# Patient Record
Sex: FEMALE | Race: WHITE | NOT HISPANIC OR LATINO | Employment: OTHER | URBAN - METROPOLITAN AREA
[De-identification: names, ages, dates, MRNs, and addresses within clinical notes are randomized per-mention and may not be internally consistent; named-entity substitution may affect disease eponyms.]

---

## 2017-02-12 ENCOUNTER — OFFICE VISIT (OUTPATIENT)
Dept: RETAIL CLINIC | Facility: CLINIC | Age: 82
End: 2017-02-12

## 2017-02-12 VITALS
HEART RATE: 68 BPM | DIASTOLIC BLOOD PRESSURE: 58 MMHG | OXYGEN SATURATION: 98 % | TEMPERATURE: 98 F | SYSTOLIC BLOOD PRESSURE: 110 MMHG | WEIGHT: 167 LBS

## 2017-02-12 DIAGNOSIS — J45.901 ASTHMATIC BRONCHITIS WITH ACUTE EXACERBATION: Primary | ICD-10-CM

## 2017-02-12 PROCEDURE — 99203 OFFICE O/P NEW LOW 30 MIN: CPT | Performed by: NURSE PRACTITIONER

## 2017-02-12 RX ORDER — LEVOTHYROXINE SODIUM 0.07 MG/1
75 TABLET ORAL DAILY
COMMUNITY

## 2017-02-12 RX ORDER — AZITHROMYCIN 250 MG/1
TABLET, FILM COATED ORAL
Qty: 6 TABLET | Refills: 0 | Status: SHIPPED | OUTPATIENT
Start: 2017-02-12 | End: 2018-07-26

## 2017-02-12 RX ORDER — OMEPRAZOLE 40 MG/1
40 CAPSULE, DELAYED RELEASE ORAL DAILY
COMMUNITY

## 2017-02-12 RX ORDER — PREDNISONE 1 MG/1
1 TABLET ORAL DAILY
COMMUNITY
End: 2018-07-26

## 2017-02-12 RX ORDER — CANDESARTAN CILEXETIL AND HYDROCHLOROTHIAZIDE 32; 12.5 MG/1; MG/1
1 TABLET ORAL DAILY
COMMUNITY
End: 2018-07-26

## 2017-02-12 RX ORDER — AMLODIPINE BESYLATE 5 MG/1
5 TABLET ORAL DAILY
COMMUNITY
End: 2018-07-26

## 2017-02-12 RX ORDER — ALBUTEROL SULFATE 90 UG/1
2 AEROSOL, METERED RESPIRATORY (INHALATION) EVERY 4 HOURS PRN
COMMUNITY
End: 2018-11-04

## 2017-02-12 NOTE — PROGRESS NOTES
Subjective   Anitra Dee is a 81 y.o. female.     History of Present Illness Patient of Dr Myers. One week ago she began to complain of dry cough and fatigue. Has a history of asthma.  Had some left over prednisone and took it for three days but stopped due to insomnia. Has some exp wheezes.  No fever. Denies myalgia. No headache, rhinorrhea or sore throat.  She did have one episode of diarrhea.  Has had pneumonia twice. Has not had flu or pneumo vax.     The following portions of the patient's history were reviewed and updated as appropriate: allergies, current medications, past family history, past medical history, past social history, past surgical history and problem list.    Review of Systems   Constitutional: Positive for fatigue. Negative for fever and unexpected weight change.   HENT: Negative for congestion, hearing loss, nosebleeds, rhinorrhea, sore throat, trouble swallowing and voice change.    Eyes: Negative for pain, discharge, redness and visual disturbance.   Respiratory: Positive for cough. Negative for chest tightness, shortness of breath and wheezing.    Cardiovascular: Negative for chest pain, palpitations and leg swelling.   Gastrointestinal: Negative for abdominal distention, abdominal pain, anal bleeding, blood in stool, constipation, diarrhea, nausea and vomiting.   Endocrine: Negative for cold intolerance, heat intolerance, polydipsia, polyphagia and polyuria.   Genitourinary: Negative for dysuria, flank pain, frequency and hematuria.   Musculoskeletal: Negative for arthralgias, gait problem, joint swelling and myalgias.   Skin: Negative for color change and rash.   Neurological: Negative for dizziness, tremors, seizures, syncope, speech difficulty, weakness, numbness and headaches.   Hematological: Negative.    Psychiatric/Behavioral: Negative.        Objective   Physical Exam   Constitutional: She is oriented to person, place, and time. She appears well-developed and well-nourished.  No distress.   HENT:   Head: Normocephalic and atraumatic.   Right Ear: Tympanic membrane and external ear normal.   Left Ear: Tympanic membrane and external ear normal.   Nose: Nose normal.   Mouth/Throat: Oropharynx is clear and moist. No oropharyngeal exudate.   Eyes: Conjunctivae are normal. Pupils are equal, round, and reactive to light. Right eye exhibits no discharge. Left eye exhibits no discharge. No scleral icterus.   Neck: Neck supple. No tracheal deviation present. No thyromegaly present.   Cardiovascular: Normal rate, regular rhythm and normal heart sounds.  Exam reveals no gallop and no friction rub.    No murmur heard.  Pulmonary/Chest: Effort normal. No respiratory distress. She has wheezes.   Exp wheezes bilat   Abdominal: Soft. Bowel sounds are normal. She exhibits no distension and no mass. There is no tenderness.   Musculoskeletal: She exhibits no edema or deformity.   Lymphadenopathy:     She has no cervical adenopathy.   Neurological: She is alert and oriented to person, place, and time. Coordination normal.   Skin: Skin is warm and dry. No rash noted. No erythema.   Psychiatric: She has a normal mood and affect. Her speech is normal and behavior is normal. Judgment and thought content normal.   Nursing note and vitals reviewed.      Anitra was seen today for cough.    Diagnoses and all orders for this visit:    Asthmatic bronchitis with acute exacerbation  -     azithromycin (ZITHROMAX Z-ADOLPH) 250 MG tablet; Take 2 tablets the first day, then 1 tablet daily for 4 days.      I have instructed her to continue her prednisone and begin to use her albuterol inh 2 puffs qid. Follow up with Dr Myers. Encourage flu and pneumovacc. Increase fluids. Discussed the nature of the disease including, risks, complications, implications, management, safe and proper use of medications. Encouraged therapeutic lifestyle changes including low calorie diet with plenty of fruits and vegetables, daily exercise,  medication compliance, and keeping scheduled follow up appointments. Encouraged patient to have appointment for complete physical, fasting labs, appropriate screenings, and immunizations on an annual basis.

## 2017-02-12 NOTE — PATIENT INSTRUCTIONS
Asthma, Adult  Asthma is a recurring condition in which the airways tighten and narrow. Asthma can make it difficult to breathe. It can cause coughing, wheezing, and shortness of breath. Asthma episodes, also called asthma attacks, range from minor to life-threatening. Asthma cannot be cured, but medicines and lifestyle changes can help control it.  CAUSES  Asthma is believed to be caused by inherited (genetic) and environmental factors, but its exact cause is unknown. Asthma may be triggered by allergens, lung infections, or irritants in the air. Asthma triggers are different for each person. Common triggers include:   · Animal dander.  · Dust mites.  · Cockroaches.  · Pollen from trees or grass.  · Mold.  · Smoke.  · Air pollutants such as dust, household , hair sprays, aerosol sprays, paint fumes, strong chemicals, or strong odors.  · Cold air, weather changes, and winds (which increase molds and pollens in the air).  · Strong emotional expressions such as crying or laughing hard.  · Stress.  · Certain medicines (such as aspirin) or types of drugs (such as beta-blockers).  · Sulfites in foods and drinks. Foods and drinks that may contain sulfites include dried fruit, potato chips, and sparkling grape juice.  · Infections or inflammatory conditions such as the flu, a cold, or an inflammation of the nasal membranes (rhinitis).  · Gastroesophageal reflux disease (GERD).  · Exercise or strenuous activity.  SYMPTOMS  Symptoms may occur immediately after asthma is triggered or many hours later. Symptoms include:  · Wheezing.  · Excessive nighttime or early morning coughing.  · Frequent or severe coughing with a common cold.  · Chest tightness.  · Shortness of breath.  DIAGNOSIS   The diagnosis of asthma is made by a review of your medical history and a physical exam. Tests may also be performed. These may include:  · Lung function studies. These tests show how much air you breathe in and out.  · Allergy  tests.  · Imaging tests such as X-rays.  TREATMENT   Asthma cannot be cured, but it can usually be controlled. Treatment involves identifying and avoiding your asthma triggers. It also involves medicines. There are 2 classes of medicine used for asthma treatment:   · Controller medicines. These prevent asthma symptoms from occurring. They are usually taken every day.  · Reliever or rescue medicines. These quickly relieve asthma symptoms. They are used as needed and provide short-term relief.  Your health care provider will help you create an asthma action plan. An asthma action plan is a written plan for managing and treating your asthma attacks. It includes a list of your asthma triggers and how they may be avoided. It also includes information on when medicines should be taken and when their dosage should be changed. An action plan may also involve the use of a device called a peak flow meter. A peak flow meter measures how well the lungs are working. It helps you monitor your condition.  HOME CARE INSTRUCTIONS   · Take medicines only as directed by your health care provider. Speak with your health care provider if you have questions about how or when to take the medicines.  · Use a peak flow meter as directed by your health care provider. Record and keep track of readings.  · Understand and use the action plan to help minimize or stop an asthma attack without needing to seek medical care.  · Control your home environment in the following ways to help prevent asthma attacks:    Do not smoke. Avoid being exposed to secondhand smoke.    Change your heating and air conditioning filter regularly.    Limit your use of fireplaces and wood stoves.    Get rid of pests (such as roaches and mice) and their droppings.    Throw away plants if you see mold on them.    Clean your floors and dust regularly. Use unscented cleaning products.    Try to have someone else vacuum for you regularly. Stay out of rooms while they are  being vacuumed and for a short while afterward. If you vacuum, use a dust mask from a hardware store, a double-layered or microfilter vacuum  bag, or a vacuum  with a HEPA filter.    Replace carpet with wood, tile, or vinyl phuong. Carpet can trap dander and dust.    Use allergy-proof pillows, mattress covers, and box spring covers.    Wash bed sheets and blankets every week in hot water and dry them in a dryer.    Use blankets that are made of polyester or cotton.    Clean bathrooms and cuauhtemoc with bleach. If possible, have someone repaint the walls in these rooms with mold-resistant paint. Keep out of the rooms that are being cleaned and painted.    Wash hands frequently.  SEEK MEDICAL CARE IF:   · You have wheezing, shortness of breath, or a cough even if taking medicine to prevent attacks.  · The colored mucus you cough up (sputum) is thicker than usual.  · Your sputum changes from clear or white to yellow, green, gray, or bloody.  · You have any problems that may be related to the medicines you are taking (such as a rash, itching, swelling, or trouble breathing).  · You are using a reliever medicine more than 2-3 times per week.  · Your peak flow is still at 50-79% of your personal best after following your action plan for 1 hour.  · You have a fever.  SEEK IMMEDIATE MEDICAL CARE IF:   · You seem to be getting worse and are unresponsive to treatment during an asthma attack.  · You are short of breath even at rest.  · You get short of breath when doing very little physical activity.  · You have difficulty eating, drinking, or talking due to asthma symptoms.  · You develop chest pain.  · You develop a fast heartbeat.  · You have a bluish color to your lips or fingernails.  · You are light-headed, dizzy, or faint.  · Your peak flow is less than 50% of your personal best.     This information is not intended to replace advice given to you by your health care provider. Make sure you discuss any  questions you have with your health care provider.     Document Released: 12/18/2006 Document Revised: 09/07/2016 Document Reviewed: 07/17/2014  [employer] Interactive Patient Education ©2016 [employer]  How to Use an Inhaler  Using your inhaler correctly is very important. Good technique will make sure that the medicine reaches your lungs.   HOW TO USE AN INHALER:  1. Take the cap off the inhaler.  2. If this is the first time using your inhaler, you need to prime it. Shake the inhaler for 5 seconds. Release four puffs into the air, away from your face. Ask your doctor for help if you have questions.  3. Shake the inhaler for 5 seconds.  4. Turn the inhaler so the bottle is above the mouthpiece.  5. Put your pointer finger on top of the bottle. Your thumb holds the bottom of the inhaler.  6. Open your mouth.  7. Either hold the inhaler away from your mouth (the width of 2 fingers) or place your lips tightly around the mouthpiece. Ask your doctor which way to use your inhaler.  8. Breathe out as much air as possible.  9. Breathe in and push down on the bottle 1 time to release the medicine. You will feel the medicine go in your mouth and throat.  10. Continue to take a deep breath in very slowly. Try to fill your lungs.  11. After you have breathed in completely, hold your breath for 10 seconds. This will help the medicine to settle in your lungs. If you cannot hold your breath for 10 seconds, hold it for as long as you can before you breathe out.  12. Breathe out slowly, through pursed lips. Whistling is an example of pursed lips.  13. If your doctor has told you to take more than 1 puff, wait at least 15-30 seconds between puffs. This will help you get the best results from your medicine. Do not use the inhaler more than your doctor tells you to.  14. Put the cap back on the inhaler.  15. Follow the directions from your doctor or from the inhaler package about cleaning the inhaler.  If you use more than one  inhaler, ask your doctor which inhalers to use and what order to use them in. Ask your doctor to help you figure out when you will need to refill your inhaler.   If you use a steroid inhaler, always rinse your mouth with water after your last puff, gargle and spit out the water. Do not swallow the water.  GET HELP IF:  · The inhaler medicine only partially helps to stop wheezing or shortness of breath.  · You are having trouble using your inhaler.  · You have some increase in thick spit (phlegm).  GET HELP RIGHT AWAY IF:  · The inhaler medicine does not help your wheezing or shortness of breath or you have tightness in your chest.  · You have dizziness, headaches, or fast heart rate.  · You have chills, fever, or night sweats.  · You have a large increase of thick spit, or your thick spit is bloody.  MAKE SURE YOU:   · Understand these instructions.  · Will watch your condition.  · Will get help right away if you are not doing well or get worse.     This information is not intended to replace advice given to you by your health care provider. Make sure you discuss any questions you have with your health care provider.     Document Released: 09/26/2009 Document Revised: 10/08/2014 Document Reviewed: 07/17/2014  [employer] Interactive Patient Education ©2016 [employer]

## 2018-07-26 ENCOUNTER — HOSPITAL ENCOUNTER (EMERGENCY)
Facility: HOSPITAL | Age: 83
Discharge: HOME | End: 2018-07-26
Attending: EMERGENCY MEDICINE | Admitting: EMERGENCY MEDICINE

## 2018-07-26 VITALS
BODY MASS INDEX: 33.26 KG/M2 | TEMPERATURE: 97.7 F | WEIGHT: 165 LBS | RESPIRATION RATE: 18 BRPM | OXYGEN SATURATION: 95 % | HEART RATE: 69 BPM | DIASTOLIC BLOOD PRESSURE: 68 MMHG | HEIGHT: 59 IN | SYSTOLIC BLOOD PRESSURE: 167 MMHG

## 2018-07-26 DIAGNOSIS — I10 HYPERTENSION, UNCONTROLLED: Primary | ICD-10-CM

## 2018-07-26 DIAGNOSIS — N30.00 ACUTE CYSTITIS WITHOUT HEMATURIA: ICD-10-CM

## 2018-07-26 LAB
ALBUMIN SERPL-MCNC: 3.98 G/DL (ref 3.2–4.8)
ALBUMIN/GLOB SERPL: 1.3 G/DL (ref 1.5–2.5)
ALP SERPL-CCNC: 46 U/L (ref 25–100)
ALT SERPL W P-5'-P-CCNC: 11 U/L (ref 7–40)
ANION GAP SERPL CALCULATED.3IONS-SCNC: 10 MMOL/L (ref 3–11)
AST SERPL-CCNC: 24 U/L (ref 0–33)
BACTERIA UR QL AUTO: ABNORMAL /HPF
BASOPHILS # BLD AUTO: 0.05 10*3/MM3 (ref 0–0.2)
BASOPHILS NFR BLD AUTO: 0.8 % (ref 0–1)
BILIRUB SERPL-MCNC: 0.2 MG/DL (ref 0.3–1.2)
BILIRUB UR QL STRIP: NEGATIVE
BNP SERPL-MCNC: 82 PG/ML (ref 0–100)
BUN BLD-MCNC: 15 MG/DL (ref 9–23)
BUN/CREAT SERPL: 14.3 (ref 7–25)
CALCIUM SPEC-SCNC: 9.4 MG/DL (ref 8.7–10.4)
CHLORIDE SERPL-SCNC: 107 MMOL/L (ref 99–109)
CLARITY UR: CLEAR
CO2 SERPL-SCNC: 24 MMOL/L (ref 20–31)
COLOR UR: YELLOW
CREAT BLD-MCNC: 1.05 MG/DL (ref 0.6–1.3)
DEPRECATED RDW RBC AUTO: 45.2 FL (ref 37–54)
EOSINOPHIL # BLD AUTO: 0.09 10*3/MM3 (ref 0–0.3)
EOSINOPHIL NFR BLD AUTO: 1.4 % (ref 0–3)
ERYTHROCYTE [DISTWIDTH] IN BLOOD BY AUTOMATED COUNT: 15 % (ref 11.3–14.5)
GFR SERPL CREATININE-BSD FRML MDRD: 50 ML/MIN/1.73
GLOBULIN UR ELPH-MCNC: 3 GM/DL
GLUCOSE BLD-MCNC: 112 MG/DL (ref 70–100)
GLUCOSE UR STRIP-MCNC: NEGATIVE MG/DL
HCT VFR BLD AUTO: 38.2 % (ref 34.5–44)
HGB BLD-MCNC: 12.2 G/DL (ref 11.5–15.5)
HGB UR QL STRIP.AUTO: NEGATIVE
HOLD SPECIMEN: NORMAL
HOLD SPECIMEN: NORMAL
HYALINE CASTS UR QL AUTO: ABNORMAL /LPF
IMM GRANULOCYTES # BLD: 0.01 10*3/MM3 (ref 0–0.03)
IMM GRANULOCYTES NFR BLD: 0.2 % (ref 0–0.6)
KETONES UR QL STRIP: NEGATIVE
LEUKOCYTE ESTERASE UR QL STRIP.AUTO: ABNORMAL
LYMPHOCYTES # BLD AUTO: 1.99 10*3/MM3 (ref 0.6–4.8)
LYMPHOCYTES NFR BLD AUTO: 29.9 % (ref 24–44)
MAGNESIUM SERPL-MCNC: 2 MG/DL (ref 1.3–2.7)
MCH RBC QN AUTO: 26.5 PG (ref 27–31)
MCHC RBC AUTO-ENTMCNC: 31.9 G/DL (ref 32–36)
MCV RBC AUTO: 83 FL (ref 80–99)
MONOCYTES # BLD AUTO: 0.6 10*3/MM3 (ref 0–1)
MONOCYTES NFR BLD AUTO: 9 % (ref 0–12)
NEUTROPHILS # BLD AUTO: 3.93 10*3/MM3 (ref 1.5–8.3)
NEUTROPHILS NFR BLD AUTO: 58.9 % (ref 41–71)
NITRITE UR QL STRIP: NEGATIVE
PH UR STRIP.AUTO: <=5 [PH] (ref 5–8)
PLATELET # BLD AUTO: 244 10*3/MM3 (ref 150–450)
PMV BLD AUTO: 10.4 FL (ref 6–12)
POTASSIUM BLD-SCNC: 4.3 MMOL/L (ref 3.5–5.5)
PROT SERPL-MCNC: 7 G/DL (ref 5.7–8.2)
PROT UR QL STRIP: NEGATIVE
RBC # BLD AUTO: 4.6 10*6/MM3 (ref 3.89–5.14)
RBC # UR: ABNORMAL /HPF
REF LAB TEST METHOD: ABNORMAL
SODIUM BLD-SCNC: 141 MMOL/L (ref 132–146)
SP GR UR STRIP: 1.02 (ref 1–1.03)
SQUAMOUS #/AREA URNS HPF: ABNORMAL /HPF
T4 FREE SERPL-MCNC: 1.15 NG/DL (ref 0.89–1.76)
TROPONIN I SERPL-MCNC: <0.006 NG/ML
TSH SERPL DL<=0.05 MIU/L-ACNC: 3.33 MIU/ML (ref 0.35–5.35)
UROBILINOGEN UR QL STRIP: ABNORMAL
WBC NRBC COR # BLD: 6.66 10*3/MM3 (ref 3.5–10.8)
WBC UR QL AUTO: ABNORMAL /HPF
WHOLE BLOOD HOLD SPECIMEN: NORMAL
WHOLE BLOOD HOLD SPECIMEN: NORMAL

## 2018-07-26 PROCEDURE — 87077 CULTURE AEROBIC IDENTIFY: CPT | Performed by: EMERGENCY MEDICINE

## 2018-07-26 PROCEDURE — 87086 URINE CULTURE/COLONY COUNT: CPT | Performed by: EMERGENCY MEDICINE

## 2018-07-26 PROCEDURE — 93005 ELECTROCARDIOGRAM TRACING: CPT | Performed by: EMERGENCY MEDICINE

## 2018-07-26 PROCEDURE — 83880 ASSAY OF NATRIURETIC PEPTIDE: CPT | Performed by: EMERGENCY MEDICINE

## 2018-07-26 PROCEDURE — 84443 ASSAY THYROID STIM HORMONE: CPT | Performed by: EMERGENCY MEDICINE

## 2018-07-26 PROCEDURE — 84439 ASSAY OF FREE THYROXINE: CPT | Performed by: EMERGENCY MEDICINE

## 2018-07-26 PROCEDURE — 99284 EMERGENCY DEPT VISIT MOD MDM: CPT

## 2018-07-26 PROCEDURE — 87186 SC STD MICRODIL/AGAR DIL: CPT | Performed by: EMERGENCY MEDICINE

## 2018-07-26 PROCEDURE — 85025 COMPLETE CBC W/AUTO DIFF WBC: CPT | Performed by: EMERGENCY MEDICINE

## 2018-07-26 PROCEDURE — 81001 URINALYSIS AUTO W/SCOPE: CPT | Performed by: EMERGENCY MEDICINE

## 2018-07-26 PROCEDURE — 96374 THER/PROPH/DIAG INJ IV PUSH: CPT

## 2018-07-26 PROCEDURE — 84484 ASSAY OF TROPONIN QUANT: CPT | Performed by: EMERGENCY MEDICINE

## 2018-07-26 PROCEDURE — 80053 COMPREHEN METABOLIC PANEL: CPT | Performed by: EMERGENCY MEDICINE

## 2018-07-26 PROCEDURE — 83735 ASSAY OF MAGNESIUM: CPT | Performed by: EMERGENCY MEDICINE

## 2018-07-26 RX ORDER — VALSARTAN 80 MG/1
80 TABLET ORAL DAILY
COMMUNITY
End: 2021-02-04

## 2018-07-26 RX ORDER — CEFDINIR 300 MG/1
300 CAPSULE ORAL 2 TIMES DAILY
Qty: 20 CAPSULE | Refills: 0 | Status: SHIPPED | OUTPATIENT
Start: 2018-07-26 | End: 2018-11-04

## 2018-07-26 RX ORDER — LABETALOL 100 MG/1
100 TABLET, FILM COATED ORAL EVERY 12 HOURS SCHEDULED
Status: DISCONTINUED | OUTPATIENT
Start: 2018-07-26 | End: 2018-07-26 | Stop reason: HOSPADM

## 2018-07-26 RX ORDER — LABETALOL HYDROCHLORIDE 5 MG/ML
20 INJECTION, SOLUTION INTRAVENOUS ONCE
Status: COMPLETED | OUTPATIENT
Start: 2018-07-26 | End: 2018-07-26

## 2018-07-26 RX ORDER — SODIUM CHLORIDE 0.9 % (FLUSH) 0.9 %
10 SYRINGE (ML) INJECTION AS NEEDED
Status: DISCONTINUED | OUTPATIENT
Start: 2018-07-26 | End: 2018-07-26 | Stop reason: HOSPADM

## 2018-07-26 RX ORDER — LABETALOL 100 MG/1
100 TABLET, FILM COATED ORAL 2 TIMES DAILY
Qty: 30 TABLET | Refills: 0 | Status: SHIPPED | OUTPATIENT
Start: 2018-07-26

## 2018-07-26 RX ORDER — CEFDINIR 300 MG/1
300 CAPSULE ORAL ONCE
Status: COMPLETED | OUTPATIENT
Start: 2018-07-26 | End: 2018-07-26

## 2018-07-26 RX ADMIN — LABETALOL HYDROCHLORIDE 20 MG: 5 INJECTION INTRAVENOUS at 20:21

## 2018-07-26 RX ADMIN — CEFDINIR 300 MG: 300 CAPSULE ORAL at 21:51

## 2018-07-26 RX ADMIN — LABETALOL HYDROCHLORIDE 100 MG: 100 TABLET, FILM COATED ORAL at 21:30

## 2018-07-28 LAB — BACTERIA SPEC AEROBE CULT: ABNORMAL

## 2018-09-20 ENCOUNTER — TRANSCRIBE ORDERS (OUTPATIENT)
Dept: LAB | Facility: HOSPITAL | Age: 83
End: 2018-09-20

## 2018-09-20 ENCOUNTER — LAB (OUTPATIENT)
Dept: LAB | Facility: HOSPITAL | Age: 83
End: 2018-09-20

## 2018-09-20 DIAGNOSIS — K57.32 DIVERTICULITIS OF LARGE INTESTINE, UNSPECIFIED BLEEDING STATUS, UNSPECIFIED COMPLICATION STATUS: ICD-10-CM

## 2018-09-20 DIAGNOSIS — B96.89 ACUTE BACTERIAL EPIGLOTTITIS: ICD-10-CM

## 2018-09-20 DIAGNOSIS — J05.10 ACUTE BACTERIAL EPIGLOTTITIS: ICD-10-CM

## 2018-09-20 DIAGNOSIS — N39.0 URINARY TRACT INFECTION WITHOUT HEMATURIA, SITE UNSPECIFIED: ICD-10-CM

## 2018-09-20 DIAGNOSIS — N32.9 DISEASE OF BLADDER: Primary | ICD-10-CM

## 2018-09-20 DIAGNOSIS — N32.9 DISEASE OF BLADDER: ICD-10-CM

## 2018-09-20 DIAGNOSIS — E66.09 EXOGENOUS OBESITY: ICD-10-CM

## 2018-09-20 LAB
BACTERIA UR QL AUTO: ABNORMAL /HPF
BILIRUB UR QL STRIP: NEGATIVE
CLARITY UR: CLEAR
COLOR UR: YELLOW
GLUCOSE UR STRIP-MCNC: NEGATIVE MG/DL
HGB UR QL STRIP.AUTO: NEGATIVE
HYALINE CASTS UR QL AUTO: ABNORMAL /LPF
KETONES UR QL STRIP: NEGATIVE
LEUKOCYTE ESTERASE UR QL STRIP.AUTO: ABNORMAL
NITRITE UR QL STRIP: NEGATIVE
PH UR STRIP.AUTO: 5.5 [PH] (ref 5–8)
PROT UR QL STRIP: NEGATIVE
RBC # UR: ABNORMAL /HPF
REF LAB TEST METHOD: ABNORMAL
SP GR UR STRIP: 1.01 (ref 1–1.03)
SQUAMOUS #/AREA URNS HPF: ABNORMAL /HPF
UROBILINOGEN UR QL STRIP: ABNORMAL
WBC UR QL AUTO: ABNORMAL /HPF

## 2018-09-20 PROCEDURE — 81001 URINALYSIS AUTO W/SCOPE: CPT

## 2018-09-20 PROCEDURE — 87086 URINE CULTURE/COLONY COUNT: CPT

## 2018-09-22 LAB — BACTERIA SPEC AEROBE CULT: NORMAL

## 2018-11-04 ENCOUNTER — HOSPITAL ENCOUNTER (EMERGENCY)
Facility: HOSPITAL | Age: 83
Discharge: HOME | End: 2018-11-05
Attending: EMERGENCY MEDICINE | Admitting: EMERGENCY MEDICINE

## 2018-11-04 DIAGNOSIS — I16.0 HYPERTENSIVE URGENCY: Primary | ICD-10-CM

## 2018-11-04 LAB
ALBUMIN SERPL-MCNC: 4.15 G/DL (ref 3.2–4.8)
ALBUMIN/GLOB SERPL: 1.8 G/DL (ref 1.5–2.5)
ALP SERPL-CCNC: 50 U/L (ref 25–100)
ALT SERPL W P-5'-P-CCNC: 12 U/L (ref 7–40)
ANION GAP SERPL CALCULATED.3IONS-SCNC: 6 MMOL/L (ref 3–11)
AST SERPL-CCNC: 18 U/L (ref 0–33)
BASOPHILS # BLD AUTO: 0.03 10*3/MM3 (ref 0–0.2)
BASOPHILS NFR BLD AUTO: 0.5 % (ref 0–1)
BILIRUB SERPL-MCNC: 0.3 MG/DL (ref 0.3–1.2)
BILIRUB UR QL STRIP: NEGATIVE
BUN BLD-MCNC: 17 MG/DL (ref 9–23)
BUN/CREAT SERPL: 18.1 (ref 7–25)
CALCIUM SPEC-SCNC: 9.5 MG/DL (ref 8.7–10.4)
CHLORIDE SERPL-SCNC: 105 MMOL/L (ref 99–109)
CLARITY UR: CLEAR
CO2 SERPL-SCNC: 29 MMOL/L (ref 20–31)
COLOR UR: YELLOW
CREAT BLD-MCNC: 0.94 MG/DL (ref 0.6–1.3)
DEPRECATED RDW RBC AUTO: 46.6 FL (ref 37–54)
EOSINOPHIL # BLD AUTO: 0.11 10*3/MM3 (ref 0–0.3)
EOSINOPHIL NFR BLD AUTO: 1.8 % (ref 0–3)
ERYTHROCYTE [DISTWIDTH] IN BLOOD BY AUTOMATED COUNT: 15.5 % (ref 11.3–14.5)
GFR SERPL CREATININE-BSD FRML MDRD: 57 ML/MIN/1.73
GLOBULIN UR ELPH-MCNC: 2.4 GM/DL
GLUCOSE BLD-MCNC: 105 MG/DL (ref 70–100)
GLUCOSE UR STRIP-MCNC: NEGATIVE MG/DL
HCT VFR BLD AUTO: 35.4 % (ref 34.5–44)
HGB BLD-MCNC: 11.4 G/DL (ref 11.5–15.5)
HGB UR QL STRIP.AUTO: NEGATIVE
IMM GRANULOCYTES # BLD: 0.01 10*3/MM3 (ref 0–0.03)
IMM GRANULOCYTES NFR BLD: 0.2 % (ref 0–0.6)
KETONES UR QL STRIP: NEGATIVE
LEUKOCYTE ESTERASE UR QL STRIP.AUTO: NEGATIVE
LYMPHOCYTES # BLD AUTO: 2.04 10*3/MM3 (ref 0.6–4.8)
LYMPHOCYTES NFR BLD AUTO: 33.6 % (ref 24–44)
MCH RBC QN AUTO: 26.3 PG (ref 27–31)
MCHC RBC AUTO-ENTMCNC: 32.2 G/DL (ref 32–36)
MCV RBC AUTO: 81.6 FL (ref 80–99)
MONOCYTES # BLD AUTO: 0.61 10*3/MM3 (ref 0–1)
MONOCYTES NFR BLD AUTO: 10 % (ref 0–12)
NEUTROPHILS # BLD AUTO: 3.28 10*3/MM3 (ref 1.5–8.3)
NEUTROPHILS NFR BLD AUTO: 54.1 % (ref 41–71)
NITRITE UR QL STRIP: NEGATIVE
PH UR STRIP.AUTO: 6.5 [PH] (ref 5–8)
PLATELET # BLD AUTO: 221 10*3/MM3 (ref 150–450)
PMV BLD AUTO: 9.4 FL (ref 6–12)
POTASSIUM BLD-SCNC: 3.8 MMOL/L (ref 3.5–5.5)
PROT SERPL-MCNC: 6.5 G/DL (ref 5.7–8.2)
PROT UR QL STRIP: NEGATIVE
RBC # BLD AUTO: 4.34 10*6/MM3 (ref 3.89–5.14)
SODIUM BLD-SCNC: 140 MMOL/L (ref 132–146)
SP GR UR STRIP: 1.01 (ref 1–1.03)
TROPONIN I SERPL-MCNC: 0.01 NG/ML (ref 0–0.07)
UROBILINOGEN UR QL STRIP: NORMAL
WBC NRBC COR # BLD: 6.07 10*3/MM3 (ref 3.5–10.8)

## 2018-11-04 PROCEDURE — 25010000002 LORAZEPAM PER 2 MG: Performed by: EMERGENCY MEDICINE

## 2018-11-04 PROCEDURE — 99284 EMERGENCY DEPT VISIT MOD MDM: CPT

## 2018-11-04 PROCEDURE — 96375 TX/PRO/DX INJ NEW DRUG ADDON: CPT

## 2018-11-04 PROCEDURE — 96376 TX/PRO/DX INJ SAME DRUG ADON: CPT

## 2018-11-04 PROCEDURE — 81003 URINALYSIS AUTO W/O SCOPE: CPT | Performed by: EMERGENCY MEDICINE

## 2018-11-04 PROCEDURE — 96374 THER/PROPH/DIAG INJ IV PUSH: CPT

## 2018-11-04 PROCEDURE — P9612 CATHETERIZE FOR URINE SPEC: HCPCS

## 2018-11-04 PROCEDURE — 80053 COMPREHEN METABOLIC PANEL: CPT | Performed by: EMERGENCY MEDICINE

## 2018-11-04 PROCEDURE — 84484 ASSAY OF TROPONIN QUANT: CPT

## 2018-11-04 PROCEDURE — 85025 COMPLETE CBC W/AUTO DIFF WBC: CPT | Performed by: EMERGENCY MEDICINE

## 2018-11-04 PROCEDURE — 93005 ELECTROCARDIOGRAM TRACING: CPT | Performed by: EMERGENCY MEDICINE

## 2018-11-04 RX ORDER — LORAZEPAM 2 MG/ML
0.5 INJECTION INTRAMUSCULAR ONCE
Status: COMPLETED | OUTPATIENT
Start: 2018-11-04 | End: 2018-11-04

## 2018-11-04 RX ORDER — LABETALOL HYDROCHLORIDE 5 MG/ML
10 INJECTION, SOLUTION INTRAVENOUS ONCE
Status: COMPLETED | OUTPATIENT
Start: 2018-11-04 | End: 2018-11-04

## 2018-11-04 RX ADMIN — LORAZEPAM 0.5 MG: 2 INJECTION INTRAMUSCULAR; INTRAVENOUS at 22:12

## 2018-11-04 RX ADMIN — LABETALOL HYDROCHLORIDE 10 MG: 5 INJECTION INTRAVENOUS at 23:28

## 2018-11-04 RX ADMIN — LORAZEPAM 0.5 MG: 2 INJECTION INTRAMUSCULAR; INTRAVENOUS at 23:27

## 2018-11-05 VITALS
TEMPERATURE: 97.4 F | DIASTOLIC BLOOD PRESSURE: 67 MMHG | SYSTOLIC BLOOD PRESSURE: 171 MMHG | RESPIRATION RATE: 16 BRPM | HEIGHT: 59 IN | HEART RATE: 63 BPM | WEIGHT: 165 LBS | BODY MASS INDEX: 33.26 KG/M2 | OXYGEN SATURATION: 92 %

## 2018-11-05 RX ORDER — CLONIDINE HYDROCHLORIDE 0.1 MG/1
0.1 TABLET ORAL 2 TIMES DAILY PRN
Qty: 16 TABLET | Refills: 0 | Status: SHIPPED | OUTPATIENT
Start: 2018-11-05 | End: 2019-08-11 | Stop reason: HOSPADM

## 2018-11-05 RX ORDER — CLONIDINE HYDROCHLORIDE 0.1 MG/1
0.1 TABLET ORAL ONCE
Status: COMPLETED | OUTPATIENT
Start: 2018-11-05 | End: 2018-11-05

## 2018-11-05 RX ADMIN — CLONIDINE HYDROCHLORIDE 0.1 MG: 0.1 TABLET ORAL at 00:56

## 2018-11-05 NOTE — ED PROVIDER NOTES
"Subjective   Ms. Anitra Dee is an 83 year old female who presents to the ED with c/o hypertension. Patient reports that she has a chronic hx of hypertension. She states she checks her blood pressure regularly and takes her antihypertensives (100 mg labetalol bid and 80 mg valsartan qd) as prescribed but laments that her blood pressure seems to be chronically elevated. Notes that over the past three weeks her blood pressure has never dropped below 170 systolic. Tonight, while serially checking her blood pressure, she recorded a pressure of greater than 200 systolic. Despite taking a second dose of her valsartan this evening, her pressure has remained high. Decided to come to the ED for evaluation of this as she is \"scared to sleep with such a dangerous pressure.\" In the emergency room, patient denies any acute symptoms. Denies any chest pain or shortness of breath. Does note a very mild head \"pressure\" but no pain or aches. No weakness or loss of conscious.     Other past medical history includes obesity, hypothyroidism, gastroesophageal reflux disease, and chronic UTI (states she has had this evaluated by her primary care provider and an infectious disease specialist without definitive results). Former smoker.        History provided by:  Patient  Hypertension   Severity:  Severe  Timing:  Constant  Progression:  Unchanged  Chronicity: acute on chronic.  Context: not noncompliance    Relieved by:  Nothing  Worsened by:  Nothing  Ineffective treatments:  Beta blockers and angiotensin blockers  Associated symptoms: no abdominal pain, no blurred vision, no chest pain, no confusion, no ear pain, no fever, no headaches, no hematuria, no loss of consciousness, no nausea, no palpitations, no shortness of breath, no syncope, not vomiting and no weakness    Risk factors: obesity  Tobacco use: former smoker.        Review of Systems   Constitutional: Negative for fever.   HENT: Negative for ear pain.    Eyes: Negative " for blurred vision.   Respiratory: Negative for shortness of breath.    Cardiovascular: Negative for chest pain, palpitations and syncope.        Hypertension   Gastrointestinal: Negative for abdominal pain, nausea and vomiting.   Genitourinary: Negative for hematuria.   Neurological: Negative for loss of consciousness, weakness and headaches.   Psychiatric/Behavioral: Negative for confusion.   All other systems reviewed and are negative.      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 She appears well-developed and well-nourished. No distress.   HENT:   Head: Normocephalic and atraumatic.   Eyes: Conjunctivae are normal. No scleral icterus.   Neck: Normal range of motion. Neck supple.   Cardiovascular: Normal rate, regular rhythm, normal heart sounds and intact distal pulses.  Exam reveals no gallop and no friction rub.    No murmur heard.  Pulmonary/Chest: Effort normal and breath sounds normal. No respiratory distress. She has no wheezes. She has no rales.   Abdominal: Soft. Bowel sounds are normal. There is no tenderness. There is no guarding.   Musculoskeletal:  Normal range of motion.   Neurological: She is alert and oriented to person, place, and time.   Skin: Skin is warm and dry. She is not diaphoretic.   Psychiatric: She has a normal mood and affect. Her behavior is normal.   Nursing note and vitals reviewed.      Procedures         ED Course       Recent Results (from the past 24 hour(s))   Urinalysis With Microscopic If Indicated (No Culture) - Urine, Catheter    Collection Time: 11/04/18 10:06 PM   Result Value Ref Range    Color, UA Yellow Yellow, Straw    Appearance, UA Clear Clear    pH, UA 6.5 5.0 - 8.0    Specific Gravity, UA 1.010 1.001 - 1.030    Glucose, UA Negative Negative    Ketones, UA Negative Negative    Bilirubin, UA Negative Negative    Blood, UA Negative Negative    Protein, UA Negative Negative    Leuk Esterase, UA Negative Negative    Nitrite, UA Negative Negative    Urobilinogen, UA 0.2 E.U./dL 0.2 - 1.0 E.U./dL   Comprehensive Metabolic Panel    Collection Time: 11/04/18 10:06 PM   Result Value Ref Range    Glucose 105 (H) 70 - 100 mg/dL    BUN 17 9 - 23 mg/dL    Creatinine 0.94 0.60 - 1.30 mg/dL    Sodium 140 132 - 146 mmol/L    Potassium 3.8 3.5 - 5.5 mmol/L    Chloride 105 99 - 109 mmol/L    CO2 29.0 20.0 - 31.0 mmol/L    Calcium 9.5 8.7 - 10.4 mg/dL    Total Protein 6.5 5.7 - 8.2 g/dL    Albumin 4.15 3.20 - 4.80 g/dL    ALT (SGPT) 12 7 - 40 U/L    AST (SGOT) 18 0 - 33 U/L    Alkaline Phosphatase 50 25 - 100 U/L    Total Bilirubin 0.3 0.3 - 1.2 mg/dL    eGFR Non African Amer 57 (L) >60 mL/min/1.73    Globulin 2.4 gm/dL    A/G Ratio 1.8 1.5 - 2.5 g/dL    BUN/Creatinine Ratio 18.1 7.0 - 25.0    Anion Gap 6.0 3.0 - 11.0 mmol/L   CBC Auto Differential    Collection Time: 11/04/18 10:06 PM   Result Value Ref Range    WBC 6.07 3.50 - 10.80 10*3/mm3    RBC 4.34 3.89 - 5.14 10*6/mm3    Hemoglobin 11.4 (L) 11.5 - 15.5 g/dL    Hematocrit 35.4 34.5 - 44.0 %    MCV 81.6 80.0 - 99.0 fL    MCH 26.3 (L) 27.0 - 31.0 pg    MCHC 32.2 32.0 - 36.0 g/dL    RDW  15.5 (H) 11.3 - 14.5 %    RDW-SD 46.6 37.0 - 54.0 fl    MPV 9.4 6.0 - 12.0 fL    Platelets 221 150 - 450 10*3/mm3    Neutrophil % 54.1 41.0 - 71.0 %    Lymphocyte % 33.6 24.0 - 44.0 %    Monocyte % 10.0 0.0 - 12.0 %    Eosinophil % 1.8 0.0 - 3.0 %    Basophil % 0.5 0.0 - 1.0 %    Immature Grans % 0.2 0.0 - 0.6 %    Neutrophils, Absolute 3.28 1.50 - 8.30 10*3/mm3    Lymphocytes, Absolute 2.04 0.60 - 4.80 10*3/mm3    Monocytes, Absolute 0.61 0.00 - 1.00 10*3/mm3    Eosinophils, Absolute 0.11 0.00 - 0.30 10*3/mm3    Basophils, Absolute 0.03 0.00 - 0.20 10*3/mm3    Immature Grans, Absolute 0.01 0.00 - 0.03 10*3/mm3   POC Troponin, Rapid    Collection Time: 11/04/18 10:13 PM   Result Value Ref Range    Troponin I 0.01 0.00 - 0.07 ng/mL     Note: In addition to lab results from this visit, the labs listed above may include labs taken at another facility or during a different encounter within the last 24 hours. Please correlate lab times with ED admission and discharge times for further clarification of the services performed during this visit.    No orders to display     Vitals:    11/04/18 2217 11/04/18 2228 11/04/18 2229 11/04/18 2252   BP:       BP Location:       Patient Position:       Pulse: 65 64 64 66   Resp:       Temp:       TempSrc:       SpO2: 94% 95% 94% 94%   Weight:       Height:         Medications   LORazepam (ATIVAN) injection 0.5 mg (not administered)   labetalol (NORMODYNE,TRANDATE) injection 10 mg (not administered)   LORazepam (ATIVAN) injection 0.5 mg (0.5 mg Intravenous Given 11/4/18 2212)     ECG/EMG Results (last 24 hours)     ** No results found for the last 24 hours. **                      [facility]    Final diagnoses:   Hypertensive urgency       Documentation assistance provided by lucia Salcedo.  Information recorded by the scribe was done at my direction and has been verified and validated by me.     Jeremy Salcedo  11/04/18 5011       Jeremy Salcedo  11/04/18 2649       Prasad Ramos,  DO  11/05/18 0411

## 2019-08-09 ENCOUNTER — APPOINTMENT (OUTPATIENT)
Dept: CT IMAGING | Facility: HOSPITAL | Age: 84
End: 2019-08-09

## 2019-08-09 ENCOUNTER — HOSPITAL ENCOUNTER (OUTPATIENT)
Facility: HOSPITAL | Age: 84
Setting detail: OBSERVATION
Discharge: HOME | End: 2019-08-11
Attending: EMERGENCY MEDICINE | Admitting: INTERNAL MEDICINE

## 2019-08-09 DIAGNOSIS — Z74.09 IMPAIRED FUNCTIONAL MOBILITY, BALANCE, GAIT, AND ENDURANCE: ICD-10-CM

## 2019-08-09 DIAGNOSIS — T67.1XXA HEAT SYNCOPE, INITIAL ENCOUNTER: ICD-10-CM

## 2019-08-09 DIAGNOSIS — Z78.9 IMPAIRED MOBILITY AND ADLS: ICD-10-CM

## 2019-08-09 DIAGNOSIS — S09.90XA MINOR HEAD INJURY, INITIAL ENCOUNTER: ICD-10-CM

## 2019-08-09 DIAGNOSIS — R41.0 CONFUSION: ICD-10-CM

## 2019-08-09 DIAGNOSIS — I16.1 HYPERTENSIVE EMERGENCY: Primary | ICD-10-CM

## 2019-08-09 DIAGNOSIS — S00.81XA ABRASION OF FACE, INITIAL ENCOUNTER: ICD-10-CM

## 2019-08-09 DIAGNOSIS — Z74.09 IMPAIRED MOBILITY AND ADLS: ICD-10-CM

## 2019-08-09 LAB
ALBUMIN SERPL-MCNC: 3.8 G/DL (ref 3.5–5.2)
ALBUMIN/GLOB SERPL: 1.4 G/DL
ALP SERPL-CCNC: 50 U/L (ref 39–117)
ALT SERPL W P-5'-P-CCNC: 10 U/L (ref 1–33)
ANION GAP SERPL CALCULATED.3IONS-SCNC: 13 MMOL/L (ref 5–15)
AST SERPL-CCNC: 23 U/L (ref 1–32)
BASOPHILS # BLD AUTO: 0.04 10*3/MM3 (ref 0–0.2)
BASOPHILS NFR BLD AUTO: 0.6 % (ref 0–1.5)
BILIRUB SERPL-MCNC: <0.2 MG/DL (ref 0.2–1.2)
BUN BLD-MCNC: 21 MG/DL (ref 8–23)
BUN/CREAT SERPL: 23.1 (ref 7–25)
CALCIUM SPEC-SCNC: 9.6 MG/DL (ref 8.6–10.5)
CHLORIDE SERPL-SCNC: 99 MMOL/L (ref 98–107)
CO2 SERPL-SCNC: 25 MMOL/L (ref 22–29)
CREAT BLD-MCNC: 0.91 MG/DL (ref 0.57–1)
DEPRECATED RDW RBC AUTO: 52.5 FL (ref 37–54)
EOSINOPHIL # BLD AUTO: 0.1 10*3/MM3 (ref 0–0.4)
EOSINOPHIL NFR BLD AUTO: 1.5 % (ref 0.3–6.2)
ERYTHROCYTE [DISTWIDTH] IN BLOOD BY AUTOMATED COUNT: 18.2 % (ref 12.3–15.4)
GFR SERPL CREATININE-BSD FRML MDRD: 59 ML/MIN/1.73
GLOBULIN UR ELPH-MCNC: 2.8 GM/DL
GLUCOSE BLD-MCNC: 107 MG/DL (ref 65–99)
HCT VFR BLD AUTO: 35.4 % (ref 34–46.6)
HGB BLD-MCNC: 11.1 G/DL (ref 12–15.9)
IMM GRANULOCYTES # BLD AUTO: 0.02 10*3/MM3 (ref 0–0.05)
IMM GRANULOCYTES NFR BLD AUTO: 0.3 % (ref 0–0.5)
INR PPP: 1.07 (ref 0.85–1.16)
LYMPHOCYTES # BLD AUTO: 1.56 10*3/MM3 (ref 0.7–3.1)
LYMPHOCYTES NFR BLD AUTO: 24.1 % (ref 19.6–45.3)
MAGNESIUM SERPL-MCNC: 2.2 MG/DL (ref 1.6–2.4)
MCH RBC QN AUTO: 24.8 PG (ref 26.6–33)
MCHC RBC AUTO-ENTMCNC: 31.4 G/DL (ref 31.5–35.7)
MCV RBC AUTO: 79 FL (ref 79–97)
MONOCYTES # BLD AUTO: 0.55 10*3/MM3 (ref 0.1–0.9)
MONOCYTES NFR BLD AUTO: 8.5 % (ref 5–12)
NEUTROPHILS # BLD AUTO: 4.2 10*3/MM3 (ref 1.7–7)
NEUTROPHILS NFR BLD AUTO: 65 % (ref 42.7–76)
NRBC BLD AUTO-RTO: 0 /100 WBC (ref 0–0.2)
PLATELET # BLD AUTO: 236 10*3/MM3 (ref 140–450)
PMV BLD AUTO: 9.5 FL (ref 6–12)
POTASSIUM BLD-SCNC: 5 MMOL/L (ref 3.5–5.2)
PROT SERPL-MCNC: 6.6 G/DL (ref 6–8.5)
PROTHROMBIN TIME: 13.4 SECONDS (ref 11.2–14.3)
RBC # BLD AUTO: 4.48 10*6/MM3 (ref 3.77–5.28)
SODIUM BLD-SCNC: 137 MMOL/L (ref 136–145)
T4 FREE SERPL-MCNC: 1.2 NG/DL (ref 0.93–1.7)
TSH SERPL DL<=0.05 MIU/L-ACNC: 4.63 MIU/ML (ref 0.27–4.2)
WBC NRBC COR # BLD: 6.47 10*3/MM3 (ref 3.4–10.8)

## 2019-08-09 PROCEDURE — 81001 URINALYSIS AUTO W/SCOPE: CPT | Performed by: EMERGENCY MEDICINE

## 2019-08-09 PROCEDURE — 70450 CT HEAD/BRAIN W/O DYE: CPT

## 2019-08-09 PROCEDURE — 25010000002 TDAP 5-2.5-18.5 LF-MCG/0.5 SUSPENSION: Performed by: EMERGENCY MEDICINE

## 2019-08-09 PROCEDURE — 96365 THER/PROPH/DIAG IV INF INIT: CPT

## 2019-08-09 PROCEDURE — 90715 TDAP VACCINE 7 YRS/> IM: CPT | Performed by: EMERGENCY MEDICINE

## 2019-08-09 PROCEDURE — 99285 EMERGENCY DEPT VISIT HI MDM: CPT

## 2019-08-09 PROCEDURE — 85025 COMPLETE CBC W/AUTO DIFF WBC: CPT | Performed by: EMERGENCY MEDICINE

## 2019-08-09 PROCEDURE — 80053 COMPREHEN METABOLIC PANEL: CPT | Performed by: EMERGENCY MEDICINE

## 2019-08-09 PROCEDURE — 93005 ELECTROCARDIOGRAM TRACING: CPT | Performed by: EMERGENCY MEDICINE

## 2019-08-09 PROCEDURE — 85610 PROTHROMBIN TIME: CPT | Performed by: EMERGENCY MEDICINE

## 2019-08-09 PROCEDURE — 84439 ASSAY OF FREE THYROXINE: CPT | Performed by: EMERGENCY MEDICINE

## 2019-08-09 PROCEDURE — 83735 ASSAY OF MAGNESIUM: CPT | Performed by: EMERGENCY MEDICINE

## 2019-08-09 PROCEDURE — 70486 CT MAXILLOFACIAL W/O DYE: CPT

## 2019-08-09 PROCEDURE — 84443 ASSAY THYROID STIM HORMONE: CPT | Performed by: EMERGENCY MEDICINE

## 2019-08-09 PROCEDURE — 90471 IMMUNIZATION ADMIN: CPT | Performed by: EMERGENCY MEDICINE

## 2019-08-09 PROCEDURE — 93005 ELECTROCARDIOGRAM TRACING: CPT

## 2019-08-09 RX ORDER — ESCITALOPRAM OXALATE 20 MG/1
20 TABLET ORAL DAILY
COMMUNITY
End: 2021-02-04

## 2019-08-09 RX ADMIN — SODIUM CHLORIDE 500 ML: 9 INJECTION, SOLUTION INTRAVENOUS at 22:14

## 2019-08-09 RX ADMIN — TETANUS TOXOID, REDUCED DIPHTHERIA TOXOID AND ACELLULAR PERTUSSIS VACCINE, ADSORBED 0.5 ML: 5; 2.5; 8; 8; 2.5 SUSPENSION INTRAMUSCULAR at 22:12

## 2019-08-09 RX ADMIN — NICARDIPINE HYDROCHLORIDE 5 MG/HR: 0.1 INJECTION, SOLUTION INTRAVENOUS at 22:16

## 2019-08-10 ENCOUNTER — APPOINTMENT (OUTPATIENT)
Dept: CARDIOLOGY | Facility: HOSPITAL | Age: 84
End: 2019-08-10

## 2019-08-10 ENCOUNTER — APPOINTMENT (OUTPATIENT)
Dept: MRI IMAGING | Facility: HOSPITAL | Age: 84
End: 2019-08-10

## 2019-08-10 ENCOUNTER — APPOINTMENT (OUTPATIENT)
Dept: CT IMAGING | Facility: HOSPITAL | Age: 84
End: 2019-08-10

## 2019-08-10 PROBLEM — I10 ACCELERATED HYPERTENSION: Status: ACTIVE | Noted: 2019-08-10

## 2019-08-10 PROBLEM — R55 SYNCOPE: Status: ACTIVE | Noted: 2019-08-10

## 2019-08-10 PROBLEM — D50.9 MICROCYTIC ANEMIA: Status: ACTIVE | Noted: 2019-08-10

## 2019-08-10 LAB
ANION GAP SERPL CALCULATED.3IONS-SCNC: 10 MMOL/L (ref 5–15)
BACTERIA UR QL AUTO: ABNORMAL /HPF
BH CV ECHO MEAS - AO MAX PG (FULL): 2.4 MMHG
BH CV ECHO MEAS - AO MAX PG: 4.3 MMHG
BH CV ECHO MEAS - AO MEAN PG (FULL): 1.3 MMHG
BH CV ECHO MEAS - AO MEAN PG: 2.3 MMHG
BH CV ECHO MEAS - AO ROOT AREA (BSA CORRECTED): 2.1
BH CV ECHO MEAS - AO ROOT AREA: 10.3 CM^2
BH CV ECHO MEAS - AO ROOT DIAM: 3.6 CM
BH CV ECHO MEAS - AO V2 MAX: 103.8 CM/SEC
BH CV ECHO MEAS - AO V2 MEAN: 70.2 CM/SEC
BH CV ECHO MEAS - AO V2 VTI: 24 CM
BH CV ECHO MEAS - AVA(I,A): 2.2 CM^2
BH CV ECHO MEAS - AVA(I,D): 2.2 CM^2
BH CV ECHO MEAS - AVA(V,A): 2 CM^2
BH CV ECHO MEAS - AVA(V,D): 2 CM^2
BH CV ECHO MEAS - BSA(HAYCOCK): 1.8 M^2
BH CV ECHO MEAS - BSA(HAYCOCK): 1.8 M^2
BH CV ECHO MEAS - BSA: 1.7 M^2
BH CV ECHO MEAS - BSA: 1.7 M^2
BH CV ECHO MEAS - BZI_BMI: 31.2 KILOGRAMS/M^2
BH CV ECHO MEAS - BZI_BMI: 31.2 KILOGRAMS/M^2
BH CV ECHO MEAS - BZI_METRIC_HEIGHT: 154.9 CM
BH CV ECHO MEAS - BZI_METRIC_HEIGHT: 154.9 CM
BH CV ECHO MEAS - BZI_METRIC_WEIGHT: 74.8 KG
BH CV ECHO MEAS - BZI_METRIC_WEIGHT: 74.8 KG
BH CV ECHO MEAS - EDV(CUBED): 89.8 ML
BH CV ECHO MEAS - EDV(TEICH): 91.4 ML
BH CV ECHO MEAS - EF(CUBED): 75 %
BH CV ECHO MEAS - EF(TEICH): 67.1 %
BH CV ECHO MEAS - ESV(CUBED): 22.4 ML
BH CV ECHO MEAS - ESV(TEICH): 30.1 ML
BH CV ECHO MEAS - FS: 37 %
BH CV ECHO MEAS - IVS/LVPW: 0.99
BH CV ECHO MEAS - IVSD: 1.1 CM
BH CV ECHO MEAS - LA DIMENSION: 3.5 CM
BH CV ECHO MEAS - LA/AO: 0.95
BH CV ECHO MEAS - LAD MAJOR: 5.4 CM
BH CV ECHO MEAS - LAT PEAK E' VEL: 7.5 CM/SEC
BH CV ECHO MEAS - LATERAL E/E' RATIO: 13.2
BH CV ECHO MEAS - LV MASS(C)D: 182.2 GRAMS
BH CV ECHO MEAS - LV MASS(C)DI: 104.7 GRAMS/M^2
BH CV ECHO MEAS - LV MAX PG: 1.9 MMHG
BH CV ECHO MEAS - LV MEAN PG: 0.96 MMHG
BH CV ECHO MEAS - LV V1 MAX: 68.6 CM/SEC
BH CV ECHO MEAS - LV V1 MEAN: 45 CM/SEC
BH CV ECHO MEAS - LV V1 VTI: 17.1 CM
BH CV ECHO MEAS - LVIDD: 4.5 CM
BH CV ECHO MEAS - LVIDS: 2.8 CM
BH CV ECHO MEAS - LVOT AREA (M): 3.1 CM^2
BH CV ECHO MEAS - LVOT AREA: 3.1 CM^2
BH CV ECHO MEAS - LVOT DIAM: 2 CM
BH CV ECHO MEAS - LVPWD: 1.1 CM
BH CV ECHO MEAS - MED PEAK E' VEL: 4.9 CM/SEC
BH CV ECHO MEAS - MEDIAL E/E' RATIO: 20.2
BH CV ECHO MEAS - MV A MAX VEL: 55.8 CM/SEC
BH CV ECHO MEAS - MV DEC TIME: 0.18 SEC
BH CV ECHO MEAS - MV E MAX VEL: 100.7 CM/SEC
BH CV ECHO MEAS - MV E/A: 1.8
BH CV ECHO MEAS - PA ACC SLOPE: 502.3 CM/SEC^2
BH CV ECHO MEAS - PA ACC TIME: 0.13 SEC
BH CV ECHO MEAS - PA MAX PG: 2.6 MMHG
BH CV ECHO MEAS - PA PR(ACCEL): 22 MMHG
BH CV ECHO MEAS - PA V2 MAX: 80.8 CM/SEC
BH CV ECHO MEAS - PI END-D VEL: 156.9 CM/SEC
BH CV ECHO MEAS - RAP SYSTOLE: 3 MMHG
BH CV ECHO MEAS - RVSP: 51 MMHG
BH CV ECHO MEAS - SI(AO): 142.5 ML/M^2
BH CV ECHO MEAS - SI(CUBED): 38.7 ML/M^2
BH CV ECHO MEAS - SI(LVOT): 30.1 ML/M^2
BH CV ECHO MEAS - SI(TEICH): 35.2 ML/M^2
BH CV ECHO MEAS - SV(AO): 248 ML
BH CV ECHO MEAS - SV(CUBED): 67.4 ML
BH CV ECHO MEAS - SV(LVOT): 52.4 ML
BH CV ECHO MEAS - SV(TEICH): 61.3 ML
BH CV ECHO MEAS - TAPSE (>1.6): 1.9 CM2
BH CV ECHO MEAS - TR MAX PG: 48 MMHG
BH CV ECHO MEAS - TR MAX VEL: 346.8 CM/SEC
BH CV ECHO MEASUREMENTS AVERAGE E/E' RATIO: 16.24
BH CV VAS BP RIGHT ARM: NORMAL MMHG
BH CV XLRA - RV BASE: 3.1 CM
BH CV XLRA - RV LENGTH: 6.2 CM
BH CV XLRA - RV MID: 2.5 CM
BH CV XLRA - TDI S': 11.7 CM/SEC
BH CV XLRA MEAS LEFT CCA RATIO VEL: 57 CM/SEC
BH CV XLRA MEAS LEFT DIST CCA EDV: 9.8 CM/SEC
BH CV XLRA MEAS LEFT DIST CCA PSV: 57.5 CM/SEC
BH CV XLRA MEAS LEFT DIST ICA EDV: 45.2 CM/SEC
BH CV XLRA MEAS LEFT DIST ICA PSV: 188.6 CM/SEC
BH CV XLRA MEAS LEFT ICA RATIO VEL: 69.7 CM/SEC
BH CV XLRA MEAS LEFT ICA/CCA RATIO: 1.2
BH CV XLRA MEAS LEFT MID CCA EDV: 16.1 CM/SEC
BH CV XLRA MEAS LEFT MID CCA PSV: 65.6 CM/SEC
BH CV XLRA MEAS LEFT MID ICA EDV: 19.2 CM/SEC
BH CV XLRA MEAS LEFT MID ICA PSV: 70.2 CM/SEC
BH CV XLRA MEAS LEFT PROX CCA EDV: 14 CM/SEC
BH CV XLRA MEAS LEFT PROX CCA PSV: 66.3 CM/SEC
BH CV XLRA MEAS LEFT PROX ECA PSV: 74.2 CM/SEC
BH CV XLRA MEAS LEFT PROX ICA EDV: 19.2 CM/SEC
BH CV XLRA MEAS LEFT PROX ICA PSV: 66.3 CM/SEC
BH CV XLRA MEAS LEFT PROX SCLA PSV: 98.5 CM/SEC
BH CV XLRA MEAS LEFT VERTEBRAL A EDV: 5.9 CM/SEC
BH CV XLRA MEAS LEFT VERTEBRAL A PSV: 30.3 CM/SEC
BH CV XLRA MEAS RIGHT CCA RATIO VEL: 49.7 CM/SEC
BH CV XLRA MEAS RIGHT DIST CCA EDV: 11.3 CM/SEC
BH CV XLRA MEAS RIGHT DIST CCA PSV: 50.3 CM/SEC
BH CV XLRA MEAS RIGHT DIST ICA EDV: 21.8 CM/SEC
BH CV XLRA MEAS RIGHT DIST ICA PSV: 72.9 CM/SEC
BH CV XLRA MEAS RIGHT ICA RATIO VEL: 69 CM/SEC
BH CV XLRA MEAS RIGHT ICA/CCA RATIO: 1.4
BH CV XLRA MEAS RIGHT MID CCA EDV: 13.2 CM/SEC
BH CV XLRA MEAS RIGHT MID CCA PSV: 54.1 CM/SEC
BH CV XLRA MEAS RIGHT MID ICA EDV: 17.9 CM/SEC
BH CV XLRA MEAS RIGHT MID ICA PSV: 69.4 CM/SEC
BH CV XLRA MEAS RIGHT PROX CCA EDV: 11.3 CM/SEC
BH CV XLRA MEAS RIGHT PROX CCA PSV: 52.2 CM/SEC
BH CV XLRA MEAS RIGHT PROX ECA PSV: 71.6 CM/SEC
BH CV XLRA MEAS RIGHT PROX ICA EDV: 16.2 CM/SEC
BH CV XLRA MEAS RIGHT PROX ICA PSV: 55.4 CM/SEC
BH CV XLRA MEAS RIGHT PROX SCLA PSV: 83 CM/SEC
BH CV XLRA MEAS RIGHT VERTEBRAL A EDV: 15.3 CM/SEC
BH CV XLRA MEAS RIGHT VERTEBRAL A PSV: 58.9 CM/SEC
BILIRUB UR QL STRIP: NEGATIVE
BUN BLD-MCNC: 17 MG/DL (ref 8–23)
BUN/CREAT SERPL: 19.8 (ref 7–25)
CALCIUM SPEC-SCNC: 9.2 MG/DL (ref 8.6–10.5)
CHLORIDE SERPL-SCNC: 102 MMOL/L (ref 98–107)
CHOLEST SERPL-MCNC: 184 MG/DL (ref 0–200)
CLARITY UR: CLEAR
CO2 SERPL-SCNC: 28 MMOL/L (ref 22–29)
COLOR UR: YELLOW
CREAT BLD-MCNC: 0.86 MG/DL (ref 0.57–1)
DEPRECATED RDW RBC AUTO: 52.6 FL (ref 37–54)
ERYTHROCYTE [DISTWIDTH] IN BLOOD BY AUTOMATED COUNT: 18.3 % (ref 12.3–15.4)
GFR SERPL CREATININE-BSD FRML MDRD: 63 ML/MIN/1.73
GLUCOSE BLD-MCNC: 109 MG/DL (ref 65–99)
GLUCOSE UR STRIP-MCNC: NEGATIVE MG/DL
HBA1C MFR BLD: 6.1 % (ref 4.8–5.6)
HCT VFR BLD AUTO: 36.1 % (ref 34–46.6)
HDLC SERPL-MCNC: 49 MG/DL (ref 40–60)
HGB BLD-MCNC: 11.1 G/DL (ref 12–15.9)
HGB UR QL STRIP.AUTO: NEGATIVE
HYALINE CASTS UR QL AUTO: ABNORMAL /LPF
KETONES UR QL STRIP: NEGATIVE
LDLC SERPL CALC-MCNC: 108 MG/DL (ref 0–100)
LDLC/HDLC SERPL: 2.21 {RATIO}
LEFT ATRIUM VOLUME INDEX: 29.9 ML/M^2
LEFT ATRIUM VOLUME: 52 ML
LEUKOCYTE ESTERASE UR QL STRIP.AUTO: ABNORMAL
LV EF 2D ECHO EST: 65 %
MCH RBC QN AUTO: 24.4 PG (ref 26.6–33)
MCHC RBC AUTO-ENTMCNC: 30.7 G/DL (ref 31.5–35.7)
MCV RBC AUTO: 79.3 FL (ref 79–97)
NITRITE UR QL STRIP: NEGATIVE
PH UR STRIP.AUTO: 7 [PH] (ref 5–8)
PLATELET # BLD AUTO: 239 10*3/MM3 (ref 140–450)
PMV BLD AUTO: 9.9 FL (ref 6–12)
POTASSIUM BLD-SCNC: 3.9 MMOL/L (ref 3.5–5.2)
PROT UR QL STRIP: NEGATIVE
RBC # BLD AUTO: 4.55 10*6/MM3 (ref 3.77–5.28)
RBC # UR: ABNORMAL /HPF
REF LAB TEST METHOD: ABNORMAL
SODIUM BLD-SCNC: 140 MMOL/L (ref 136–145)
SP GR UR STRIP: 1.02 (ref 1–1.03)
SQUAMOUS #/AREA URNS HPF: ABNORMAL /HPF
TRIGL SERPL-MCNC: 133 MG/DL (ref 0–150)
TROPONIN T SERPL-MCNC: <0.01 NG/ML (ref 0–0.03)
UROBILINOGEN UR QL STRIP: ABNORMAL
VLDLC SERPL-MCNC: 26.6 MG/DL
WBC NRBC COR # BLD: 6.65 10*3/MM3 (ref 3.4–10.8)
WBC UR QL AUTO: ABNORMAL /HPF

## 2019-08-10 PROCEDURE — 93880 EXTRACRANIAL BILAT STUDY: CPT

## 2019-08-10 PROCEDURE — 97166 OT EVAL MOD COMPLEX 45 MIN: CPT

## 2019-08-10 PROCEDURE — 96372 THER/PROPH/DIAG INJ SC/IM: CPT

## 2019-08-10 PROCEDURE — G0378 HOSPITAL OBSERVATION PER HR: HCPCS

## 2019-08-10 PROCEDURE — 80061 LIPID PANEL: CPT | Performed by: INTERNAL MEDICINE

## 2019-08-10 PROCEDURE — 93306 TTE W/DOPPLER COMPLETE: CPT

## 2019-08-10 PROCEDURE — 97162 PT EVAL MOD COMPLEX 30 MIN: CPT

## 2019-08-10 PROCEDURE — 99220 PR INITIAL OBSERVATION CARE/DAY 70 MINUTES: CPT | Performed by: INTERNAL MEDICINE

## 2019-08-10 PROCEDURE — 80048 BASIC METABOLIC PNL TOTAL CA: CPT | Performed by: PHYSICIAN ASSISTANT

## 2019-08-10 PROCEDURE — 83036 HEMOGLOBIN GLYCOSYLATED A1C: CPT | Performed by: INTERNAL MEDICINE

## 2019-08-10 PROCEDURE — 85027 COMPLETE CBC AUTOMATED: CPT | Performed by: PHYSICIAN ASSISTANT

## 2019-08-10 PROCEDURE — 93880 EXTRACRANIAL BILAT STUDY: CPT | Performed by: INTERNAL MEDICINE

## 2019-08-10 PROCEDURE — 93306 TTE W/DOPPLER COMPLETE: CPT | Performed by: INTERNAL MEDICINE

## 2019-08-10 PROCEDURE — 71275 CT ANGIOGRAPHY CHEST: CPT

## 2019-08-10 PROCEDURE — 70551 MRI BRAIN STEM W/O DYE: CPT

## 2019-08-10 PROCEDURE — 0 IOPAMIDOL PER 1 ML: Performed by: INTERNAL MEDICINE

## 2019-08-10 PROCEDURE — 84484 ASSAY OF TROPONIN QUANT: CPT | Performed by: PHYSICIAN ASSISTANT

## 2019-08-10 PROCEDURE — 25010000002 HEPARIN (PORCINE) PER 1000 UNITS: Performed by: INTERNAL MEDICINE

## 2019-08-10 RX ORDER — ONDANSETRON 2 MG/ML
4 INJECTION INTRAMUSCULAR; INTRAVENOUS EVERY 6 HOURS PRN
Status: DISCONTINUED | OUTPATIENT
Start: 2019-08-10 | End: 2019-08-11 | Stop reason: HOSPADM

## 2019-08-10 RX ORDER — ESCITALOPRAM OXALATE 20 MG/1
20 TABLET ORAL DAILY
Status: DISCONTINUED | OUTPATIENT
Start: 2019-08-10 | End: 2019-08-11 | Stop reason: HOSPADM

## 2019-08-10 RX ORDER — HEPARIN SODIUM 5000 [USP'U]/ML
5000 INJECTION, SOLUTION INTRAVENOUS; SUBCUTANEOUS EVERY 12 HOURS SCHEDULED
Status: DISCONTINUED | OUTPATIENT
Start: 2019-08-10 | End: 2019-08-11 | Stop reason: HOSPADM

## 2019-08-10 RX ORDER — SODIUM CHLORIDE 0.9 % (FLUSH) 0.9 %
3-10 SYRINGE (ML) INJECTION AS NEEDED
Status: DISCONTINUED | OUTPATIENT
Start: 2019-08-10 | End: 2019-08-11 | Stop reason: HOSPADM

## 2019-08-10 RX ORDER — SODIUM CHLORIDE 0.9 % (FLUSH) 0.9 %
3 SYRINGE (ML) INJECTION EVERY 12 HOURS SCHEDULED
Status: DISCONTINUED | OUTPATIENT
Start: 2019-08-10 | End: 2019-08-11 | Stop reason: HOSPADM

## 2019-08-10 RX ORDER — VALSARTAN 80 MG/1
80 TABLET ORAL DAILY
Status: DISCONTINUED | OUTPATIENT
Start: 2019-08-10 | End: 2019-08-11 | Stop reason: HOSPADM

## 2019-08-10 RX ORDER — HEPARIN SODIUM 5000 [USP'U]/ML
5000 INJECTION, SOLUTION INTRAVENOUS; SUBCUTANEOUS EVERY 12 HOURS SCHEDULED
Status: DISCONTINUED | OUTPATIENT
Start: 2019-08-10 | End: 2019-08-10

## 2019-08-10 RX ORDER — LABETALOL 200 MG/1
100 TABLET, FILM COATED ORAL 2 TIMES DAILY
Status: DISCONTINUED | OUTPATIENT
Start: 2019-08-10 | End: 2019-08-10

## 2019-08-10 RX ORDER — LABETALOL 200 MG/1
100 TABLET, FILM COATED ORAL EVERY 8 HOURS SCHEDULED
Status: DISCONTINUED | OUTPATIENT
Start: 2019-08-10 | End: 2019-08-11 | Stop reason: HOSPADM

## 2019-08-10 RX ORDER — AMLODIPINE BESYLATE 5 MG/1
5 TABLET ORAL
Status: DISCONTINUED | OUTPATIENT
Start: 2019-08-10 | End: 2019-08-10

## 2019-08-10 RX ORDER — LEVOTHYROXINE SODIUM 0.05 MG/1
50 TABLET ORAL
Status: DISCONTINUED | OUTPATIENT
Start: 2019-08-10 | End: 2019-08-11 | Stop reason: HOSPADM

## 2019-08-10 RX ORDER — ACETAMINOPHEN 325 MG/1
650 TABLET ORAL EVERY 4 HOURS PRN
Status: DISCONTINUED | OUTPATIENT
Start: 2019-08-10 | End: 2019-08-11 | Stop reason: HOSPADM

## 2019-08-10 RX ORDER — CHOLECALCIFEROL (VITAMIN D3) 125 MCG
5 CAPSULE ORAL NIGHTLY PRN
Status: DISCONTINUED | OUTPATIENT
Start: 2019-08-10 | End: 2019-08-11 | Stop reason: HOSPADM

## 2019-08-10 RX ORDER — LABETALOL 200 MG/1
100 TABLET, FILM COATED ORAL EVERY 12 HOURS SCHEDULED
Status: DISCONTINUED | OUTPATIENT
Start: 2019-08-10 | End: 2019-08-10

## 2019-08-10 RX ORDER — PANTOPRAZOLE SODIUM 40 MG/1
40 TABLET, DELAYED RELEASE ORAL DAILY
Status: DISCONTINUED | OUTPATIENT
Start: 2019-08-10 | End: 2019-08-11 | Stop reason: HOSPADM

## 2019-08-10 RX ADMIN — LEVOTHYROXINE SODIUM 50 MCG: 50 TABLET ORAL at 05:22

## 2019-08-10 RX ADMIN — LABETALOL HYDROCHLORIDE 100 MG: 200 TABLET, FILM COATED ORAL at 08:53

## 2019-08-10 RX ADMIN — VALSARTAN 80 MG: 80 TABLET, FILM COATED ORAL at 08:48

## 2019-08-10 RX ADMIN — HEPARIN SODIUM 5000 UNITS: 5000 INJECTION INTRAVENOUS; SUBCUTANEOUS at 20:49

## 2019-08-10 RX ADMIN — LABETALOL HYDROCHLORIDE 100 MG: 200 TABLET, FILM COATED ORAL at 17:48

## 2019-08-10 RX ADMIN — SODIUM CHLORIDE, PRESERVATIVE FREE 3 ML: 5 INJECTION INTRAVENOUS at 08:49

## 2019-08-10 RX ADMIN — ACETAMINOPHEN 650 MG: 325 TABLET ORAL at 02:38

## 2019-08-10 RX ADMIN — PANTOPRAZOLE SODIUM 40 MG: 40 TABLET, DELAYED RELEASE ORAL at 08:48

## 2019-08-10 RX ADMIN — ESCITALOPRAM OXALATE 20 MG: 20 TABLET ORAL at 08:48

## 2019-08-10 RX ADMIN — NITROGLYCERIN 0.5 INCH: 20 OINTMENT TOPICAL at 20:44

## 2019-08-10 RX ADMIN — IOPAMIDOL 75 ML: 755 INJECTION, SOLUTION INTRAVENOUS at 06:00

## 2019-08-10 NOTE — PLAN OF CARE
Problem: Patient Care Overview  Goal: Plan of Care Review  Outcome: Ongoing (interventions implemented as appropriate)   08/10/19 8715   Coping/Psychosocial   Plan of Care Reviewed With patient;daughter   Plan of Care Review   Progress improving   OTHER   Outcome Summary Pt presents with fxl decline from PLOF, deficits in ADL performance, fxl mobility and occupational endurance. Will benefit from skilled OT services to address deficits, facilitate increased fxl I. Recommend home with assist, HHOT.

## 2019-08-10 NOTE — H&P
"    [facility] Medicine Services  HISTORY AND PHYSICAL    Patient Name: Anitra Dee  : 1935  MRN: [id number]  Primary Care Physician: Eleuterio Myers MD  Date of admission: 2019      Subjective   Subjective     Chief Complaint:  syncope    HPI:  Anitra Dee is an 83 y.o. female with a past medical history significant for HTN, hypothyroidism, GERD, and asthma who presents s/p syncopal episode. Patient states she babysitting her great gandson at home alone. She remembers walking into the kitchen and \"doesn't remember much after that\".  The next thing she remembers is being in the ambulance.  She apparently awakened on the kitchen floor and called her daughter who alerted neighbor, but patient does not have any memory of these events.  Daughter states the patient was confused and asking things that she should have known.  Neighbor came to check on the patient. Ended up calling EMS. The patient has a laceration above the left eyelid and complaints of dull frontal headache. She is unsure or pre-syncopal symptoms including chest pain, headache, or SOB. She currently denies focal weakness or paresthesias. Reports compliance with medications and denies changes. She has no history of DVT/PE, CVA/TIA, or COPD/CHF. Will admit for further evaluation and treatment.    Emergency Department Evaluation; hypertensive to 230/91. Vitals otherwise stable. TSH 4.63. Chemistry and hematology otherwise favorable. CXR clear. CT head negative. EKG normal sinus.    Review of Systems   Unable to perform ROS: Other          Otherwise complete ROS reviewed and is negative except as mentioned in the HPI.    Personal History     Past Medical History:   Diagnosis Date   • Arthritis    • Asthma    • GERD (gastroesophageal reflux disease)    • Hypertension    • Hypothyroid    • Pneumonia    • Post menopausal syndrome        Past Surgical History:   Procedure Laterality Date   • HYSTERECTOMY     • KNEE " ARTHROPLASTY     • REPAIR PELVIC FLOOR DEFECT VAGINAL APPROACH W/ MESH     • SHOULDER ARTHROTOMY         Family History: family history includes Cancer in her mother; Lung disease in her mother; Other in her father. Otherwise pertinent FHx was reviewed and unremarkable.     Social History:  reports that she has quit smoking. She has never used smokeless tobacco. She reports that she does not drink alcohol or use drugs.  Social History     Social History Narrative   • Not on file       Medications:    Available home medication information reviewed.  Medications Prior to Admission   Medication Sig Dispense Refill Last Dose   • CloNIDine (CATAPRES) 0.1 MG tablet Take 1 tablet by mouth 2 (Two) Times a Day As Needed for High Blood Pressure (Systolic blood pressure greater than 180). 16 tablet 0    • escitalopram (LEXAPRO) 20 MG tablet Take 20 mg by mouth Daily.      • estrogens, conjugated, (PREMARIN) 0.3 MG tablet Take 0.3 mg by mouth Daily.      • labetalol (NORMODYNE) 100 MG tablet Take 1 tablet by mouth 2 (Two) Times a Day. 30 tablet 0    • levothyroxine (SYNTHROID, LEVOTHROID) 50 MCG tablet Take 75 mcg by mouth Daily.      • omeprazole (priLOSEC) 40 MG capsule Take 40 mg by mouth Daily.      • valsartan (DIOVAN) 80 MG tablet Take 80 mg by mouth Daily.          Allergies   Allergen Reactions   • Latex Hives       Objective   Objective     Vital Signs:   Temp:  [98 °F (36.7 °C)] 98 °F (36.7 °C)  Heart Rate:  [61-70] 65  Resp:  [12] 12  BP: (145-230)/(63-91) 156/84        Physical Exam   Constitutional: Awake, alert  Eyes: PERRLA, sclerae anicteric, no conjunctival injection  HENT: NCAT, mucous membranes moist laceration to left upper eye  Neck: Supple, no thyromegaly, no lymphadenopathy, trachea midline  Respiratory: Clear to auscultation bilaterally, nonlabored respirations   Cardiovascular: RRR, no murmurs, rubs, or gallops, palpable pedal pulses bilaterally  Gastrointestinal: Positive bowel sounds, soft, nontender,  nondistended  Musculoskeletal: No bilateral ankle edema, no clubbing or cyanosis to extremities  Psychiatric: Appropriate affect, cooperative  Neurologic: Oriented x 3, strength symmetric in all extremities, Cranial Nerves grossly intact to confrontation, speech clear  Skin: No rashes      Results Reviewed:  I have personally reviewed current lab, radiology, and data and agree.    Results from last 7 days   Lab Units 08/10/19  0259 08/09/19 2208   WBC 10*3/mm3 6.65  --    HEMOGLOBIN g/dL 11.1*  --    HEMATOCRIT % 36.1  --    PLATELETS 10*3/mm3 239  --    INR   --  1.07     Results from last 7 days   Lab Units 08/10/19  0259 08/09/19  2123   SODIUM mmol/L 140 137   POTASSIUM mmol/L 3.9 5.0   CHLORIDE mmol/L 102 99   CO2 mmol/L 28.0 25.0   BUN mg/dL 17 21   CREATININE mg/dL 0.86 0.91   GLUCOSE mg/dL 109* 107*   CALCIUM mg/dL 9.2 9.6   ALT (SGPT) U/L  --  10   AST (SGOT) U/L  --  23   TROPONIN T ng/mL <0.010  --      Estimated Creatinine Clearance: 47.5 mL/min (by C-G formula based on SCr of 0.86 mg/dL).  Brief Urine Lab Results  (Last result in the past 365 days)      Color   Clarity   Blood   Leuk Est   Nitrite   Protein   CREAT   Urine HCG        11/04/18 2206 Yellow Clear Negative Negative Negative Negative             Imaging Results (last 24 hours)     Procedure Component Value Units Date/Time    CT Facial Bones Without Contrast [[id number]] Collected:  08/09/19 2216     Updated:  08/09/19 2218    Narrative:       CT Max Facial Area WO    INDICATION:   Facial bruising and laceration after fall striking head    TECHNIQUE:   Maxillofacial CT without IV contrast. Coronal and sagittal reconstructions were obtained.  Radiation dose reduction techniques included automated exposure control or exposure modulation based on body size. Count of known CT and cardiac nuc med studies  performed in previous 12 months: 1.     COMPARISON:    None available.    FINDINGS:  There is no fracture. The orbital soft tissues are normal.  There is no foreign body.    Incidental note made of right parotid sialolithiasis, but no acute abnormality is seen.      Impression:       No acute abnormality.    Signer Name: Nasir Valero MD   Signed: 8/9/2019 10:16 PM   Workstation [facility]    [facility]    CT Head Without Contrast [[id number]] Collected:  08/09/19 2213     Updated:  08/09/19 2215    Narrative:       CT Head WO: 8/9/2019 10:59 PM    HISTORY:   Headache after fall tonight with closed head injury.    TECHNIQUE:   Axial unenhanced head CT. Radiation dose reduction techniques included automated exposure control or exposure modulation based on body size. Radiation audit for number of CT and nuclear cardiology exams performed in the last year: 1.      COMPARISON:   None.    FINDINGS:   No intracranial hemorrhage, mass, or infarct. No hydrocephalus or extra-axial fluid collection. Brain parenchymal density is within normal limits for age.     The skull base, calvarium, and extracranial soft tissues are normal.      Impression:       Normal, negative unenhanced head CT.    Signer Name: Nasir Valero MD   Signed: 8/9/2019 10:13 PM   Workstation [facility]: [facility]    [facility]             Assessment/Plan   Assessment / Plan     Active Hospital Problems    Diagnosis POA   • **Syncope [R55] Yes   • Accelerated hypertension [I10] Yes   • Microcytic anemia [D50.9] Unknown   • Hypothyroid [E03.9] Yes   • GERD (gastroesophageal reflux disease) [K21.9] Yes   • Asthma [J45.909] Yes   • Arthritis [M19.90] Yes         1.  Loss of consciousness, syncope:  - on metoprolol 100 BID. Hold for now.  - trend cardiac enzymes  - check orthostatics  - PT treat and eval  - obtain echocardiogram, bilateral carotid doppler  - consider MRI brain. No current focal deficits however  - am labs  -On estrogen therapy, will get CTA to rule out PE  -Confusion for about 30 minutes after loss of consciousness, will get  EEG.  Consider neurology consult  -May need Holter monitor at DC    2. Accelerated hypertension:  - holding BB as above. Resume as tolerated  - also on clonidine as needed  - continue diovan  - started on cardene gtt. Continue for now and maintain pressure between 180-200  - continue with plan above    3. Asthma:  - non smoker. Prn pulmonary toilet    4.  Microcytic anemia  -Stable prior studies    5. Hypothyroidism:  - will need to up titrate synthroid and recheck in 4-5 weeks    DVT prophylaxis: mechanical    CODE STATUS:  Full code  Code Status and Medical Interventions:   Ordered at: 08/10/19 0137     Level Of Support Discussed With:    Patient     Code Status:    CPR     Medical Interventions (Level of Support Prior to Arrest):    Full       Admission Status:  I believe this patient meets OBSERVATION status, however if further evaluation or treatment plans warrant, status may change.  Based upon current information, I predict patient's care encounter to be less than or equal to 2 midnights.      Electronically signed by Reginaldo Floyd PA-C, 08/10/19, 2:12 AM.        Brief Attending Admission Attestation     I have seen and examined the patient, performing an independent face-to-face diagnostic evaluation with plan of care reviewed and developed with the advanced practice clinician (APC).      Brief Summary Statement:   Anitra Dee is a 83 y.o. female with a PMH significant for hypothyroidism, GERD, asthma, hypertension who presents to the ED after a syncopal episode at home.  She was babysitting her 3-year-old grandson playing with him.  She has a lapse of memory with the next thing she remembers being in an ambulance.  Apparently while on the floor she called daughter and per daughter was confused during the conversation.  Daughter alerted neighbor who came to assist the patient and ultimately called EMS.  She was brought her evaluation and care.      Remainder of detailed HPI is as noted above and has  been reviewed and/or edited by me for completeness.      Attending Physical Exam:  Constitutional: Awake, alert  Eyes: PERRLA, sclerae anicteric, no conjunctival injection  HENT: NCAT, mucous membranes moist, bruising and abrasions to face and forehead  Neck: Supple, no thyromegaly, no lymphadenopathy, trachea midline  Respiratory: Clear to auscultation bilaterally, nonlabored respirations   Cardiovascular: RRR, no murmurs, rubs, or gallops, palpable pedal pulses bilaterally  Gastrointestinal: Positive bowel sounds, soft, nontender, nondistended  Musculoskeletal: No bilateral ankle edema, no clubbing or cyanosis to extremities  Psychiatric: Appropriate affect, cooperative  Neurologic: Oriented x 3, strength symmetric in all extremities, Cranial Nerves grossly intact to confrontation, speech clear  Skin: Petechial-like rash on bilateral arms        Brief Assessment/Plan :  See above for further detailed assessment and plan developed with APC which I have reviewed and/or edited for completeness.      Electronically signed by Noelle Pinzon DO, 08/10/19, 3:31 AM.

## 2019-08-10 NOTE — PLAN OF CARE
Problem: Patient Care Overview  Goal: Plan of Care Review  Outcome: Ongoing (interventions implemented as appropriate)   08/10/19 1431   Coping/Psychosocial   Plan of Care Reviewed With patient   Plan of Care Review   Progress improving   OTHER   Outcome Summary Patient resting in bed. Denies pain or discomfort. Room air. Denies dizziness. Evaluated by PT. Blood pressures remain elevated but better controlled. Family at bedside. Fall precautions in use. Call light in reach to make needs known.       Problem: Fall Risk (Adult)  Goal: Identify Related Risk Factors and Signs and Symptoms  Outcome: Ongoing (interventions implemented as appropriate)

## 2019-08-10 NOTE — PLAN OF CARE
Problem: Patient Care Overview  Goal: Plan of Care Review  Outcome: Ongoing (interventions implemented as appropriate)   08/10/19 0618   Coping/Psychosocial   Plan of Care Reviewed With patient   Plan of Care Review   Progress improving

## 2019-08-10 NOTE — ED PROVIDER NOTES
"Subjective   83-year-old female presents for evaluation following a fall this evening.  The patient was babysitting her grandchild when she had an unwitnessed episode in which she either fell to the ground or \"passed out.\"  The patient is unsure as to what happened exactly.  She does not recall the episode.  No family history of sudden cardiac death.  The patient has no prior history of recurrent syncope.  She denies any preceding chest pain, shortness of breath, palpitations, or precipitating factors for her syncopal episode.  However, following the event she hit her head on the ground.  Positive LOC.  No neck pain.  Since the event, the patient has been complaining of persistent headache and has also appeared confused and has been exhibiting worrisome symptoms such as dizziness, headache, and repetitive questioning.  Unknown tetanus status.  She is otherwise without complaint at this time.        History provided by:  Patient  Fall   Mechanism of injury: fall    Injury location:  Head/neck  Head/neck injury location:  Head  Incident location:  Home  Arrived directly from scene: yes    Fall:     Impact surface:  Wall    Point of impact:  Head  Protective equipment: none    Suspicion of alcohol use: no    Suspicion of drug use: no    Tetanus status:  Out of date  Prior to arrival data:     Patient ambulatory at scene: no      Loss of consciousness: unknown.      Amnesic to event: yes    Associated symptoms: headaches (resolved)    Associated symptoms: no abdominal pain and no chest pain    Risk factors: asthma        Review of Systems   Eyes: Negative for visual disturbance.   Cardiovascular: Negative for chest pain.   Gastrointestinal: Negative for abdominal pain.   Musculoskeletal: Positive for arthralgias (chronic bilateral hip pain.).   Neurological: Positive for headaches (resolved).   Psychiatric/Behavioral: Positive for confusion (repeating questions).   All other systems reviewed and are negative.      Past " Medical History:   Diagnosis Date   • Arthritis    • Asthma    • GERD (gastroesophageal reflux disease)    • Hypertension    • Hypothyroid    • Pneumonia    • Post menopausal syndrome        Allergies   Allergen Reactions   • Latex Hives       Past Surgical History:   Procedure Laterality Date   • HYSTERECTOMY     • KNEE ARTHROPLASTY     • REPAIR PELVIC FLOOR DEFECT VAGINAL APPROACH W/ MESH     • SHOULDER ARTHROTOMY         Family History   Problem Relation Age of Onset   • Cancer Mother    • Lung disease Mother    • Other Father        Social History     Socioeconomic History   • Marital status: Unknown     Spouse name: Not on file   • Number of children: Not on file   • Years of education: Not on file   • Highest education level: Not on file   Tobacco Use   • Smoking status: Former Smoker   • Smokeless tobacco: Never Used   Substance and Sexual Activity   • Alcohol use: No   • Drug use: No   • Sexual activity: Defer         Objective   Physical Exam   Constitutional: She is oriented to person, place, and time. She appears well-developed and well-nourished. No distress.   Nontoxic-appearing elderly female in no acute distress   HENT:   Head: Normocephalic.   Mouth/Throat: Oropharynx is clear and moist.   Eyes: EOM are normal. Pupils are equal, round, and reactive to light.   Neck:   No C-spine tenderness to palpation, no step-offs or deformities noted   Cardiovascular: Normal rate, regular rhythm, normal heart sounds and intact distal pulses. Exam reveals no gallop and no friction rub.   No murmur heard.  Pulmonary/Chest: Effort normal and breath sounds normal. No respiratory distress. She has no wheezes. She has no rales.   Abdominal: Soft. Bowel sounds are normal. She exhibits no distension and no mass. There is no tenderness. There is no rebound and no guarding.   Musculoskeletal: Normal range of motion.   Neurological: She is alert and oriented to person, place, and time. No cranial nerve deficit or sensory  deficit. Coordination normal.   5 out of 5 strength in all fours, neurovascularly intact distally in all fours of bounding distal pulses normal sensation, no ataxia, no dysmetria, clear and fluent speech, no drift, no drift   Skin: Skin is warm and dry. No rash noted. She is not diaphoretic. No erythema.   Small abrasion overlying left eyebrow without active bleeding   Psychiatric: She has a normal mood and affect. Judgment and thought content normal.   Nursing note and vitals reviewed.      Critical Care  Performed by: Isma Callahan MD  Authorized by: Isma Callahan MD     Critical care provider statement:     Critical care time (minutes):  35    Critical care time was exclusive of:  Separately billable procedures and treating other patients    Critical care was necessary to treat or prevent imminent or life-threatening deterioration of the following conditions: hypertensive emergency.    Critical care was time spent personally by me on the following activities:  Discussions with consultants, development of treatment plan with patient or surrogate, examination of patient, review of old charts, ordering and review of laboratory studies, ordering and review of radiographic studies, ordering and performing treatments and interventions, re-evaluation of patient's condition, obtaining history from patient or surrogate, pulse oximetry and evaluation of patient's response to treatment    I assumed direction of critical care for this patient from another provider in my specialty: no    Comments:      Dr. Callahan attended to the patient quickly.               ED Course  ED Course as of Aug 10 0455   Sat Aug 10, 2019   0451 83-year-old female presents for evaluation of syncopal episode, headache, dizziness, confusion, and repetitive questioning.  Just prior to coming to the emergency department this evening, the patient was babysitting her grandchild when she passed out.  She suffered an abrasion to her face during  the fall.  Positive LOC.  After the event she was exhibiting the aforementioned symptoms and EMS was called.  She was noted to be markedly hypertensive with systolic blood pressure of 240+ prior to coming to the ED.  On arrival, patient nontoxic-appearing.  She appears mildly confused.  NEXUS negative.  Tetanus updated.  No focal neurological deficits noted on exam.  Patient markedly hypertensive with a systolic blood pressure of greater than 230.  CT head negative.  CT face negative.  Labs unrevealing.  No family history of sudden cardiac death.  EKG revealed sinus rhythm with a heart rate of 71 and no ST segments suggestive of or concerning for ischemia.  The patient's clinical presentation appears consistent with hypertensive urgency/emergency versus syncopal episode with minor head injury/concussion.  Given her symptoms and markedly elevated blood pressure, I feel that she warrants inpatient treatment at this time.  Cardene drip initiated for blood pressure control.  Goal systolic blood pressure of 170-200.  I discussed the patient's case with Dr. Pinzon, and the patient will be admitted under her care.  The patient is heme dynamically stable at this time and aware/agreeable with the plan.  Her facial wound was dressed and Steri-Strips applied.  [DD]      ED Course User Index  [DD] Isma Callahan MD       Recent Results (from the past 24 hour(s))   CBC Auto Differential    Collection Time: 08/09/19  9:23 PM   Result Value Ref Range    WBC 6.47 3.40 - 10.80 10*3/mm3    RBC 4.48 3.77 - 5.28 10*6/mm3    Hemoglobin 11.1 (L) 12.0 - 15.9 g/dL    Hematocrit 35.4 34.0 - 46.6 %    MCV 79.0 79.0 - 97.0 fL    MCH 24.8 (L) 26.6 - 33.0 pg    MCHC 31.4 (L) 31.5 - 35.7 g/dL    RDW 18.2 (H) 12.3 - 15.4 %    RDW-SD 52.5 37.0 - 54.0 fl    MPV 9.5 6.0 - 12.0 fL    Platelets 236 140 - 450 10*3/mm3    Neutrophil % 65.0 42.7 - 76.0 %    Lymphocyte % 24.1 19.6 - 45.3 %    Monocyte % 8.5 5.0 - 12.0 %    Eosinophil % 1.5 0.3 - 6.2  %    Basophil % 0.6 0.0 - 1.5 %    Immature Grans % 0.3 0.0 - 0.5 %    Neutrophils, Absolute 4.20 1.70 - 7.00 10*3/mm3    Lymphocytes, Absolute 1.56 0.70 - 3.10 10*3/mm3    Monocytes, Absolute 0.55 0.10 - 0.90 10*3/mm3    Eosinophils, Absolute 0.10 0.00 - 0.40 10*3/mm3    Basophils, Absolute 0.04 0.00 - 0.20 10*3/mm3    Immature Grans, Absolute 0.02 0.00 - 0.05 10*3/mm3    nRBC 0.0 0.0 - 0.2 /100 WBC   Comprehensive Metabolic Panel    Collection Time: 08/09/19  9:23 PM   Result Value Ref Range    Glucose 107 (H) 65 - 99 mg/dL    BUN 21 8 - 23 mg/dL    Creatinine 0.91 0.57 - 1.00 mg/dL    Sodium 137 136 - 145 mmol/L    Potassium 5.0 3.5 - 5.2 mmol/L    Chloride 99 98 - 107 mmol/L    CO2 25.0 22.0 - 29.0 mmol/L    Calcium 9.6 8.6 - 10.5 mg/dL    Total Protein 6.6 6.0 - 8.5 g/dL    Albumin 3.80 3.50 - 5.20 g/dL    ALT (SGPT) 10 1 - 33 U/L    AST (SGOT) 23 1 - 32 U/L    Alkaline Phosphatase 50 39 - 117 U/L    Total Bilirubin <0.2 (L) 0.2 - 1.2 mg/dL    eGFR Non African Amer 59 (L) >60 mL/min/1.73    Globulin 2.8 gm/dL    A/G Ratio 1.4 g/dL    BUN/Creatinine Ratio 23.1 7.0 - 25.0    Anion Gap 13.0 5.0 - 15.0 mmol/L   TSH    Collection Time: 08/09/19  9:23 PM   Result Value Ref Range    TSH 4.630 (H) 0.270 - 4.200 mIU/mL   T4, Free    Collection Time: 08/09/19  9:23 PM   Result Value Ref Range    Free T4 1.20 0.93 - 1.70 ng/dL   Magnesium    Collection Time: 08/09/19  9:23 PM   Result Value Ref Range    Magnesium 2.2 1.6 - 2.4 mg/dL   Protime-INR    Collection Time: 08/09/19 10:08 PM   Result Value Ref Range    Protime 13.4 11.2 - 14.3 Seconds    INR 1.07 0.85 - 1.16   CBC (No Diff)    Collection Time: 08/10/19  2:59 AM   Result Value Ref Range    WBC 6.65 3.40 - 10.80 10*3/mm3    RBC 4.55 3.77 - 5.28 10*6/mm3    Hemoglobin 11.1 (L) 12.0 - 15.9 g/dL    Hematocrit 36.1 34.0 - 46.6 %    MCV 79.3 79.0 - 97.0 fL    MCH 24.4 (L) 26.6 - 33.0 pg    MCHC 30.7 (L) 31.5 - 35.7 g/dL    RDW 18.3 (H) 12.3 - 15.4 %    RDW-SD 52.6 37.0  - 54.0 fl    MPV 9.9 6.0 - 12.0 fL    Platelets 239 140 - 450 10*3/mm3     Note: In addition to lab results from this visit, the labs listed above may include labs taken at another facility or during a different encounter within the last 24 hours. Please correlate lab times with ED admission and discharge times for further clarification of the services performed during this visit.    CT Facial Bones Without Contrast   Final Result   No acute abnormality.      Signer Name: Nasir Valero MD    Signed: 8/9/2019 10:16 PM    Workstation Name: [email or web address]     Radiology [facility]      CT Head Without Contrast   Final Result   Normal, negative unenhanced head CT.      Signer Name: Nasir Valero MD    Signed: 8/9/2019 10:13 PM    Workstation Name: [facility]     [facility]        Vitals:    08/10/19 0101 08/10/19 0223 08/10/19 0227 08/10/19 0230   BP:  (!) 196/73 179/81 156/84   Pulse: 61  65 65   Resp:       Temp:       TempSrc:       SpO2: 93% 94% 93% 93%   Weight:       Height:         Medications   niCARdipine (CARDENE-IV) 20 mg/200 mL (0.1 mg/mL) in 0.9% NaCl infusion (0 mg/hr Intravenous Hold 8/9/19 2340)   escitalopram (LEXAPRO) tablet 20 mg (not administered)   levothyroxine (SYNTHROID, LEVOTHROID) tablet 50 mcg (not administered)   valsartan (DIOVAN) tablet 80 mg (not administered)   pantoprazole (PROTONIX) EC tablet 40 mg (not administered)   sodium chloride 0.9 % flush 3 mL ( Intravenous Canceled Entry 8/10/19 0240)   sodium chloride 0.9 % flush 3-10 mL (not administered)   acetaminophen (TYLENOL) tablet 650 mg (650 mg Oral Given 8/10/19 0238)   melatonin tablet 5 mg (not administered)   ondansetron (ZOFRAN) injection 4 mg (not administered)   Tdap (BOOSTRIX) injection 0.5 mL (0.5 mL Intramuscular Given 8/9/19 2212)   sodium chloride 0.9 % bolus 500 mL (0 mL Intravenous Stopped 8/9/19 2244)     ECG/EMG Results (last 24 hours)     Procedure Component Value Units  Date/Time    ECG 12 Lead [[id number]] Collected:  08/09/19 2100     Updated:  08/09/19 2102        ECG 12 Lead         ECG 12 Lead    (Results Pending)               Recent Results (from the past 24 hour(s))   CBC Auto Differential    Collection Time: 08/09/19  9:23 PM   Result Value Ref Range    WBC 6.47 3.40 - 10.80 10*3/mm3    RBC 4.48 3.77 - 5.28 10*6/mm3    Hemoglobin 11.1 (L) 12.0 - 15.9 g/dL    Hematocrit 35.4 34.0 - 46.6 %    MCV 79.0 79.0 - 97.0 fL    MCH 24.8 (L) 26.6 - 33.0 pg    MCHC 31.4 (L) 31.5 - 35.7 g/dL    RDW 18.2 (H) 12.3 - 15.4 %    RDW-SD 52.5 37.0 - 54.0 fl    MPV 9.5 6.0 - 12.0 fL    Platelets 236 140 - 450 10*3/mm3    Neutrophil % 65.0 42.7 - 76.0 %    Lymphocyte % 24.1 19.6 - 45.3 %    Monocyte % 8.5 5.0 - 12.0 %    Eosinophil % 1.5 0.3 - 6.2 %    Basophil % 0.6 0.0 - 1.5 %    Immature Grans % 0.3 0.0 - 0.5 %    Neutrophils, Absolute 4.20 1.70 - 7.00 10*3/mm3    Lymphocytes, Absolute 1.56 0.70 - 3.10 10*3/mm3    Monocytes, Absolute 0.55 0.10 - 0.90 10*3/mm3    Eosinophils, Absolute 0.10 0.00 - 0.40 10*3/mm3    Basophils, Absolute 0.04 0.00 - 0.20 10*3/mm3    Immature Grans, Absolute 0.02 0.00 - 0.05 10*3/mm3    nRBC 0.0 0.0 - 0.2 /100 WBC   Comprehensive Metabolic Panel    Collection Time: 08/09/19  9:23 PM   Result Value Ref Range    Glucose 107 (H) 65 - 99 mg/dL    BUN 21 8 - 23 mg/dL    Creatinine 0.91 0.57 - 1.00 mg/dL    Sodium 137 136 - 145 mmol/L    Potassium 5.0 3.5 - 5.2 mmol/L    Chloride 99 98 - 107 mmol/L    CO2 25.0 22.0 - 29.0 mmol/L    Calcium 9.6 8.6 - 10.5 mg/dL    Total Protein 6.6 6.0 - 8.5 g/dL    Albumin 3.80 3.50 - 5.20 g/dL    ALT (SGPT) 10 1 - 33 U/L    AST (SGOT) 23 1 - 32 U/L    Alkaline Phosphatase 50 39 - 117 U/L    Total Bilirubin <0.2 (L) 0.2 - 1.2 mg/dL    eGFR Non African Amer 59 (L) >60 mL/min/1.73    Globulin 2.8 gm/dL    A/G Ratio 1.4 g/dL    BUN/Creatinine Ratio 23.1 7.0 - 25.0    Anion Gap 13.0 5.0 - 15.0 mmol/L   TSH    Collection Time: 08/09/19  9:23 PM    Result Value Ref Range    TSH 4.630 (H) 0.270 - 4.200 mIU/mL   T4, Free    Collection Time: 08/09/19  9:23 PM   Result Value Ref Range    Free T4 1.20 0.93 - 1.70 ng/dL   Magnesium    Collection Time: 08/09/19  9:23 PM   Result Value Ref Range    Magnesium 2.2 1.6 - 2.4 mg/dL   Protime-INR    Collection Time: 08/09/19 10:08 PM   Result Value Ref Range    Protime 13.4 11.2 - 14.3 Seconds    INR 1.07 0.85 - 1.16   Troponin    Collection Time: 08/10/19  2:59 AM   Result Value Ref Range    Troponin T <0.010 0.000 - 0.030 ng/mL   Basic Metabolic Panel    Collection Time: 08/10/19  2:59 AM   Result Value Ref Range    Glucose 109 (H) 65 - 99 mg/dL    BUN 17 8 - 23 mg/dL    Creatinine 0.86 0.57 - 1.00 mg/dL    Sodium 140 136 - 145 mmol/L    Potassium 3.9 3.5 - 5.2 mmol/L    Chloride 102 98 - 107 mmol/L    CO2 28.0 22.0 - 29.0 mmol/L    Calcium 9.2 8.6 - 10.5 mg/dL    eGFR Non African Amer 63 >60 mL/min/1.73    BUN/Creatinine Ratio 19.8 7.0 - 25.0    Anion Gap 10.0 5.0 - 15.0 mmol/L   CBC (No Diff)    Collection Time: 08/10/19  2:59 AM   Result Value Ref Range    WBC 6.65 3.40 - 10.80 10*3/mm3    RBC 4.55 3.77 - 5.28 10*6/mm3    Hemoglobin 11.1 (L) 12.0 - 15.9 g/dL    Hematocrit 36.1 34.0 - 46.6 %    MCV 79.3 79.0 - 97.0 fL    MCH 24.4 (L) 26.6 - 33.0 pg    MCHC 30.7 (L) 31.5 - 35.7 g/dL    RDW 18.3 (H) 12.3 - 15.4 %    RDW-SD 52.6 37.0 - 54.0 fl    MPV 9.9 6.0 - 12.0 fL    Platelets 239 140 - 450 10*3/mm3     Note: In addition to lab results from this visit, the labs listed above may include labs taken at another facility or during a different encounter within the last 24 hours. Please correlate lab times with ED admission and discharge times for further clarification of the services performed during this visit.    CT Facial Bones Without Contrast   Final Result   No acute abnormality.      Signer Name: Nasir Valero MD    Signed: 8/9/2019 10:16 PM    Workstation Name: [email or web address]     [facility]      CT Head Without Contrast   Final Result   Normal, negative unenhanced head CT.      Signer Name: Nasir Valero MD    Signed: 8/9/2019 10:13 PM    Workstation Name: [email or web address]     [facility]      CT Angiogram Chest With & Without Contrast    (Results Pending)     Vitals:    08/10/19 0101 08/10/19 0223 08/10/19 0227 08/10/19 0230   BP:  (!) 196/73 179/81 156/84   Pulse: 61  65 65   Resp:       Temp:       TempSrc:       SpO2: 93% 94% 93% 93%   Weight:       Height:         Medications   niCARdipine (CARDENE-IV) 20 mg/200 mL (0.1 mg/mL) in 0.9% NaCl infusion (0 mg/hr Intravenous Hold 8/9/19 2340)   escitalopram (LEXAPRO) tablet 20 mg (not administered)   levothyroxine (SYNTHROID, LEVOTHROID) tablet 50 mcg (not administered)   valsartan (DIOVAN) tablet 80 mg (not administered)   pantoprazole (PROTONIX) EC tablet 40 mg (not administered)   sodium chloride 0.9 % flush 3 mL ( Intravenous Canceled Entry 8/10/19 0240)   sodium chloride 0.9 % flush 3-10 mL (not administered)   acetaminophen (TYLENOL) tablet 650 mg (650 mg Oral Given 8/10/19 0238)   melatonin tablet 5 mg (not administered)   ondansetron (ZOFRAN) injection 4 mg (not administered)   Tdap (BOOSTRIX) injection 0.5 mL (0.5 mL Intramuscular Given 8/9/19 2212)   sodium chloride 0.9 % bolus 500 mL (0 mL Intravenous Stopped 8/9/19 2244)     ECG/EMG Results (last 24 hours)     Procedure Component Value Units Date/Time    ECG 12 Lead [[id number]] Collected:  08/09/19 2100     Updated:  08/09/19 2102        ECG 12 Lead         ECG 12 Lead    (Results Pending)             MDM    Final diagnoses:   Hypertensive emergency   Minor head injury, initial encounter   Confusion   Abrasion of face, initial encounter       Documentation assistance provided by lucia Bunch.  Information recorded by the scribe was done at my direction and has been verified and validated by me.     Agnes Bunch  08/09/19 2132        Agnes Bunch  08/10/19 0353       Isma Callahan MD  08/10/19 0454

## 2019-08-10 NOTE — THERAPY EVALUATION
Patient Name: Anitra Dee  : 1935    MRN: [id number]                              Today's Date: 8/10/2019       Admit Date: 2019    Visit Dx:     ICD-10-CM ICD-9-CM   1. Hypertensive emergency I16.1 401.9   2. Minor head injury, initial encounter S09.90XA 959.01   3. Confusion R41.0 298.9   4. Abrasion of face, initial encounter S00.81XA 910.0   5. Impaired mobility and ADLs Z74.09 799.89   6. Impaired functional mobility, balance, gait, and endurance Z74.09 V49.89     Patient Active Problem List   Diagnosis   • Post menopausal syndrome   • Pneumonia   • Hypothyroid   • Hypertension   • GERD (gastroesophageal reflux disease)   • Asthma   • Arthritis   • Syncope   • Accelerated hypertension   • Microcytic anemia     Past Medical History:   Diagnosis Date   • Arthritis    • Asthma    • GERD (gastroesophageal reflux disease)    • Hypertension    • Hypothyroid    • Pneumonia    • Post menopausal syndrome      Past Surgical History:   Procedure Laterality Date   • HYSTERECTOMY     • KNEE ARTHROPLASTY     • REPAIR PELVIC FLOOR DEFECT VAGINAL APPROACH W/ MESH     • SHOULDER ARTHROTOMY       General Information     Row Name 08/10/19 1452 08/10/19 1445       PT Evaluation Time/Intention    Subjective Information  --  no complaints  -TA    Document Type  evaluation  -SJ  evaluation  -TA    Mode of Treatment  physical therapy  -SJ  occupational therapy  -TA    Patient Effort  --  excellent  -TA    Symptoms Noted During/After Treatment  --  none  -TA    Row Name 08/10/19 1452 08/10/19 1445       General Information    Patient Profile Reviewed?  yes  -SJ  yes  -TA    Onset of Illness/Injury or Date of Surgery  --  19  -TA    Referring Physician  --  Dr Pinzon  -TA    Patient Observations  --  alert;cooperative;agree to therapy  -TA    Patient/Family Observations  --  DTR present in room  -TA    General Observations of Patient  --  Pt supine, RA, tele, external catheter  -TA    Prior Level of Function   independent:  -SJ  independent:;all household mobility;community mobility;gait;transfer;bed mobility;ADL's;cooking  -TA    Equipment Currently Used at Home  --  none Has transport WC  -TA    Pertinent History of Current Functional Problem  --  Pt to ED after syncopal episode at home with LOC, laceration over L eye  -TA    Existing Precautions/Restrictions  fall Pt has urinary incontinence  -SJ  fall  -TA    Limitations/Impairments  --  safety/cognitive  -TA    Risks Reviewed  --  patient:;family:;LOB;nausea/vomiting;dizziness;increased discomfort;change in vital signs  -TA    Benefits Reviewed  --  patient and family:;improve function;increase independence;increase strength;increase balance;increase knowledge  -TA    Barriers to Rehab  none identified  -SJ  none identified  -TA    Row Name 08/10/19 1452 08/10/19 1445       Relationship/Environment    Primary Source of Support/Comfort  --  child(anuradha)  -TA    Lives With  child(anuradha), adult  -SJ  child(anuradha), adult  -TA    Row Name 08/10/19 1452 08/10/19 1445       Resource/Environmental Concerns    Current Living Arrangements  home/apartment/condo  -SJ  home/apartment/condo  -TA    Resource/Environmental Concerns  --  none  -TA    Row Name 08/10/19 1452 08/10/19 1445       Home Main Entrance    Number of Stairs, Main Entrance  none  -SJ  none  -TA    Stair Railings, Main Entrance  none  -SJ  --    Row Name 08/10/19 1452 08/10/19 1445       Cognitive Assessment/Intervention- PT/OT    Orientation Status (Cognition)  oriented x 4  -SJ  oriented x 4  -TA    Follows Commands (Cognition)  --  WFL  -TA    Safety Deficit (Cognitive)  --  mild deficit;insight into deficits/self awareness;safety precautions awareness;safety precautions follow-through/compliance  -TA    Row Name 08/10/19 1452 08/10/19 1445       Safety Issues, Functional Mobility    Safety Issues Affecting Function (Mobility)  sequencing abilities  -SJ  insight into deficits/self awareness;safety precaution  awareness;safety precautions follow-through/compliance  -TA    Impairments Affecting Function (Mobility)  balance;endurance/activity tolerance  -SJ  balance;endurance/activity tolerance  -TA      User Key  (r) = Recorded By, (t) = Taken By, (c) = Cosigned By    Initials Name Provider Type    Alecia Hu PT Physical Therapist    Nasir Enriquez OT Occupational Therapist        Mobility     Row Name 08/10/19 1524 08/10/19 1445       Bed Mobility Assessment/Treatment    Bed Mobility Assessment/Treatment  supine-sit  -SJ  supine-sit  -TA    Supine-Sit [place] (Bed Mobility)  set up  -SJ  contact guard  -TA    Bed Mobility, Safety Issues  --  decreased use of legs for bridging/pushing;decreased use of arms for pushing/pulling  -TA    Assistive Device (Bed Mobility)  head of bed elevated;bed rails  -SJ  head of bed elevated  -TA    Comment (Bed Mobility)  extra time and effort needed  -SJ  --    Row Name 08/10/19 1524 08/10/19 1445       Transfer Assessment/Treatment    Transfer Assessment/Treatment  --  sit-stand transfer;stand-sit transfer  -TA    Comment (Transfers)  cues for sequencing  -SJ  VCs for sequencing  -TA    Stand-Sit [place] (Transfers)  --  contact guard;verbal cues  -TA    Row Name 08/10/19 1524 08/10/19 1445       Sit-Stand Transfer    Sit-Stand [place] (Transfers)  contact guard;1 person assist  -SJ  contact guard;verbal cues  -TA    Assistive Device (Sit-Stand Transfers)  walker, front-wheeled  -SJ  walker, front-wheeled  -TA    Row Name 08/10/19 1524          Gait/Stairs Assessment/Training    [place] Level (Gait)  contact guard;1 person assist  -SJ     Assistive Device (Gait)  walker, front-wheeled  -SJ     Distance in Feet (Gait)  250  -SJ     Deviations/Abnormal Patterns (Gait)  base of support, narrow;[place] decreased;gait speed decreased  -SJ     Bilateral Gait Deviations  forward flexed posture  -SJ     Comment (Gait/Stairs)  slow gait speed  -SJ       User  Key  (r) = Recorded By, (t) = Taken By, (c) = Cosigned By    Initials Name Provider Type    Alecia Hu, PT Physical Therapist    Nasir Enriquez, OT Occupational Therapist        Obj/Interventions     Row Name 08/10/19 1525 08/10/19 1445       General ROM    GENERAL ROM COMMENTS  BLE's WFL  -SJ  BUE WFL  -TA    Row Name 08/10/19 1525 08/10/19 1445       MMT (Manual Muscle Testing)    General MMT Comments  BLE's grossly 4+/5  -SJ  BUE grossly WFL  -TA    Row Name 08/10/19 1525          Static Sitting Balance    Level of [place] (Unsupported Sitting, Static Balance)  conditional independence  -SJ     Sitting Position (Unsupported Sitting, Static Balance)  sitting on edge of bed  -SJ     Row Name 08/10/19 1445          Dynamic Sitting Balance    Level of [place], Reaches Outside Midline (Sitting, Dynamic Balance)  supervision  -TA     Sitting Position, Reaches Outside Midline (Sitting, Dynamic Balance)  sitting on edge of bed  -TA     Comment, Reaches Outside Midline (Sitting, Dynamic Balance)  LBD, UBD  -TA     Row Name 08/10/19 1525 08/10/19 1445       Static Standing Balance    Level of [place] (Supported Standing, Static Balance)  contact guard assist;1 person assist  -SJ  contact guard assist  -TA    Assistive Device Utilized (Supported Standing, Static Balance)  walker, rolling  -SJ  walker, rolling  -TA    Row Name 08/10/19 1445          Sensory Assessment/Intervention    Sensory General Assessment  no sensation deficits identified;other (see comments) BUE intact  -TA       User Key  (r) = Recorded By, (t) = Taken By, (c) = Cosigned By    Initials Name Provider Type    Alecia Hu, PT Physical Therapist    Nasir Enriquez, OT Occupational Therapist        Goals/Plan     Row Name 08/10/19 1528          Bed Mobility Goal 1 (PT)    Activity/Assistive Device (Bed Mobility Goal 1, PT)  supine to sit;sit to supine/supine to sit  -SJ     [place] Level/Cues Needed (Bed  Mobility Goal 1, PT)  conditional independence  -SJ     Time Frame (Bed Mobility Goal 1, PT)  long term goal (LTG);2 weeks  -SJ     Row Name 08/10/19 1528          Transfer Goal 1 (PT)    Activity/Assistive Device (Transfer Goal 1, PT)  sit-to-stand/stand-to-sit;bed-to-chair/chair-to-bed  -SJ     [place] Level/Cues Needed (Transfer Goal 1, PT)  conditional independence  -SJ     Time Frame (Transfer Goal 1, PT)  long term goal (LTG);2 weeks  -SJ     Row Name 08/10/19 1528          Gait Training Goal 1 (PT)    Activity/Assistive Device (Gait Training Goal 1, PT)  gait (walking locomotion);walker, rolling  -SJ     [place] Level (Gait Training Goal 1, PT)  conditional independence  -SJ     Distance (Gait Goal 1, PT)  400  -SJ     Time Frame (Gait Training Goal 1, PT)  long term goal (LTG);2 weeks  -SJ       User Key  (r) = Recorded By, (t) = Taken By, (c) = Cosigned By    Initials Name Provider Type    Alecia Hu, PT Physical Therapist        Clinical Impression     Row Name 08/10/19 1526          Pain Assessment    Additional Documentation  Pain Scale: Numbers Pre/Post-Treatment (Group)  -SJ     Row Name 08/10/19 1526 08/10/19 1445       Pain Scale: Numbers Pre/Post-Treatment    Pain Scale: Numbers, Pretreatment  0/10 - no pain  -SJ  0/10 - no pain  -TA    Pain Scale: Numbers, Post-Treatment  0/10 - no pain  -SJ  0/10 - no pain  -TA    Pre/Post Treatment Pain Comment  --  pt denied pain, tolerated  -TA    Pain Intervention(s)  --  Repositioned;Ambulation/increased activity  -TA    Row Name 08/10/19 1529 08/10/19 1526       Plan of Care Review    Plan of Care Reviewed With  patient;daughter  -SJ  patient;daughter  -SJ    Row Name 08/10/19 1445 08/10/19 1431       Plan of Care Review    Plan of Care Reviewed With  patient;daughter  -TA  patient  -LM    Row Name 08/10/19 0820 08/10/19 0618       Plan of Care Review    Plan of Care Reviewed With  patient  -LM  patient  -SV    Row Name 08/10/19 1526           Physical Therapy Clinical Impression    Patient/Family Goals Statement (PT Clinical Impression)  return to home  -     Criteria for Skilled Interventions Met (PT Clinical Impression)  yes;treatment indicated  -     Rehab Potential (PT Clinical Summary)  good, to achieve stated therapy goals  -     Predicted Duration of Therapy (PT)  2wks  -SJ     Row Name 08/10/19 1526 08/10/19 1445       Vital Signs    Pre Systolic BP Rehab  178  -SJ  178  -TA    Pre Treatment Diastolic BP  71  -SJ  71  -TA    Intra Systolic BP Rehab  135  -SJ  135  -TA    Intra Treatment Diastolic BP  124  -SJ  124  -TA    Post Systolic BP Rehab  168  -SJ  168  -TA    Post Treatment Diastolic BP  73  -SJ  73  -TA    Pretreatment Heart Rate (beats/min)  64  -SJ  --    Posttreatment Heart Rate (beats/min)  64  -SJ  --    O2 Delivery Pre Treatment  room air  -SJ  room air  -TA    O2 Delivery Intra Treatment  room air  -SJ  --    O2 Delivery Post Treatment  room air  -SJ  room air  -TA    Pre Patient Position  --  Supine  -TA    Intra Patient Position  --  Standing  -TA    Post Patient Position  --  Sitting  -TA    Row Name 08/10/19 1526 08/10/19 1445       Positioning and Restraints    Pre-Treatment Position  in bed  -SJ  in bed  -TA    Post Treatment Position  chair  -SJ  chair  -TA    In Chair  reclined;call light within reach;encouraged to call for assist;exit alarm on;with family/caregiver;heels elevated  -  reclined;call light within reach;encouraged to call for assist;exit alarm on;with family/caregiver;legs elevated  -TA      User Key  (r) = Recorded By, (t) = Taken By, (c) = Cosigned By    Initials Name Provider Type    Alecia Hu, PT Physical Therapist    Nasir Enriquez, OT Occupational Therapist    Jesus Siddiqui RN Registered Nurse    Elva Cason RN Registered Nurse        Outcome Measures     Row Name 08/10/19 1451          How much help from another person do you currently need...    Turning from  your back to your side while in flat bed without using bedrails?  4  -SJ     Moving from lying on back to sitting on the side of a flat bed without bedrails?  4  -SJ     Moving to and from a bed to a chair (including a wheelchair)?  3  -SJ     Standing up from a chair using your arms (e.g., wheelchair, bedside chair)?  3  -SJ     Climbing 3-5 steps with a railing?  3  -SJ     To walk in hospital room?  3  -SJ     AM-PAC 6 Clicks Score (PT)  20  -SJ     Row Name 08/10/19 1452          Functional Assessment    Outcome Measure Options  AM-PAC 6 Clicks Basic Mobility (PT)  -       User Key  (r) = Recorded By, (t) = Taken By, (c) = Cosigned By    Initials Name Provider Type    Alecia Hu PT Physical Therapist        Physical Therapy Education     Title: PT OT SLP Therapies (In Progress)     Topic: Physical Therapy (Done)     Point: Mobility training (Done)     Learning Progress Summary           Patient Acceptance, E,TB, VU by  at 8/10/2019  3:30 PM   Family Acceptance, E,TB, VU by  at 8/10/2019  3:30 PM                   Point: Home exercise program (Done)     Learning Progress Summary           Patient Acceptance, E,TB, VU by  at 8/10/2019  3:30 PM   Family Acceptance, E,TB, VU by  at 8/10/2019  3:30 PM                   Point: Body mechanics (Done)     Learning Progress Summary           Patient Acceptance, E,TB, VU by  at 8/10/2019  3:30 PM   Family Acceptance, E,TB, VU by  at 8/10/2019  3:30 PM                               User Key     Initials Effective Dates Name Provider Type [facility] 06/19/15 -  Alecia Edgar, NIESHA Physical Therapist PT              PT Recommendation and Plan  Planned Therapy Interventions (PT Eval): balance training, bed mobility training, gait training, home exercise program, patient/family education, strengthening, transfer training  Outcome Summary/Treatment Plan (PT)  Anticipated Equipment Needs at Discharge (PT): front wheeled walker  Anticipated Discharge  Disposition (PT): home with home health  Plan of Care Reviewed With: patient, daughter  Outcome Summary: PT eval completed. Pt presents with impaired balance and difficulty walking. Pt t/f's wiht CGA, ambulated in hallway with RW with CGA. PT recommends d/c home with HHPT.     Time Calculation:   PT Charges     Row Name 08/10/19 1452             Time Calculation    Start Time  1452  -SJ      PT Received On  08/10/19  -      PT Goal Re-Cert Due Date  08/20/19  -        User Key  (r) = Recorded By, (t) = Taken By, (c) = Cosigned By    Initials Name Provider Type     Alecia Edgar, PT Physical Therapist        Therapy Charges for Today     Code Description Service Date Service Provider Modifiers Qty    [id number] HC PT EVAL MOD COMPLEXITY 3 8/10/2019 Alecia Edgar PT GP 1          PT G-Codes  Outcome Measure Options: AM-PAC 6 Clicks Basic Mobility (PT)  AM-PAC 6 Clicks Score (PT): 20    Alecia Edgar PT  8/10/2019

## 2019-08-10 NOTE — THERAPY EVALUATION
Acute Care - Occupational Therapy Initial Evaluation  [facility]     Patient Name: Anitra Dee  : 1935  MRN: [id number]  Today's Date: 8/10/2019  Onset of Illness/Injury or Date of Surgery: 19  Date of Referral to OT: 19  Referring Physician: Dr Pinzon    Admit Date: 2019       ICD-10-CM ICD-9-CM   1. Hypertensive emergency I16.1 401.9   2. Minor head injury, initial encounter S09.90XA 959.01   3. Confusion R41.0 298.9   4. Abrasion of face, initial encounter S00.81XA 910.0   5. Impaired mobility and ADLs Z74.09 799.89   6. Impaired functional mobility, balance, gait, and endurance Z74.09 V49.89     Patient Active Problem List   Diagnosis   • Post menopausal syndrome   • Pneumonia   • Hypothyroid   • Hypertension   • GERD (gastroesophageal reflux disease)   • Asthma   • Arthritis   • Syncope   • Accelerated hypertension   • Microcytic anemia     Past Medical History:   Diagnosis Date   • Arthritis    • Asthma    • GERD (gastroesophageal reflux disease)    • Hypertension    • Hypothyroid    • Pneumonia    • Post menopausal syndrome      Past Surgical History:   Procedure Laterality Date   • HYSTERECTOMY     • KNEE ARTHROPLASTY     • REPAIR PELVIC FLOOR DEFECT VAGINAL APPROACH W/ MESH     • SHOULDER ARTHROTOMY            OT ASSESSMENT FLOWSHEET (last 12 hours)      Occupational Therapy Evaluation     Row Name 08/10/19 1445                   OT Evaluation Time/Intention    Subjective Information  no complaints  -TA        Document Type  evaluation  -TA        Mode of Treatment  occupational therapy  -TA        Patient Effort  excellent  -TA        Symptoms Noted During/After Treatment  none  -TA           General Information    Patient Profile Reviewed?  yes  -TA        Onset of Illness/Injury or Date of Surgery  19  -TA        Referring Physician  Dr Pinzon  -TA        Patient Observations  alert;cooperative;agree to therapy  -TA        Patient/Family Observations  DTR present in  room  -TA        General Observations of Patient  Pt supine, RA, tele, external catheter  -TA        Prior Level of Function  independent:;all household mobility;community mobility;gait;transfer;bed mobility;ADL's;cooking  -TA        Equipment Currently Used at Home  none Has transport WC  -TA        Pertinent History of Current Functional Problem  Pt to ED after syncopal episode at home with LOC, laceration over L eye  -TA        Existing Precautions/Restrictions  fall  -TA        Limitations/Impairments  safety/cognitive  -TA        Risks Reviewed  patient:;family:;LOB;nausea/vomiting;dizziness;increased discomfort;change in vital signs  -TA        Benefits Reviewed  patient and family:;improve function;increase independence;increase strength;increase balance;increase knowledge  -TA        Barriers to Rehab  none identified  -TA           Relationship/Environment    Primary Source of Support/Comfort  child(anuradha)  -TA        Lives With  child(anuradha), adult  -TA           Resource/Environmental Concerns    Current Living Arrangements  home/apartment/condo  -TA        Resource/Environmental Concerns  none  -TA           Home Main Entrance    Number of Stairs, Main Entrance  none  -TA           Cognitive Assessment/Intervention- PT/OT    Orientation Status (Cognition)  oriented x 4  -TA        Follows Commands (Cognition)  WFL  -TA        Safety Deficit (Cognitive)  mild deficit;insight into deficits/self awareness;safety precautions awareness;safety precautions follow-through/compliance  -TA           Safety Issues, Functional Mobility    Safety Issues Affecting Function (Mobility)  insight into deficits/self awareness;safety precaution awareness;safety precautions follow-through/compliance  -TA        Impairments Affecting Function (Mobility)  balance;endurance/activity tolerance  -TA           Bed Mobility Assessment/Treatment    Bed Mobility Assessment/Treatment  supine-sit  -TA        Supine-Sit [place] (Bed  Mobility)  contact guard  -TA        Bed Mobility, Safety Issues  decreased use of legs for bridging/pushing;decreased use of arms for pushing/pulling  -TA        Assistive Device (Bed Mobility)  head of bed elevated  -TA           Functional Mobility    Functional Mobility- Ind. Level  contact guard assist  -TA        Functional Mobility- Device  rolling walker  -TA        Functional Mobility-Distance (Feet)  -- In room, in hallway  -TA        Functional Mobility- Safety Issues  balance decreased during turns;step length decreased  -TA           Transfer Assessment/Treatment    Transfer Assessment/Treatment  sit-stand transfer;stand-sit transfer  -TA        Comment (Transfers)  VCs for sequencing  -TA           Sit-Stand Transfer    Sit-Stand [place] (Transfers)  contact guard;verbal cues  -TA        Assistive Device (Sit-Stand Transfers)  walker, front-wheeled  -TA           Stand-Sit Transfer    Stand-Sit [place] (Transfers)  contact guard;verbal cues  -TA        Assistive Device (Stand-Sit Transfers)  walker, front-wheeled  -TA           ADL Assessment/Intervention    BADL Assessment/Intervention  upper body dressing;lower body dressing  -TA           Upper Body Dressing Assessment/Training    Upper Body Dressing [place] Level  don;front opening garment;set up;supervision  -TA        Upper Body Dressing Position  edge of bed sitting  -TA           Lower Body Dressing Assessment/Training    Lower Body Dressing [place] Level  doff;don;socks;minimum assist (75% patient effort)  -TA        Lower Body Dressing Position  edge of bed sitting  -TA        Comment (Lower Body Dressing)  cross leg technique  -TA           BADL Safety/Performance    Impairments, BADL Safety/Performance  balance;endurance/activity tolerance  -TA        Skilled BADL Treatment/Intervention  BADL process/adaptation training  -TA           General ROM    GENERAL ROM COMMENTS  BUE WFL  -TA           MMT (Manual Muscle Testing)     General MMT Comments  BUE grossly WFL  -TA           Motor Assessment/Interventions    Additional Documentation  Balance (Group);Balance Interventions (Group)  -TA           Balance    Balance  dynamic sitting balance;dynamic standing balance  -TA           Dynamic Sitting Balance    Level of [place], Reaches Outside Midline (Sitting, Dynamic Balance)  supervision  -TA        Sitting Position, Reaches Outside Midline (Sitting, Dynamic Balance)  sitting on edge of bed  -TA        Comment, Reaches Outside Midline (Sitting, Dynamic Balance)  LBD, UBD  -TA           Static Standing Balance    Level of [place] (Supported Standing, Static Balance)  contact guard assist  -TA        Assistive Device Utilized (Supported Standing, Static Balance)  walker, rolling  -TA           Sensory Assessment/Intervention    Sensory General Assessment  no sensation deficits identified;other (see comments) BUE intact  -TA           Positioning and Restraints    Pre-Treatment Position  in bed  -TA        Post Treatment Position  chair  -TA        In Chair  reclined;call light within reach;encouraged to call for assist;exit alarm on;with family/caregiver;legs elevated  -TA           Pain Assessment    Additional Documentation  Pain Scale: Numbers Pre/Post-Treatment (Group)  -TA           Pain Scale: Numbers Pre/Post-Treatment    Pain Scale: Numbers, Pretreatment  0/10 - no pain  -TA        Pain Scale: Numbers, Post-Treatment  0/10 - no pain  -TA        Pre/Post Treatment Pain Comment  pt denied pain, tolerated  -TA        Pain Intervention(s)  Repositioned;Ambulation/increased activity  -TA           Coping    Observed Emotional State  accepting;cooperative  -TA        Verbalized Emotional State  acceptance  -TA           Plan of Care Review    Plan of Care Reviewed With  patient;daughter  -TA           Clinical Impression (OT)    Date of Referral to OT  08/09/19  -TA        OT Diagnosis  Impaired mobility and ADLs  -TA         Functional Level at Time of Evaluation (OT Eval)  fxl decline from PLOF  -TA        Patient/Family Goals Statement (OT Eval)  Return home  -TA        Criteria for Skilled Therapeutic Interventions Met (OT Eval)  yes;treatment indicated  -TA        Rehab Potential (OT Eval)  good, to achieve stated therapy goals  -TA        Therapy Frequency (OT Eval)  daily  -TA        Care Plan Review (OT)  evaluation/treatment results reviewed;risks/benefits reviewed;patient/other agree to care plan  -TA        Care Plan Review, Other Participant (OT Eval)  daughter  -TA        Anticipated Discharge Disposition (OT)  home with home health;home with assist  -TA           Vital Signs    Pre Systolic BP Rehab  178  -TA        Pre Treatment Diastolic BP  71  -TA        Intra Systolic BP Rehab  135  -TA        Intra Treatment Diastolic BP  124  -TA        Post Systolic BP Rehab  168  -TA        Post Treatment Diastolic BP  73  -TA        O2 Delivery Pre Treatment  room air  -TA        O2 Delivery Post Treatment  room air  -TA        Pre Patient Position  Supine  -TA        Intra Patient Position  Standing  -TA        Post Patient Position  Sitting  -TA           Planned OT Interventions    Planned Therapy Interventions (OT Eval)  activity tolerance training;BADL retraining;functional balance retraining;occupation/activity based interventions;ROM/therapeutic exercise;strengthening exercise;transfer/mobility retraining  -TA           OT Goals    Transfer Goal Selection (OT)  transfer, OT goal 1  -TA        Dressing Goal Selection (OT)  dressing, OT goal 1  -TA        Toileting Goal Selection (OT)  toileting, OT goal 1  -TA           Transfer Goal 1 (OT)    Activity/Assistive Device (Transfer Goal 1, OT)  sit-to-stand/stand-to-sit;bed-to-chair/chair-to-bed;toilet  -TA        [place] Level/Cues Needed (Transfer Goal 1, OT)  conditional independence  -TA        Time Frame (Transfer Goal 1, OT)  by discharge  -TA        Progress/Outcome  (Transfer Goal 1, OT)  goal ongoing  -TA           Dressing Goal 1 (OT)    Activity/Assistive Device (Dressing Goal 1, OT)  lower body dressing  -TA        [place]/Cues Needed (Dressing Goal 1, OT)  conditional independence  -TA        Time Frame (Dressing Goal 1, OT)  by discharge  -TA        Progress/Outcome (Dressing Goal 1, OT)  goal ongoing  -TA           Toileting Goal 1 (OT)    Activity/Device (Toileting Goal 1, OT)  toileting skills, all  -TA        [place] Level/Cues Needed (Toileting Goal 1, OT)  conditional independence  -TA        Time Frame (Toileting Goal 1, OT)  by discharge  -TA        Progress/Outcome (Toileting Goal 1, OT)  goal ongoing  -TA          User Key  (r) = Recorded By, (t) = Taken By, (c) = Cosigned By    Initials Name Effective Dates    Nasir Enriquez OT 03/14/16 -          Occupational Therapy Education     Title: PT OT SLP Therapies (In Progress)     Topic: Occupational Therapy (In Progress)     Point: ADL training (Done)     Description: Instruct learner(s) on proper safety adaptation and remediation techniques during self care or transfers.   Instruct in proper use of assistive devices.    Learning Progress Summary           Patient Acceptance, E,D, VU by TA at 8/10/2019  3:32 PM    Comment:  Safety with ADLs; body mechanics with bed mobility; reinforced need for call for assist with OOB activities.   Family Acceptance, E,D, VU by TA at 8/10/2019  3:32 PM    Comment:  Safety with ADLs; body mechanics with bed mobility; reinforced need for call for assist with OOB activities.                   Point: Precautions (Done)     Description: Instruct learner(s) on prescribed precautions during self-care and functional transfers.    Learning Progress Summary           Patient Acceptance, E,D, VU by TA at 8/10/2019  3:32 PM    Comment:  Safety with ADLs; body mechanics with bed mobility; reinforced need for call for assist with OOB activities.   Family Acceptance, E,D, VU  by TA at 8/10/2019  3:32 PM    Comment:  Safety with ADLs; body mechanics with bed mobility; reinforced need for call for assist with OOB activities.                   Point: Body mechanics (Done)     Description: Instruct learner(s) on proper positioning and spine alignment during self-care, functional mobility activities and/or exercises.    Learning Progress Summary           Patient Acceptance, E,D, VU by TA at 8/10/2019  3:32 PM    Comment:  Safety with ADLs; body mechanics with bed mobility; reinforced need for call for assist with OOB activities.   Family Acceptance, E,D, VU by TA at 8/10/2019  3:32 PM    Comment:  Safety with ADLs; body mechanics with bed mobility; reinforced need for call for assist with OOB activities.                               User Key     Initials Effective Dates Name Provider Type Discipline    JOHNATHON 03/14/16 -  Nasir Chappell, OT Occupational Therapist OT                  OT Recommendation and Plan  Outcome Summary/Treatment Plan (OT)  Anticipated Discharge Disposition (OT): home with home health, home with assist  Planned Therapy Interventions (OT Eval): activity tolerance training, BADL retraining, functional balance retraining, occupation/activity based interventions, ROM/therapeutic exercise, strengthening exercise, transfer/mobility retraining  Therapy Frequency (OT Eval): daily  Plan of Care Review  Plan of Care Reviewed With: patient, daughter  Plan of Care Reviewed With: patient, daughter  Outcome Summary: Pt presents with fxl decline from PLOF, deficits in ADL performance, fxl mobility and occupational endurance. Will benefit from skilled OT services to address deficits, facilitate increased fxl I. Recommend home with assist, HHOT.     Outcome Measures     Row Name 08/10/19 1452 08/10/19 1445          How much help from another person do you currently need...    Turning from your back to your side while in flat bed without using bedrails?  4  -SJ  --     Moving from  lying on back to sitting on the side of a flat bed without bedrails?  4  -SJ  --     Moving to and from a bed to a chair (including a wheelchair)?  3  -SJ  --     Standing up from a chair using your arms (e.g., wheelchair, bedside chair)?  3  -SJ  --     Climbing 3-5 steps with a railing?  3  -SJ  --     To walk in hospital room?  3  -SJ  --     AM-PAC 6 Clicks Score (PT)  20  -SJ  --        How much help from another is currently needed...    Putting on and taking off regular lower body clothing?  --  3  -TA     Bathing (including washing, rinsing, and drying)  --  3  -TA     Toileting (which includes using toilet bed pan or urinal)  --  3  -TA     Putting on and taking off regular upper body clothing  --  4  -TA     Taking care of personal grooming (such as brushing teeth)  --  4  -TA     Eating meals  --  4  -TA     AM-PAC 6 Clicks Score (OT)  --  21  -TA        Functional Assessment    Outcome Measure Options  AM-PAC 6 Clicks Basic Mobility (PT)  -SJ  AM-PAC 6 Clicks Daily Activity (OT)  -TA       User Key  (r) = Recorded By, (t) = Taken By, (c) = Cosigned By    Initials Name Provider Type     Alecia Edgar, PT Physical Therapist    Nasir Enriquez OT Occupational Therapist          Time Calculation:   Time Calculation- OT     Row Name 08/10/19 1535             Time Calculation- OT    OT Start Time  1445 ttc 0 minutes  -TA      Total Timed Code Minutes- OT  0 minute(s)  -TA      OT Received On  08/10/19  -TA      OT Goal Re-Cert Due Date  08/20/19  -TA        User Key  (r) = Recorded By, (t) = Taken By, (c) = Cosigned By    Initials Name Provider Type    Nasir Enriquez, OT Occupational Therapist        Therapy Charges for Today     Code Description Service Date Service Provider Modifiers Qty    [id number]  OT EVAL MOD COMPLEXITY 4 8/10/2019 Nasir Chappell OT GO 1               Nasir Chappell OT  8/10/2019

## 2019-08-10 NOTE — PROGRESS NOTES
[facility]  INPATIENT PROGRESS NOTE    Date of Admission: 8/9/2019  Length of Stay: 0  Primary Care Physician: Eleuterio Myers MD    Subjective     Chief Complaint: Syncope with urination    HPI:  Patient feels much better.  She still has no recollection of the events yesterday.  Today has no complaints other than right knee pain.  Cardene drip is off    Review Of Systems:   Patient denies headaches, fever, chills, shortness of breath, chest pain, cough, abdominal pain, nausea or vomiting, diarrhea, rash, itching or bleeding      Objective      Vitals:   97.8 °F (36.6 °C) Temp  Min: 97.8 °F (36.6 °C)  Max: 98 °F (36.7 °C)    178/70 BP  Min: 145/63  Max: 230/91    63 Pulse  Min: 59  Max: 70    16 Resp  Min: 12  Max: 18    95 % SpO2  Min: 91 %  Max: 96 %    room air       No Data Recorded     Patient is alert and talkative in no distress at rest--laceration and bruise over her left eye  Neck is without mass or JVD no bruits appreciated  Heart is Reg wo murmur  Lungs are clear wo wheeze or crackle  Abd is soft without HSM or mass, not distended or tender to palpation  MAEW, chronic osteoarthritic changes-left thumb is bruised  Skin is without rash  Neurologic exam is nonfocal   Mood is appropriate      Results Review:    I have reviewed the labs, radiology results and diagnostic studies.    Results from last 7 days   Lab Units 08/10/19  0259 08/09/19 2208 08/09/19  2123   WBC 10*3/mm3 6.65  --  6.47   HEMOGLOBIN g/dL 11.1*  --  11.1*   HEMATOCRIT % 36.1  --  35.4   PLATELETS 10*3/mm3 239  --  236   INR   --  1.07  --      Results from last 7 days   Lab Units 08/10/19  0259 08/09/19  2123   SODIUM mmol/L 140 137   POTASSIUM mmol/L 3.9 5.0   CHLORIDE mmol/L 102 99   CO2 mmol/L 28.0 25.0   BUN mg/dL 17 21   CREATININE mg/dL 0.86 0.91   GLUCOSE mg/dL 109* 107*   CALCIUM mg/dL 9.2 9.6   ALT (SGPT) U/L  --  10   AST (SGOT) U/L  --  23       Microbiology Results Abnormal      None        Ct Head Without Contrast    Result Date: 8/9/2019  Normal, negative unenhanced head CT. Signer Name: Nasir Valero MD  Signed: 8/9/2019 10:13 PM  Workstation Name: [facility]  Radiology [facility]    Ct Angiogram Chest With & Without Contrast    Result Date: 8/10/2019  1. Negative for pulmonary embolism. 2. No acute findings in the chest. 3. Cholelithiasis. 4. Question slight peripancreatic fat stranding, might indicate pancreatitis, though findings not definite. Recommend clinical correlation. Signer Name: Nasir Valero MD  Signed: 8/10/2019 5:25 AM  Workstation Name: [facility]    Ct Facial Bones Without Contrast    Result Date: 8/9/2019  No acute abnormality. Signer Name: Nasir Valero MD  Signed: 8/9/2019 10:16 PM  Workstation Name: [facility]  Radiology [facility]         I have reviewed the medications.    Assessment/Plan     Assessment/Problem List    Syncope    Hypothyroid    GERD (gastroesophageal reflux disease)    Asthma    Arthritis    Accelerated hypertension    Microcytic anemia      Plan  Very healthy 83-year-old woman with history of hypertension, hypothyroidism, osteoarthritis who had a teresa syncopal event with amnesia--with accelerated hypertension  Blood pressure is much improved without IV medication , labetalol has been restarted.  Echocardiogram and carotids initially look benign CTA of the chest is negative.  We will check MRI of the brain today to evaluate for TIA causing transient global amnesia  We will check x-ray of her right knee due to pain and previous knee replacement.    DVT prophylaxis: Will add heparin subcu  Discharge Planning: I expect patient to be discharged to home tomorrow  Electronically signed by Eula Stevens MD, 08/10/19 3:30 PM .

## 2019-08-10 NOTE — PLAN OF CARE
Problem: Patient Care Overview  Goal: Plan of Care Review  Outcome: Ongoing (interventions implemented as appropriate)   08/10/19 3889   Coping/Psychosocial   Plan of Care Reviewed With patient;daughter   OTHER   Outcome Summary PT kike completed. Pt presents with impaired balance and difficulty walking. Pt t/f's wiht CGA, ambulated in hallway with RW with CGA. PT recommends d/c home with HHPT.

## 2019-08-11 VITALS
WEIGHT: 165 LBS | RESPIRATION RATE: 16 BRPM | DIASTOLIC BLOOD PRESSURE: 77 MMHG | HEART RATE: 62 BPM | BODY MASS INDEX: 31.15 KG/M2 | SYSTOLIC BLOOD PRESSURE: 198 MMHG | TEMPERATURE: 97.6 F | HEIGHT: 61 IN | OXYGEN SATURATION: 91 %

## 2019-08-11 PROBLEM — R55 SYNCOPE: Status: RESOLVED | Noted: 2019-08-10 | Resolved: 2019-08-11

## 2019-08-11 PROCEDURE — G0378 HOSPITAL OBSERVATION PER HR: HCPCS

## 2019-08-11 PROCEDURE — 96372 THER/PROPH/DIAG INJ SC/IM: CPT

## 2019-08-11 PROCEDURE — 99217 PR OBSERVATION CARE DISCHARGE MANAGEMENT: CPT | Performed by: INTERNAL MEDICINE

## 2019-08-11 PROCEDURE — 25010000002 HEPARIN (PORCINE) PER 1000 UNITS: Performed by: INTERNAL MEDICINE

## 2019-08-11 RX ORDER — ISOSORBIDE MONONITRATE 30 MG/1
30 TABLET, EXTENDED RELEASE ORAL DAILY
Qty: 30 TABLET | Refills: 1 | Status: SHIPPED | OUTPATIENT
Start: 2019-08-11 | End: 2021-02-04

## 2019-08-11 RX ORDER — VALSARTAN 80 MG/1
80 TABLET ORAL ONCE
Status: COMPLETED | OUTPATIENT
Start: 2019-08-11 | End: 2019-08-11

## 2019-08-11 RX ADMIN — ESCITALOPRAM OXALATE 20 MG: 20 TABLET ORAL at 09:07

## 2019-08-11 RX ADMIN — HEPARIN SODIUM 5000 UNITS: 5000 INJECTION INTRAVENOUS; SUBCUTANEOUS at 09:07

## 2019-08-11 RX ADMIN — PANTOPRAZOLE SODIUM 40 MG: 40 TABLET, DELAYED RELEASE ORAL at 09:07

## 2019-08-11 RX ADMIN — SODIUM CHLORIDE, PRESERVATIVE FREE 3 ML: 5 INJECTION INTRAVENOUS at 09:07

## 2019-08-11 RX ADMIN — LEVOTHYROXINE SODIUM 50 MCG: 50 TABLET ORAL at 05:34

## 2019-08-11 RX ADMIN — LABETALOL HYDROCHLORIDE 100 MG: 200 TABLET, FILM COATED ORAL at 05:33

## 2019-08-11 RX ADMIN — NITROGLYCERIN 0.5 INCH: 20 OINTMENT TOPICAL at 10:01

## 2019-08-11 RX ADMIN — VALSARTAN 80 MG: 80 TABLET, FILM COATED ORAL at 09:07

## 2019-08-11 RX ADMIN — VALSARTAN 80 MG: 80 TABLET, FILM COATED ORAL at 10:52

## 2019-08-11 NOTE — DISCHARGE SUMMARY
[facility] Medicine Services  DISCHARGE SUMMARY    Patient Name: Anitra Dee  : 1935  MRN: [id number]    Date of Admission: 2019  Date of Discharge: 19  Length of Stay: 0  Primary Care Physician: Eleuterio Myers MD    Consults     No orders found from 2019 to 8/10/2019.        Hospital Course     Presenting Problem:   Syncope [R55]      Active Hospital Problems    Diagnosis  POA   • Accelerated hypertension [I10]  Yes     Priority: High   • Microcytic anemia [D50.9]  Yes   • Hypothyroid [E03.9]  Yes   • GERD (gastroesophageal reflux disease) [K21.9]  Yes   • Asthma [J45.909]  Yes   • Arthritis [M19.90]  Yes      Resolved Hospital Problems    Diagnosis Date Resolved POA   • **Syncope [R55] 2019 Yes          Hospital Course:  Anitra Dee is a very healthy 83-year-old woman with history of hypertension, hypothyroidism, osteoarthritis who had a teresa syncopal event with amnesia--with accelerated hypertension with systolic blood pressure greater than 230.  Was initially placed on Cardene drip became off of it very quickly.  Medications were restarted but she persisted to have blood pressure 1  range.  She did not have any neurologic or cardiac symptoms from the elevated blood pressure.  She was able to ambulate without any difficulty throughout the hospital.  Both her labetalol and Diovan were increased.    She was also started on Imdur at discharge she did tolerate topical nitrates in the hospital.    __She had evaluation for a TIA which included an echo with an EF of 65% moderate tricuspid regurgitation right ventricular systolic pressure 51    __Carotid ultrasound showed right internal carotid stenosis of 0 to 49% proximal left normal     __MRI-showed no acute intracranial abnormality but did show atrophy and moderate chronic small vessel ischemic disease  Overall think this was a spell of hypertension emergency and overall her blood pressure  is improved she is no longer symptomatic.  Believe she has chronically elevated blood pressure in the best place to titrate this is as an outpatient situation where she is less stimulated.    Discharge Follow Up Recommendations for labs/diagnostics:  Patient needs to check her blood pressure twice a day.  She is to see Dr. Myers this week.  She needs an outpatient EEG to rule out seizure.           Day of Discharge     HPI:   Patient denies headaches, fever, chills, sore throat, shortness of breath, chest pain, cough, nausea or vomiting, diarrhea, abdominal pain or distension, joint pain, rash, itching or bleeding.  Vital Signs:   Temp:  [97.6 °F (36.4 °C)-97.9 °F (36.6 °C)] 97.6 °F (36.4 °C)  Heart Rate:  [60-68] 62  Resp:  [16] 16  BP: (169-212)/(63-88) 198/77     Physical Exam:  Patient is alert and talkative in no distress at rest  Neck is without mass or JVD  Heart is Reg wo murmur  Lungs are clear wo wheeze or crackle  Abd is soft without HSM or mass, not tender or distended  MAEW  Skin is without rash  Neurologic exam in nonfocal   Mood is appropriate  SHe was able to walk with me 100 feet in the halls without difficulty    Pertinent  and/or Most Recent Results       Results from last 7 days   Lab Units 08/10/19  0259 08/09/19  2123   WBC 10*3/mm3 6.65 6.47   HEMOGLOBIN g/dL 11.1* 11.1*   HEMATOCRIT % 36.1 35.4   PLATELETS 10*3/mm3 239 236   SODIUM mmol/L 140 137   POTASSIUM mmol/L 3.9 5.0   CHLORIDE mmol/L 102 99   CO2 mmol/L 28.0 25.0   BUN mg/dL 17 21   CREATININE mg/dL 0.86 0.91   GLUCOSE mg/dL 109* 107*   CALCIUM mg/dL 9.2 9.6     Results from last 7 days   Lab Units 08/09/19 2208 08/09/19 2123   BILIRUBIN mg/dL  --  <0.2*   ALK PHOS U/L  --  50   ALT (SGPT) U/L  --  10   AST (SGOT) U/L  --  23   PROTIME Seconds 13.4  --    INR  1.07  --       Results from last 7 days   Lab Units 08/10/19  0259   CHOLESTEROL mg/dL 184   TRIGLYCERIDES mg/dL 133   HDL CHOL mg/dL 49   LDL CHOL mg/dL 108*     Results  from last 7 days   Lab Units 08/10/19  0259 08/09/19  2123   TSH mIU/mL  --  4.630*   HEMOGLOBIN A1C % 6.10*  --    TROPONIN T ng/mL <0.010  --      Brief Urine Lab Results  (Last result in the past 365 days)      Color   Clarity   Blood   Leuk Est   Nitrite   Protein   CREAT   Urine HCG        08/09/19 2217 Yellow Clear Negative Small (1+) Negative Negative                    Ct Head Without Contrast    Result Date: 8/9/2019  Impression: Normal, negative unenhanced head CT. Signer Name: Nasir Valero MD  Signed: 8/9/2019 10:13 PM  Workstation Name: [facility]    Ct Angiogram Chest With & Without Contrast    Result Date: 8/10/2019  Impression: 1. Negative for pulmonary embolism. 2. No acute findings in the chest. 3. Cholelithiasis. 4. Question slight peripancreatic fat stranding, might indicate pancreatitis, though findings not definite. Recommend clinical correlation. Signer Name: Nasir Valero MD  Signed: 8/10/2019 5:25 AM  Workstation Name: [facility]    Mri Brain Without Contrast    Result Date: 8/11/2019  Impression: No acute intracranial abnormality specifically no acute infarction midline shift or hydrocephalus. No susceptibility artifact on susceptibility weighted imaging to suggest hemorrhagic component of involvement or hemosiderin deposition of associated blood products. Senescent changes of generalized atrophy and moderate chronic small vessel ischemic disease.        Ct Facial Bones Without Contrast    Result Date: 8/9/2019  Impression: No acute abnormality. Signer Name: Nasir Valero MD  Signed: 8/9/2019 10:16 PM  Workstation [facility]: [facility]  Radiology [facility]      Results for orders placed during the hospital encounter of 08/09/19   Transthoracic Echo Complete With Contrast if Necessary Per Protocol    Narrative · Estimated EF = 65%.  · Mild mitral valve regurgitation is present  · Moderate  tricuspid valve regurgitation is present.  · Calculated right ventricular systolic pressure from tricuspid   regurgitation is 51 mmHg.        Results for orders placed during the hospital encounter of 08/09/19   Duplex Carotid Ultrasound CAR    Narrative · Right internal carotid artery stenosis of 0-49%.  · Proximal left internal carotid artery is normal.          Discharge Details        Discharge Medications      New Medications      Instructions Start Date   aspirin 81 MG tablet   81 mg, Oral, Daily      isosorbide mononitrate 30 MG 24 hr tablet  Commonly known as:  IMDUR   30 mg, Oral, Daily         Continue These Medications      Instructions Start Date   escitalopram 20 MG tablet  Commonly known as:  LEXAPRO   20 mg, Oral, Daily      labetalol 100 MG tablet  Commonly known as:  NORMODYNE   100 mg, Oral, 2 Times Daily      levothyroxine 50 MCG tablet  Commonly known as:  SYNTHROID, LEVOTHROID   75 mcg, Oral, Daily      omeprazole 40 MG capsule  Commonly known as:  priLOSEC   40 mg, Oral, Daily      valsartan 80 MG tablet  Commonly known as:  DIOVAN   80 mg, Oral, Daily         Stop These Medications    CloNIDine 0.1 MG tablet  Commonly known as:  CATAPRES     estrogens (conjugated) 0.3 MG tablet  Commonly known as:  PREMARIN              Discharge Disposition:  Home or Self Care    Discharge Diet:    Regular  Discharge Activity:    As Tolerated  Special Instructions:    No future appointments.    Additional Instructions for the Follow-ups that You Need to Schedule     Discharge Follow-up with PCP   As directed       Currently Documented PCP:    Eleuterio Myers MD    PCP Phone Number:    [phone]     Follow Up Details:  this week               Time Spent on Discharge: 60 minutes    Electronically signed by Eula Stevens MD 08/11/19 1:44 PM

## 2019-08-11 NOTE — PLAN OF CARE
Problem: Patient Care Overview  Goal: Plan of Care Review  Outcome: Ongoing (interventions implemented as appropriate)   08/11/19 0037   Coping/Psychosocial   Plan of Care Reviewed With patient   Plan of Care Review   Progress no change   OTHER   Outcome Summary Pt. is more steady and says she is feelng better. Blood pressure has been ellevated again this evening, but is slowly showing some downward progress with medication. Otherwise VSS; will continue to monitor

## 2019-08-12 ENCOUNTER — TRANSCRIBE ORDERS (OUTPATIENT)
Dept: ADMINISTRATIVE | Facility: HOSPITAL | Age: 84
End: 2019-08-12

## 2019-08-12 DIAGNOSIS — R55 SYNCOPE AND COLLAPSE: Primary | ICD-10-CM

## 2019-08-12 NOTE — THERAPY DISCHARGE NOTE
Acute Care - Occupational Therapy Discharge Summary  [facility]     Patient Name: Anitra Dee  : 1935  MRN: [id number]    Today's Date: 2019  Onset of Illness/Injury or Date of Surgery: 19    Date of Referral to OT: 19  Referring Physician: Dr Pinzon      Admit Date: 2019        OT Recommendation and Plan    Visit Dx:    ICD-10-CM ICD-9-CM   1. Hypertensive emergency I16.1 401.9   2. Minor head injury, initial encounter S09.90XA 959.01   3. Confusion R41.0 298.9   4. Abrasion of face, initial encounter S00.81XA 910.0   5. Impaired mobility and ADLs Z74.09 799.89   6. Impaired functional mobility, balance, gait, and endurance Z74.09 V49.89   7. Heat syncope, initial encounter T67.1XXA 992.1               Rehab Goal Summary     Row Name 19 1129             Transfer Goal 1 (OT)    Progress/Outcome (Transfer Goal 1, OT)  goal not met;discharged from facility  -GABRIELLE         Dressing Goal 1 (OT)    Progress/Outcome (Dressing Goal 1, OT)  goal not met;discharged from facility  -GABRIELLE         Toileting Goal 1 (OT)    Progress/Outcome (Toileting Goal 1, OT)  goal not met;discharged from facility  -GABRIELLE        User Key  (r) = Recorded By, (t) = Taken By, (c) = Cosigned By    Initials Name Provider Type Discipline    Stella North, OT Occupational Therapist OT          Outcome Measures     Row Name 08/10/19 1452 08/10/19 1445          How much help from another person do you currently need...    Turning from your back to your side while in flat bed without using bedrails?  4  -SJ  --     Moving from lying on back to sitting on the side of a flat bed without bedrails?  4  -SJ  --     Moving to and from a bed to a chair (including a wheelchair)?  3  -SJ  --     Standing up from a chair using your arms (e.g., wheelchair, bedside chair)?  3  -SJ  --     Climbing 3-5 steps with a railing?  3  -SJ  --     To walk in hospital room?  3  -SJ  --     AM-PAC 6 Clicks Score (PT)  20  -SJ  --         How much help from another is currently needed...    Putting on and taking off regular lower body clothing?  --  3  -TA     Bathing (including washing, rinsing, and drying)  --  3  -TA     Toileting (which includes using toilet bed pan or urinal)  --  3  -TA     Putting on and taking off regular upper body clothing  --  4  -TA     Taking care of personal grooming (such as brushing teeth)  --  4  -TA     Eating meals  --  4  -TA     AM-PAC 6 Clicks Score (OT)  --  21  -TA        Functional Assessment    Outcome Measure Options  AM-PAC 6 Clicks Basic Mobility (PT)  -  AM-PAC 6 Clicks Daily Activity (OT)  -TA       User Key  (r) = Recorded By, (t) = Taken By, (c) = Cosigned By    Initials Name Provider Type    Alecia Hu, PT Physical Therapist    Nasir Enriquez, OT Occupational Therapist          Therapy Suggested Charges     Code   Minutes Charges    None                 OT Discharge Summary  Reason for Discharge: Discharge from facility  Outcomes Achieved: Refer to plan of care for updates on goals achieved, Other(Patient only seen for evaluation prior to discharge.)  Discharge Destination: Home      Stella Carolyn Vargas OT  8/12/2019

## 2019-08-13 ENCOUNTER — HOSPITAL ENCOUNTER (OUTPATIENT)
Dept: NEUROLOGY | Facility: HOSPITAL | Age: 84
Discharge: HOME | End: 2019-08-13
Admitting: GENERAL PRACTICE

## 2019-08-13 DIAGNOSIS — R55 SYNCOPE AND COLLAPSE: ICD-10-CM

## 2019-08-13 PROCEDURE — 95816 EEG AWAKE AND DROWSY: CPT

## 2020-04-27 ENCOUNTER — LAB (OUTPATIENT)
Dept: LAB | Facility: HOSPITAL | Age: 85
End: 2020-04-27

## 2020-04-27 ENCOUNTER — TRANSCRIBE ORDERS (OUTPATIENT)
Dept: LAB | Facility: HOSPITAL | Age: 85
End: 2020-04-27

## 2020-04-27 DIAGNOSIS — B96.89 BACTERIAL CHOLANGITIS: ICD-10-CM

## 2020-04-27 DIAGNOSIS — N39.0 URINARY TRACT INFECTION WITHOUT HEMATURIA, SITE UNSPECIFIED: Primary | ICD-10-CM

## 2020-04-27 DIAGNOSIS — K83.09 BACTERIAL CHOLANGITIS: ICD-10-CM

## 2020-04-27 DIAGNOSIS — N39.0 URINARY TRACT INFECTION WITHOUT HEMATURIA, SITE UNSPECIFIED: ICD-10-CM

## 2020-04-27 LAB
BACTERIA UR QL AUTO: ABNORMAL /HPF
BILIRUB UR QL STRIP: NEGATIVE
CLARITY UR: ABNORMAL
COLOR UR: YELLOW
GLUCOSE UR STRIP-MCNC: NEGATIVE MG/DL
HGB UR QL STRIP.AUTO: ABNORMAL
HYALINE CASTS UR QL AUTO: ABNORMAL /LPF
KETONES UR QL STRIP: NEGATIVE
LEUKOCYTE ESTERASE UR QL STRIP.AUTO: ABNORMAL
NITRITE UR QL STRIP: NEGATIVE
PH UR STRIP.AUTO: 6.5 [PH] (ref 5–8)
PROT UR QL STRIP: NEGATIVE
RBC # UR: ABNORMAL /HPF
REF LAB TEST METHOD: ABNORMAL
SP GR UR STRIP: 1.01 (ref 1–1.03)
SQUAMOUS #/AREA URNS HPF: ABNORMAL /HPF
UROBILINOGEN UR QL STRIP: ABNORMAL
WBC UR QL AUTO: ABNORMAL /HPF

## 2020-04-27 PROCEDURE — 87086 URINE CULTURE/COLONY COUNT: CPT

## 2020-04-27 PROCEDURE — 81001 URINALYSIS AUTO W/SCOPE: CPT

## 2020-04-27 PROCEDURE — 87186 SC STD MICRODIL/AGAR DIL: CPT

## 2020-04-27 PROCEDURE — 87088 URINE BACTERIA CULTURE: CPT

## 2020-04-29 LAB — BACTERIA SPEC AEROBE CULT: ABNORMAL

## 2020-06-26 ENCOUNTER — LAB (OUTPATIENT)
Dept: LAB | Facility: HOSPITAL | Age: 85
End: 2020-06-26

## 2020-06-26 ENCOUNTER — TRANSCRIBE ORDERS (OUTPATIENT)
Dept: LAB | Facility: HOSPITAL | Age: 85
End: 2020-06-26

## 2020-06-26 DIAGNOSIS — K57.32 DIVERTICULITIS OF LARGE INTESTINE, UNSPECIFIED BLEEDING STATUS, UNSPECIFIED COMPLICATION STATUS: ICD-10-CM

## 2020-06-26 DIAGNOSIS — I10 ESSENTIAL HYPERTENSION, MALIGNANT: ICD-10-CM

## 2020-06-26 DIAGNOSIS — N32.9 DISEASE OF BLADDER: ICD-10-CM

## 2020-06-26 DIAGNOSIS — K02.7 DENTAL CARIES OF ROOT SURFACE: ICD-10-CM

## 2020-06-26 LAB
BACTERIA UR QL AUTO: NORMAL /HPF
BILIRUB UR QL STRIP: NEGATIVE
CLARITY UR: CLEAR
COLOR UR: YELLOW
GLUCOSE UR STRIP-MCNC: NEGATIVE MG/DL
HGB UR QL STRIP.AUTO: NEGATIVE
HYALINE CASTS UR QL AUTO: NORMAL /LPF
KETONES UR QL STRIP: NEGATIVE
LEUKOCYTE ESTERASE UR QL STRIP.AUTO: NEGATIVE
NITRITE UR QL STRIP: NEGATIVE
PH UR STRIP.AUTO: 6 [PH] (ref 5–8)
PROT UR QL STRIP: NEGATIVE
RBC # UR: NORMAL /HPF
REF LAB TEST METHOD: NORMAL
SP GR UR STRIP: 1.01 (ref 1–1.03)
SQUAMOUS #/AREA URNS HPF: NORMAL /HPF
UROBILINOGEN UR QL STRIP: NORMAL
WBC UR QL AUTO: NORMAL /HPF

## 2020-06-26 PROCEDURE — 87086 URINE CULTURE/COLONY COUNT: CPT

## 2020-06-26 PROCEDURE — 81001 URINALYSIS AUTO W/SCOPE: CPT

## 2020-06-27 LAB — BACTERIA SPEC AEROBE CULT: NORMAL

## 2020-12-11 NOTE — TELEPHONE ENCOUNTER
Last annual : 11/8/2019  Next annual : not scheduled.     Note to pharmacy that patient needs appt for any further refills.

## 2020-12-13 RX ORDER — ESTROGENS, CONJUGATED 0.45 MG/1
TABLET, FILM COATED ORAL
Qty: 30 TABLET | Refills: 0 | Status: SHIPPED | OUTPATIENT
Start: 2020-12-13 | End: 2021-01-21 | Stop reason: SDUPTHER

## 2021-01-11 RX ORDER — ESTROGENS, CONJUGATED 0.45 MG/1
TABLET, FILM COATED ORAL
Qty: 30 TABLET | Refills: 0 | OUTPATIENT
Start: 2021-01-11

## 2021-02-04 ENCOUNTER — HOSPITAL ENCOUNTER (INPATIENT)
Facility: HOSPITAL | Age: 86
LOS: 5 days | Discharge: HOME HEALTH | End: 2021-02-09
Attending: EMERGENCY MEDICINE | Admitting: INTERNAL MEDICINE

## 2021-02-04 ENCOUNTER — APPOINTMENT (OUTPATIENT)
Dept: GENERAL RADIOLOGY | Facility: HOSPITAL | Age: 86
End: 2021-02-04

## 2021-02-04 ENCOUNTER — APPOINTMENT (OUTPATIENT)
Dept: CT IMAGING | Facility: HOSPITAL | Age: 86
End: 2021-02-04

## 2021-02-04 DIAGNOSIS — R09.02 HYPOXIA: ICD-10-CM

## 2021-02-04 DIAGNOSIS — U07.1 PNEUMONIA DUE TO COVID-19 VIRUS: Primary | ICD-10-CM

## 2021-02-04 DIAGNOSIS — J12.82 PNEUMONIA DUE TO COVID-19 VIRUS: Primary | ICD-10-CM

## 2021-02-04 LAB
ABO GROUP BLD: NORMAL
ALBUMIN SERPL-MCNC: 3.1 G/DL (ref 3.5–5.2)
ALBUMIN/GLOB SERPL: 0.9 G/DL
ALP SERPL-CCNC: 76 U/L (ref 39–117)
ALT SERPL W P-5'-P-CCNC: 8 U/L (ref 1–33)
ANION GAP SERPL CALCULATED.3IONS-SCNC: 8 MMOL/L (ref 5–15)
AST SERPL-CCNC: 25 U/L (ref 1–32)
B PARAPERT DNA SPEC QL NAA+PROBE: NOT DETECTED
B PERT DNA SPEC QL NAA+PROBE: NOT DETECTED
BACTERIA UR QL AUTO: ABNORMAL /HPF
BASOPHILS # BLD AUTO: 0.01 10*3/MM3 (ref 0–0.2)
BASOPHILS NFR BLD AUTO: 0.2 % (ref 0–1.5)
BILIRUB SERPL-MCNC: 0.3 MG/DL (ref 0–1.2)
BILIRUB UR QL STRIP: NEGATIVE
BLD GP AB SCN SERPL QL: NEGATIVE
BUN SERPL-MCNC: 16 MG/DL (ref 8–23)
BUN/CREAT SERPL: 15.4 (ref 7–25)
C PNEUM DNA NPH QL NAA+NON-PROBE: NOT DETECTED
CALCIUM SPEC-SCNC: 8.8 MG/DL (ref 8.6–10.5)
CHLORIDE SERPL-SCNC: 103 MMOL/L (ref 98–107)
CLARITY UR: ABNORMAL
CO2 SERPL-SCNC: 28 MMOL/L (ref 22–29)
COLOR UR: YELLOW
CREAT SERPL-MCNC: 1.04 MG/DL (ref 0.57–1)
D DIMER PPP FEU-MCNC: 1.02 MCGFEU/ML (ref 0–0.56)
D-LACTATE SERPL-SCNC: 0.9 MMOL/L (ref 0.5–2)
D-LACTATE SERPL-SCNC: 2.3 MMOL/L (ref 0.5–2)
DEPRECATED RDW RBC AUTO: 46.8 FL (ref 37–54)
DEVELOPER EXPIRATION DATE: NORMAL
DEVELOPER LOT NUMBER: NORMAL
EOSINOPHIL # BLD AUTO: 0.02 10*3/MM3 (ref 0–0.4)
EOSINOPHIL NFR BLD AUTO: 0.5 % (ref 0.3–6.2)
ERYTHROCYTE [DISTWIDTH] IN BLOOD BY AUTOMATED COUNT: 17 % (ref 12.3–15.4)
EXPIRATION DATE: NORMAL
FECAL OCCULT BLOOD SCREEN, POC: NEGATIVE
FERRITIN SERPL-MCNC: 157.7 NG/ML (ref 13–150)
FERRITIN SERPL-MCNC: 164.2 NG/ML (ref 13–150)
FLUAV H1 2009 PAND RNA NPH QL NAA+PROBE: NOT DETECTED
FLUAV H1 HA GENE NPH QL NAA+PROBE: NOT DETECTED
FLUAV H3 RNA NPH QL NAA+PROBE: NOT DETECTED
FLUAV SUBTYP SPEC NAA+PROBE: NOT DETECTED
FLUBV RNA ISLT QL NAA+PROBE: NOT DETECTED
GFR SERPL CREATININE-BSD FRML MDRD: 51 ML/MIN/1.73
GLOBULIN UR ELPH-MCNC: 3.5 GM/DL
GLUCOSE SERPL-MCNC: 133 MG/DL (ref 65–99)
GLUCOSE UR STRIP-MCNC: NEGATIVE MG/DL
HADV DNA SPEC NAA+PROBE: NOT DETECTED
HAPTOGLOB SERPL-MCNC: 283 MG/DL (ref 30–200)
HCOV 229E RNA SPEC QL NAA+PROBE: NOT DETECTED
HCOV HKU1 RNA SPEC QL NAA+PROBE: NOT DETECTED
HCOV NL63 RNA SPEC QL NAA+PROBE: NOT DETECTED
HCOV OC43 RNA SPEC QL NAA+PROBE: NOT DETECTED
HCT VFR BLD AUTO: 30.5 % (ref 34–46.6)
HGB BLD-MCNC: 9 G/DL (ref 12–15.9)
HGB UR QL STRIP.AUTO: NEGATIVE
HMPV RNA NPH QL NAA+NON-PROBE: NOT DETECTED
HOLD SPECIMEN: NORMAL
HPIV1 RNA SPEC QL NAA+PROBE: NOT DETECTED
HPIV2 RNA SPEC QL NAA+PROBE: NOT DETECTED
HPIV3 RNA NPH QL NAA+PROBE: NOT DETECTED
HPIV4 P GENE NPH QL NAA+PROBE: NOT DETECTED
HYALINE CASTS UR QL AUTO: ABNORMAL /LPF
IMM GRANULOCYTES # BLD AUTO: 0.02 10*3/MM3 (ref 0–0.05)
IMM GRANULOCYTES NFR BLD AUTO: 0.5 % (ref 0–0.5)
IRON 24H UR-MRATE: 21 MCG/DL (ref 37–145)
KETONES UR QL STRIP: NEGATIVE
LACTATE HOLD SPECIMEN: NORMAL
LDH SERPL-CCNC: 305 U/L (ref 135–214)
LDH SERPL-CCNC: 374 U/L (ref 135–214)
LEUKOCYTE ESTERASE UR QL STRIP.AUTO: ABNORMAL
LYMPHOCYTES # BLD AUTO: 0.56 10*3/MM3 (ref 0.7–3.1)
LYMPHOCYTES NFR BLD AUTO: 13.4 % (ref 19.6–45.3)
Lab: NORMAL
M PNEUMO IGG SER IA-ACNC: NOT DETECTED
MCH RBC QN AUTO: 22.4 PG (ref 26.6–33)
MCHC RBC AUTO-ENTMCNC: 29.5 G/DL (ref 31.5–35.7)
MCV RBC AUTO: 76.1 FL (ref 79–97)
MONOCYTES # BLD AUTO: 0.19 10*3/MM3 (ref 0.1–0.9)
MONOCYTES NFR BLD AUTO: 4.6 % (ref 5–12)
NEGATIVE CONTROL: NEGATIVE
NEUTROPHILS NFR BLD AUTO: 3.37 10*3/MM3 (ref 1.7–7)
NEUTROPHILS NFR BLD AUTO: 80.8 % (ref 42.7–76)
NITRITE UR QL STRIP: NEGATIVE
NRBC BLD AUTO-RTO: 0 /100 WBC (ref 0–0.2)
NT-PROBNP SERPL-MCNC: 2336 PG/ML (ref 0–1800)
PH UR STRIP.AUTO: 8.5 [PH] (ref 5–8)
PLATELET # BLD AUTO: 224 10*3/MM3 (ref 140–450)
PMV BLD AUTO: 9.3 FL (ref 6–12)
POSITIVE CONTROL: POSITIVE
POTASSIUM SERPL-SCNC: 4.3 MMOL/L (ref 3.5–5.2)
PROCALCITONIN SERPL-MCNC: 0.04 NG/ML (ref 0–0.25)
PROCALCITONIN SERPL-MCNC: 0.08 NG/ML (ref 0–0.25)
PROT SERPL-MCNC: 6.6 G/DL (ref 6–8.5)
PROT UR QL STRIP: ABNORMAL
QT INTERVAL: 468 MS
QTC INTERVAL: 468 MS
RBC # BLD AUTO: 4.01 10*6/MM3 (ref 3.77–5.28)
RBC # UR: ABNORMAL /HPF
REF LAB TEST METHOD: ABNORMAL
RETICS # AUTO: 0.02 10*6/MM3 (ref 0.02–0.13)
RETICS/RBC NFR AUTO: 0.57 % (ref 0.7–1.9)
RH BLD: POSITIVE
RHINOVIRUS RNA SPEC NAA+PROBE: NOT DETECTED
RSV RNA NPH QL NAA+NON-PROBE: NOT DETECTED
SARS-COV-2 RNA NPH QL NAA+NON-PROBE: DETECTED
SODIUM SERPL-SCNC: 139 MMOL/L (ref 136–145)
SP GR UR STRIP: 1.02 (ref 1–1.03)
SQUAMOUS #/AREA URNS HPF: ABNORMAL /HPF
T&S EXPIRATION DATE: NORMAL
TROPONIN T SERPL-MCNC: <0.01 NG/ML (ref 0–0.03)
UROBILINOGEN UR QL STRIP: ABNORMAL
WBC # BLD AUTO: 4.17 10*3/MM3 (ref 3.4–10.8)
WBC UR QL AUTO: ABNORMAL /HPF
WHOLE BLOOD HOLD SPECIMEN: NORMAL
WHOLE BLOOD HOLD SPECIMEN: NORMAL

## 2021-02-04 PROCEDURE — 86901 BLOOD TYPING SEROLOGIC RH(D): CPT | Performed by: INTERNAL MEDICINE

## 2021-02-04 PROCEDURE — 84484 ASSAY OF TROPONIN QUANT: CPT | Performed by: EMERGENCY MEDICINE

## 2021-02-04 PROCEDURE — 86850 RBC ANTIBODY SCREEN: CPT | Performed by: INTERNAL MEDICINE

## 2021-02-04 PROCEDURE — 84145 PROCALCITONIN (PCT): CPT | Performed by: PHYSICIAN ASSISTANT

## 2021-02-04 PROCEDURE — 93005 ELECTROCARDIOGRAM TRACING: CPT | Performed by: EMERGENCY MEDICINE

## 2021-02-04 PROCEDURE — 85025 COMPLETE CBC W/AUTO DIFF WBC: CPT | Performed by: EMERGENCY MEDICINE

## 2021-02-04 PROCEDURE — 25010000002 ENOXAPARIN PER 10 MG: Performed by: NURSE PRACTITIONER

## 2021-02-04 PROCEDURE — 86900 BLOOD TYPING SEROLOGIC ABO: CPT | Performed by: INTERNAL MEDICINE

## 2021-02-04 PROCEDURE — 83605 ASSAY OF LACTIC ACID: CPT | Performed by: PHYSICIAN ASSISTANT

## 2021-02-04 PROCEDURE — 83615 LACTATE (LD) (LDH) ENZYME: CPT | Performed by: PHYSICIAN ASSISTANT

## 2021-02-04 PROCEDURE — 0202U NFCT DS 22 TRGT SARS-COV-2: CPT | Performed by: PHYSICIAN ASSISTANT

## 2021-02-04 PROCEDURE — 82728 ASSAY OF FERRITIN: CPT | Performed by: INTERNAL MEDICINE

## 2021-02-04 PROCEDURE — 82607 VITAMIN B-12: CPT | Performed by: INTERNAL MEDICINE

## 2021-02-04 PROCEDURE — 83010 ASSAY OF HAPTOGLOBIN QUANT: CPT | Performed by: INTERNAL MEDICINE

## 2021-02-04 PROCEDURE — 94640 AIRWAY INHALATION TREATMENT: CPT

## 2021-02-04 PROCEDURE — 71045 X-RAY EXAM CHEST 1 VIEW: CPT

## 2021-02-04 PROCEDURE — 85379 FIBRIN DEGRADATION QUANT: CPT | Performed by: NURSE PRACTITIONER

## 2021-02-04 PROCEDURE — 83540 ASSAY OF IRON: CPT | Performed by: INTERNAL MEDICINE

## 2021-02-04 PROCEDURE — 82746 ASSAY OF FOLIC ACID SERUM: CPT | Performed by: INTERNAL MEDICINE

## 2021-02-04 PROCEDURE — 84145 PROCALCITONIN (PCT): CPT | Performed by: NURSE PRACTITIONER

## 2021-02-04 PROCEDURE — 83880 ASSAY OF NATRIURETIC PEPTIDE: CPT | Performed by: EMERGENCY MEDICINE

## 2021-02-04 PROCEDURE — 82728 ASSAY OF FERRITIN: CPT | Performed by: NURSE PRACTITIONER

## 2021-02-04 PROCEDURE — 87150 DNA/RNA AMPLIFIED PROBE: CPT | Performed by: PHYSICIAN ASSISTANT

## 2021-02-04 PROCEDURE — 82270 OCCULT BLOOD FECES: CPT | Performed by: PHYSICIAN ASSISTANT

## 2021-02-04 PROCEDURE — 87040 BLOOD CULTURE FOR BACTERIA: CPT | Performed by: PHYSICIAN ASSISTANT

## 2021-02-04 PROCEDURE — 99285 EMERGENCY DEPT VISIT HI MDM: CPT

## 2021-02-04 PROCEDURE — 83615 LACTATE (LD) (LDH) ENZYME: CPT | Performed by: NURSE PRACTITIONER

## 2021-02-04 PROCEDURE — 0 IOPAMIDOL PER 1 ML: Performed by: EMERGENCY MEDICINE

## 2021-02-04 PROCEDURE — 71275 CT ANGIOGRAPHY CHEST: CPT

## 2021-02-04 PROCEDURE — 94799 UNLISTED PULMONARY SVC/PX: CPT

## 2021-02-04 PROCEDURE — 81001 URINALYSIS AUTO W/SCOPE: CPT | Performed by: PHYSICIAN ASSISTANT

## 2021-02-04 PROCEDURE — 99223 1ST HOSP IP/OBS HIGH 75: CPT | Performed by: INTERNAL MEDICINE

## 2021-02-04 PROCEDURE — 25010000002 DEXAMETHASONE SODIUM PHOSPHATE 100 MG/10ML SOLUTION: Performed by: PHYSICIAN ASSISTANT

## 2021-02-04 PROCEDURE — 87147 CULTURE TYPE IMMUNOLOGIC: CPT | Performed by: PHYSICIAN ASSISTANT

## 2021-02-04 PROCEDURE — 80053 COMPREHEN METABOLIC PANEL: CPT | Performed by: EMERGENCY MEDICINE

## 2021-02-04 PROCEDURE — 85045 AUTOMATED RETICULOCYTE COUNT: CPT | Performed by: INTERNAL MEDICINE

## 2021-02-04 RX ORDER — ALBUTEROL SULFATE 90 UG/1
2 AEROSOL, METERED RESPIRATORY (INHALATION) EVERY 6 HOURS PRN
Status: DISCONTINUED | OUTPATIENT
Start: 2021-02-04 | End: 2021-02-09 | Stop reason: HOSPADM

## 2021-02-04 RX ORDER — PANTOPRAZOLE SODIUM 40 MG/1
40 TABLET, DELAYED RELEASE ORAL EVERY MORNING
Status: DISCONTINUED | OUTPATIENT
Start: 2021-02-05 | End: 2021-02-09 | Stop reason: HOSPADM

## 2021-02-04 RX ORDER — NYSTATIN 100000 [USP'U]/G
POWDER TOPICAL EVERY 12 HOURS SCHEDULED
Status: DISCONTINUED | OUTPATIENT
Start: 2021-02-04 | End: 2021-02-09 | Stop reason: HOSPADM

## 2021-02-04 RX ORDER — LABETALOL HYDROCHLORIDE 5 MG/ML
20 INJECTION, SOLUTION INTRAVENOUS EVERY 4 HOURS PRN
Status: DISCONTINUED | OUTPATIENT
Start: 2021-02-04 | End: 2021-02-09 | Stop reason: HOSPADM

## 2021-02-04 RX ORDER — LOSARTAN POTASSIUM 50 MG/1
100 TABLET ORAL DAILY
Status: DISCONTINUED | OUTPATIENT
Start: 2021-02-05 | End: 2021-02-08

## 2021-02-04 RX ORDER — SODIUM CHLORIDE 0.9 % (FLUSH) 0.9 %
10 SYRINGE (ML) INJECTION AS NEEDED
Status: DISCONTINUED | OUTPATIENT
Start: 2021-02-04 | End: 2021-02-09 | Stop reason: HOSPADM

## 2021-02-04 RX ORDER — LEVOTHYROXINE SODIUM 0.07 MG/1
75 TABLET ORAL
Status: DISCONTINUED | OUTPATIENT
Start: 2021-02-05 | End: 2021-02-09 | Stop reason: HOSPADM

## 2021-02-04 RX ORDER — LABETALOL HYDROCHLORIDE 5 MG/ML
10 INJECTION, SOLUTION INTRAVENOUS ONCE
Status: COMPLETED | OUTPATIENT
Start: 2021-02-04 | End: 2021-02-04

## 2021-02-04 RX ORDER — LABETALOL 100 MG/1
100 TABLET, FILM COATED ORAL DAILY
Status: DISCONTINUED | OUTPATIENT
Start: 2021-02-05 | End: 2021-02-04

## 2021-02-04 RX ORDER — BENZONATATE 100 MG/1
100 CAPSULE ORAL 3 TIMES DAILY PRN
Status: DISCONTINUED | OUTPATIENT
Start: 2021-02-04 | End: 2021-02-09 | Stop reason: HOSPADM

## 2021-02-04 RX ORDER — ALBUTEROL SULFATE 90 UG/1
4 AEROSOL, METERED RESPIRATORY (INHALATION) ONCE
Status: COMPLETED | OUTPATIENT
Start: 2021-02-04 | End: 2021-02-04

## 2021-02-04 RX ORDER — ASPIRIN 81 MG/1
81 TABLET ORAL DAILY
Status: DISCONTINUED | OUTPATIENT
Start: 2021-02-05 | End: 2021-02-09 | Stop reason: HOSPADM

## 2021-02-04 RX ORDER — DEXAMETHASONE SODIUM PHOSPHATE 4 MG/ML
6 INJECTION, SOLUTION INTRA-ARTICULAR; INTRALESIONAL; INTRAMUSCULAR; INTRAVENOUS; SOFT TISSUE
Status: DISCONTINUED | OUTPATIENT
Start: 2021-02-05 | End: 2021-02-09 | Stop reason: HOSPADM

## 2021-02-04 RX ORDER — LOSARTAN POTASSIUM 100 MG/1
100 TABLET ORAL DAILY
COMMUNITY

## 2021-02-04 RX ORDER — LABETALOL 100 MG/1
100 TABLET, FILM COATED ORAL EVERY 8 HOURS SCHEDULED
Status: DISCONTINUED | OUTPATIENT
Start: 2021-02-04 | End: 2021-02-09 | Stop reason: HOSPADM

## 2021-02-04 RX ORDER — METHENAMINE HIPPURATE 1000 MG/1
1 TABLET ORAL DAILY
COMMUNITY

## 2021-02-04 RX ADMIN — DEXAMETHASONE SODIUM PHOSPHATE 10 MG: 10 INJECTION, SOLUTION INTRAMUSCULAR; INTRAVENOUS at 18:26

## 2021-02-04 RX ADMIN — LABETALOL 20 MG/4 ML (5 MG/ML) INTRAVENOUS SYRINGE 10 MG: at 21:19

## 2021-02-04 RX ADMIN — LABETALOL HYDROCHLORIDE 100 MG: 100 TABLET, FILM COATED ORAL at 23:05

## 2021-02-04 RX ADMIN — NYSTATIN: 100000 POWDER TOPICAL at 23:06

## 2021-02-04 RX ADMIN — ENOXAPARIN SODIUM 40 MG: 40 INJECTION SUBCUTANEOUS at 23:06

## 2021-02-04 RX ADMIN — IOPAMIDOL 70 ML: 755 INJECTION, SOLUTION INTRAVENOUS at 20:01

## 2021-02-04 RX ADMIN — ALBUTEROL SULFATE 4 PUFF: 108 AEROSOL, METERED RESPIRATORY (INHALATION) at 14:22

## 2021-02-04 RX ADMIN — REMDESIVIR 200 MG: 100 INJECTION, POWDER, LYOPHILIZED, FOR SOLUTION INTRAVENOUS at 23:23

## 2021-02-04 NOTE — H&P
[facility] Medicine Services  HISTORY AND PHYSICAL    Patient Name: Anitra Dee  : 1935  MRN: [id number]  Primary Care Physician: Eleuterio Myers MD  Date of admission: 2021    Subjective   Subjective     Chief Complaint:  SOB     HPI:  Anitra Dee is a 85 y.o. female with pmh of hypothyroidism, htn, GERD, asthma, arthritis, and PNA. [facility]B. She was diagnosed with COVID 8 days ago. She had been doing ok until 2 days ago when she started having increasing sob. She has a 02 sat monitor at home and her oxygen level has been  Between 86-91%. She has not had any outpatient treatment for COVID. She has lost taste and smell. She had one episode of diarrhea.     In ED: room air 84%, bnp 2336, creat 1.04, lactate 2.3-0.9, procal 0.08, hgb 9.0,       Current COVID Risks are:  [] Fever [x]  Cough [x] Shortness of breath [] Fatigue [] Change in taste or smell    [] Exposure to COVID positive patient  [] High risk facility   []  NONE    Review of Systems   Constitutional: Negative for chills and fever.   Respiratory: Positive for shortness of breath.    Cardiovascular: Negative for chest pain.   Genitourinary: Negative for dysuria.        All other systems reviewed and are negative.     Personal History     Past Medical History:   Diagnosis Date   • Arthritis    • Asthma    • COVID-19    • GERD (gastroesophageal reflux disease)    • Hypertension    • Hypothyroid    • Pneumonia    • Post menopausal syndrome        Past Surgical History:   Procedure Laterality Date   • HYSTERECTOMY     • KNEE ARTHROPLASTY     • REPAIR PELVIC FLOOR DEFECT VAGINAL APPROACH W/ MESH     • SHOULDER ARTHROTOMY         Family History: family history includes Cancer in her mother; Lung disease in her mother; Other in her father. Otherwise pertinent FHx was reviewed and unremarkable.     Social History:  reports that she has quit smoking. She has  never used smokeless tobacco. She reports that she does not drink alcohol or use drugs.  She is     Medications:  aspirin, labetalol, levothyroxine, losartan, methenamine, omeprazole, and umeclidinium-vilanterol    Allergies   Allergen Reactions   • Latex Hives       Objective   Objective     Vital Signs:   Temp:  [97.9 °F (36.6 °C)] 97.9 °F (36.6 °C)  Heart Rate:  [61-75] 65  Resp:  [20-22] 22  BP: (147-197)/(56-78) 186/67  Flow (L/min):  [2.5] 2.5    Physical Exam   Patient is alert and talkative in no distress at rest, non toxic  Neck is without mass or JVD  Heart is Reg wo murmur  Lungs are unlabored until she coughs  Abd is obese, not distended  MAEW  Skin is without rash  Neurologic exam in nonfocal   Mood is appropriate      Results Reviewed:  I have personally reviewed most recent indicated data and agree with findings including:  [x]  Laboratory  [x]  Radiology  [x]  EKG/Telemetry  []  Pathology  []  Cardiac/Vascular Studies  [x]  Old records  []  Other:  Most pertinent findings include:new anemia, elevated inflammatory markers    LAB RESULTS:      Lab 02/04/21  1629 02/04/21  1216   WBC  --  4.17   HEMOGLOBIN  --  9.0*   HEMATOCRIT  --  30.5*   PLATELETS  --  224   NEUTROS ABS  --  3.37   IMMATURE GRANS (ABS)  --  0.02   LYMPHS ABS  --  0.56*   MONOS ABS  --  0.19   EOS ABS  --  0.02   MCV  --  76.1*   PROCALCITONIN  --  0.08   LACTATE 0.9 2.3*   LDH  --  305*         Lab 02/04/21  1216   SODIUM 139   POTASSIUM 4.3   CHLORIDE 103   CO2 28.0   ANION GAP 8.0   BUN 16   CREATININE 1.04*   GLUCOSE 133*   CALCIUM 8.8         Lab 02/04/21  1216   TOTAL PROTEIN 6.6   ALBUMIN 3.10*   GLOBULIN 3.5   ALT (SGPT) 8   AST (SGOT) 25   BILIRUBIN 0.3   ALK PHOS 76         Lab 02/04/21  1216   PROBNP 2,336.0*   TROPONIN T <0.010                 Brief Urine Lab Results  (Last result in the past 365 days)      Color   Clarity   Blood   Leuk Est   Nitrite   Protein   CREAT   Urine HCG        02/04/21 1351 Yellow Turbid  Negative Moderate (2+) Negative Trace             Microbiology Results (last 10 days)     Procedure Component Value - Date/Time    COVID PRE-OP / PRE-PROCEDURE SCREENING ORDER (NO ISOLATION) - Swab, Nasopharynx [[id number]]  (Abnormal) Collected: 02/04/21 1456    Lab Status: Final result Specimen: Swab from Nasopharynx Updated: 02/04/21 1633    Narrative:      The following orders were created for panel order COVID PRE-OP / PRE-PROCEDURE SCREENING ORDER (NO ISOLATION) - Swab, Nasopharynx.  Procedure                               Abnormality         Status                     ---------                               -----------         ------                     Respiratory Panel PCR w/...[[id number]]  Abnormal            Final result                 Please view results for these tests on the individual orders.    Respiratory Panel PCR w/COVID-19(SARS-CoV-2) FARRAH/ROZ/SUZY/PAD/COR/MAD/TAD In-House, NP Swab in UTM/VTM, 3-4 HR TAT - Swab, Nasopharynx [[id number]]  (Abnormal) Collected: 02/04/21 1456    Lab Status: Final result Specimen: Swab from Nasopharynx Updated: 02/04/21 1633     ADENOVIRUS, PCR Not Detected     Coronavirus 229E Not Detected     Coronavirus HKU1 Not Detected     Coronavirus NL63 Not Detected     Coronavirus OC43 Not Detected     COVID19 Detected     Human Metapneumovirus Not Detected     Human Rhinovirus/Enterovirus Not Detected     Influenza A PCR Not Detected     Influenza A H1 Not Detected     Influenza A H1 2009 PCR Not Detected     Influenza A H3 Not Detected     Influenza B PCR Not Detected     Parainfluenza Virus 1 Not Detected     Parainfluenza Virus 2 Not Detected     Parainfluenza Virus 3 Not Detected     Parainfluenza Virus 4 Not Detected     RSV, PCR Not Detected     Bordetella pertussis pcr Not Detected     Bordetella parapertussis PCR Not Detected     Chlamydophila pneumoniae PCR Not Detected     Mycoplasma pneumo by PCR Not Detected    Narrative:      Fact sheet for providers:  https://docs."[employer]"/wp-content/uploads/UII5552-6112-EF3.1-EUA-Provider-Fact-Sheet-3.pdf    Fact sheet for patients: https://docs."[employer]"/wp-content/uploads/NPW2717-2834-NH4.1-EUA-Patient-Fact-Sheet-1.pdf    Test performed by PCR.          Xr Chest 1 View    Result Date: 2/4/2021  EXAMINATION: XR CHEST 1 VW-  INDICATION: Shortness of air triage protocol.  COMPARISON: None.  FINDINGS: Single AP view of the chest reveals cardiac size borderline enlarged. Bilateral patchy opacifications within the mid and lower lungs consistent with airspace disease such as bronchopneumonia. No pneumothorax or pleural effusion. Degenerative changes of the spine.         Impression: Opacifications throughout the mid and lower lungs of a peripheral predominance consistent with acute airspace disease such as bronchopneumonia. No pleural effusion.  D:  02/04/2021 E:  02/04/2021  This report was finalized on 2/4/2021 4:55 PM by Dr. Anton Glass.        Results for orders placed during the hospital encounter of 08/09/19   Transthoracic Echo Complete With Contrast if Necessary Per Protocol    Narrative · Estimated EF = 65%.  · Mild mitral valve regurgitation is present  · Moderate tricuspid valve regurgitation is present.  · Calculated right ventricular systolic pressure from tricuspid   regurgitation is 51 mmHg.          Assessment/Plan   Assessment & Plan       Pneumonia due to COVID-19 virus    Hypertension    Hypothyroid    GERD (gastroesophageal reflux disease)    Asthma    Microcytic anemia      COVID-19 PNA  -- cont  Dexamethasone for 10 days  -- start  remdesivir and pharmacy to dose  -- oxygen prn to keep sat > 90%  -- prn albuterol and cough suppressants     Worsening anemia  -iron deficient, check occult stool  - further workup once respiratory status more stable  -watch closely heparin    Hypothyroidism  -- cont home meds     GERD  -- cont ppi     HTN  -- cont labetalol and cozaar   -- have tried IV labetalol deborah  without much success, adding nitropaste and cardene if needed.      DVT prophylaxis:  lovenox       CODE STATUS:  Full code   Code Status and Medical Interventions:   Ordered at: 02/04/21 1744     Code Status:    CPR     Medical Interventions (Level of Support Prior to Arrest):    Full         This note has been completed as part of a split-shared workflow.   Note initiated and   Electronically signed by NAA Lund, 02/04/21, 5:33 PM EST.  Patient seen and examined by Electronically signed by Eula Stevens MD, 02/04/21, 10:36 PM EST.

## 2021-02-04 NOTE — ED PROVIDER NOTES
Subjective   Ms. Dee is an 85-year-old female was diagnosed with Covid virus 8 days ago.  She states that she has been doing okay until about the past 2 days she been having increasing shortness of breath.  She has an O2 sat monitor at home and has been reading between 86 and 91%.  She does not have a history of COPD but does have a history of asthma and uses inhaler once a day.  She reports she noticed that she been increasingly short of breath and decided she prior need to come to the emergency department.  She has not had any recent fevers or chills.  Not had any outpatient treatment for this Covid infection.  She has no other complaints.  She has lost smell and taste.  Her test was done at outside facility.      History provided by:  Patient   used: No    Shortness of Breath  Severity:  Moderate  Onset quality:  Gradual  Duration:  3 days  Timing:  Constant  Progression:  Worsening  Chronicity:  New  Context: activity and URI    Context comment:  Diagnosed with COVID-19 8 days ago  Relieved by:  Rest  Worsened by:  Activity and coughing  Ineffective treatments:  None tried  Associated symptoms: sputum production and wheezing    Associated symptoms: no abdominal pain, no chest pain, no claudication, no diaphoresis, no ear pain, no fever, no neck pain, no rash and no vomiting    Risk factors: no family hx of DVT, no hx of cancer, no hx of PE/DVT, no recent surgery and no tobacco use        Review of Systems   Constitutional: Negative for diaphoresis and fever.   HENT: Negative for ear pain.    Respiratory: Positive for sputum production, shortness of breath and wheezing.    Cardiovascular: Negative for chest pain, palpitations and claudication.   Gastrointestinal: Negative for abdominal pain and vomiting.   Genitourinary: Negative for dysuria, frequency and urgency.   Musculoskeletal: Negative for back pain and neck pain.   Skin: Negative for pallor and rash.   Hematological: Negative for  adenopathy.   Psychiatric/Behavioral: Negative.    All other systems reviewed and are negative.      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     • SHOULDER ARTHROTOMY         Family History   Problem Relation Age of Onset   • Cancer Mother    • Lung disease Mother    • Other Father        Social History     Socioeconomic History   • Marital status:      Spouse name: Not on file   • Number of children: Not on file   • Years of education: Not on file   • Highest education level: Not on file   Tobacco Use   • Smoking status: Former Smoker   • Smokeless tobacco: Never Used   Substance and Sexual Activity   • Alcohol use: No   • Drug use: No   • Sexual activity: Defer           Objective   Physical Exam  Vitals signs and nursing note reviewed.   Constitutional:       General: She is not in acute distress.     Appearance: She is well-developed. She is not diaphoretic.   HENT:      Head: Normocephalic and atraumatic.      Nose: Nose normal.   Eyes:      General: No scleral icterus.     Conjunctiva/sclera: Conjunctivae normal.   Neck:      Musculoskeletal: Normal range of motion and neck supple.   Cardiovascular:      Rate and Rhythm: Normal rate and regular rhythm.      Heart sounds: Normal heart sounds. No murmur.   Pulmonary:      Effort: Pulmonary effort is normal. No respiratory distress.      Breath sounds: Examination of the right-middle field reveals wheezing. Examination of the left-middle field reveals wheezing. Examination of the right-lower field reveals decreased breath sounds and wheezing. Examination of the left-lower field reveals  decreased breath sounds and wheezing. Decreased breath sounds and wheezing present. No rhonchi or rales.   Abdominal:      General: Bowel sounds are normal.      Palpations: Abdomen is soft.      Tenderness: There is no abdominal tenderness.   Genitourinary:     Rectum: Guaiac result negative.      Comments: Rectal examination good sphincter tone stools are brown  Musculoskeletal: Normal range of motion.   Skin:     General: Skin is warm and dry.   Neurological:      Mental Status: She is alert and oriented to person, place, and time.   Psychiatric:         Behavior: Behavior normal.         Procedures           ED Course                                   Discussed the findings of the chest x-ray and laboratory data with Ms. Bergman.  Her hypoxia will warrant oxygen and admission.  Discussed this with the Dr. Stevens she is agreed to admit the patient.  Recent Results (from the past 24 hour(s))   ECG 12 Lead    Collection Time: 02/04/21 11:53 AM   Result Value Ref Range    QT Interval 468 ms    QTC Interval 468 ms   Comprehensive Metabolic Panel    Collection Time: 02/04/21 12:16 PM    Specimen: Blood   Result Value Ref Range    Glucose 133 (H) 65 - 99 mg/dL    BUN 16 8 - 23 mg/dL    Creatinine 1.04 (H) 0.57 - 1.00 mg/dL    Sodium 139 136 - 145 mmol/L    Potassium 4.3 3.5 - 5.2 mmol/L    Chloride 103 98 - 107 mmol/L    CO2 28.0 22.0 - 29.0 mmol/L    Calcium 8.8 8.6 - 10.5 mg/dL    Total Protein 6.6 6.0 - 8.5 g/dL    Albumin 3.10 (L) 3.50 - 5.20 g/dL    ALT (SGPT) 8 1 - 33 U/L    AST (SGOT) 25 1 - 32 U/L    Alkaline Phosphatase 76 39 - 117 U/L    Total Bilirubin 0.3 0.0 - 1.2 mg/dL    eGFR Non African Amer 51 (L) >60 mL/min/1.73    Globulin 3.5 gm/dL    A/G Ratio 0.9 g/dL    BUN/Creatinine Ratio 15.4 7.0 - 25.0    Anion Gap 8.0 5.0 - 15.0 mmol/L   BNP    Collection Time: 02/04/21 12:16 PM    Specimen: Blood   Result Value Ref Range    proBNP 2,336.0 (H) 0.0-1,800.0 pg/mL   Troponin    Collection Time: 02/04/21 12:16 PM     Specimen: Blood   Result Value Ref Range    Troponin T <0.010 0.000 - 0.030 ng/mL   Light Blue Top    Collection Time: 02/04/21 12:16 PM   Result Value Ref Range    Extra Tube hold for add-on    Green Top (Gel)    Collection Time: 02/04/21 12:16 PM   Result Value Ref Range    Extra Tube Hold for add-ons.    Lavender Top    Collection Time: 02/04/21 12:16 PM   Result Value Ref Range    Extra Tube     Gold Top - SST    Collection Time: 02/04/21 12:16 PM   Result Value Ref Range    Extra Tube Hold for add-ons.    Gray Top - Ice    Collection Time: 02/04/21 12:16 PM   Result Value Ref Range    Extra Tube Hold for add-ons.    CBC Auto Differential    Collection Time: 02/04/21 12:16 PM    Specimen: Blood   Result Value Ref Range    WBC 4.17 3.40 - 10.80 10*3/mm3    RBC 4.01 3.77 - 5.28 10*6/mm3    Hemoglobin 9.0 (L) 12.0 - 15.9 g/dL    Hematocrit 30.5 (L) 34.0 - 46.6 %    MCV 76.1 (L) 79.0 - 97.0 fL    MCH 22.4 (L) 26.6 - 33.0 pg    MCHC 29.5 (L) 31.5 - 35.7 g/dL    RDW 17.0 (H) 12.3 - 15.4 %    RDW-SD 46.8 37.0 - 54.0 fl    MPV 9.3 6.0 - 12.0 fL    Platelets 224 140 - 450 10*3/mm3    Neutrophil % 80.8 (H) 42.7 - 76.0 %    Lymphocyte % 13.4 (L) 19.6 - 45.3 %    Monocyte % 4.6 (L) 5.0 - 12.0 %    Eosinophil % 0.5 0.3 - 6.2 %    Basophil % 0.2 0.0 - 1.5 %    Immature Grans % 0.5 0.0 - 0.5 %    Neutrophils, Absolute 3.37 1.70 - 7.00 10*3/mm3    Lymphocytes, Absolute 0.56 (L) 0.70 - 3.10 10*3/mm3    Monocytes, Absolute 0.19 0.10 - 0.90 10*3/mm3    Eosinophils, Absolute 0.02 0.00 - 0.40 10*3/mm3    Basophils, Absolute 0.01 0.00 - 0.20 10*3/mm3    Immature Grans, Absolute 0.02 0.00 - 0.05 10*3/mm3    nRBC 0.0 0.0 - 0.2 /100 WBC   Lactate Dehydrogenase    Collection Time: 02/04/21 12:16 PM    Specimen: Blood   Result Value Ref Range     (H) 135 - 214 U/L   Procalcitonin    Collection Time: 02/04/21 12:16 PM    Specimen: Blood   Result Value Ref Range    Procalcitonin 0.08 0.00 - 0.25 ng/mL   Lactic Acid, Plasma     Collection Time: 02/04/21 12:16 PM    Specimen: Blood   Result Value Ref Range    Lactate 2.3 (C) 0.5 - 2.0 mmol/L   Lactic Acid, Reflex Timer (This will reflex a repeat order 3-3:15 hours after ordered.)    Collection Time: 02/04/21 12:16 PM    Specimen: Blood   Result Value Ref Range    Hold Tube Hold for add-ons.    Ferritin    Collection Time: 02/04/21 12:16 PM    Specimen: Blood   Result Value Ref Range    Ferritin 157.70 (H) 13.00 - 150.00 ng/mL   Iron    Collection Time: 02/04/21 12:16 PM    Specimen: Blood   Result Value Ref Range    Iron 21 (L) 37 - 145 mcg/dL   Reticulocytes    Collection Time: 02/04/21 12:16 PM    Specimen: Blood   Result Value Ref Range    Reticulocyte % 0.57 (L) 0.70 - 1.90 %    Reticulocyte Absolute 0.0226 0.0200 - 0.1300 10*6/mm3   Urinalysis With Microscopic If Indicated (No Culture) - Urine, Clean Catch    Collection Time: 02/04/21  1:51 PM    Specimen: Urine, Clean Catch   Result Value Ref Range    Color, UA Yellow Yellow, Straw    Appearance, UA Turbid (A) Clear    pH, UA 8.5 (H) 5.0 - 8.0    Specific Gravity, UA 1.018 1.001 - 1.030    Glucose, UA Negative Negative    Ketones, UA Negative Negative    Bilirubin, UA Negative Negative    Blood, UA Negative Negative    Protein, UA Trace (A) Negative    Leuk Esterase, UA Moderate (2+) (A) Negative    Nitrite, UA Negative Negative    Urobilinogen, UA 1.0 E.U./dL 0.2 - 1.0 E.U./dL   Urinalysis, Microscopic Only - Urine, Clean Catch    Collection Time: 02/04/21  1:51 PM    Specimen: Urine, Clean Catch   Result Value Ref Range    RBC, UA 0-2 None Seen, 0-2 /HPF    WBC, UA 6-12 (A) None Seen, 0-2 /HPF    Bacteria, UA 2+ (A) None Seen, Trace /HPF    Squamous Epithelial Cells, UA 3-6 (A) None Seen, 0-2 /HPF    Hyaline Casts, UA None Seen 0 - 6 /LPF    Methodology Manual Light Microscopy    Respiratory Panel PCR w/COVID-19(SARS-CoV-2) [facility]/ROZ/SUZY/PAD/[facility]/MAD/TAD In-House, NP Swab in UTM/VTM, 3-4 HR TAT - Swab, Nasopharynx    Collection Time:  02/04/21  2:56 PM    Specimen: Nasopharynx; Swab   Result Value Ref Range    ADENOVIRUS, PCR Not Detected Not Detected    Coronavirus 229E Not Detected Not Detected    Coronavirus HKU1 Not Detected Not Detected    Coronavirus NL63 Not Detected Not Detected    Coronavirus OC43 Not Detected Not Detected    COVID19 Detected (C) Not Detected - Ref. Range    Human Metapneumovirus Not Detected Not Detected    Human Rhinovirus/Enterovirus Not Detected Not Detected    Influenza A PCR Not Detected Not Detected    Influenza A H1 Not Detected Not Detected    Influenza A H1 2009 PCR Not Detected Not Detected    Influenza A H3 Not Detected Not Detected    Influenza B PCR Not Detected Not Detected    Parainfluenza Virus 1 Not Detected Not Detected    Parainfluenza Virus 2 Not Detected Not Detected    Parainfluenza Virus 3 Not Detected Not Detected    Parainfluenza Virus 4 Not Detected Not Detected    RSV, PCR Not Detected Not Detected    Bordetella pertussis pcr Not Detected Not Detected    Bordetella parapertussis PCR Not Detected Not Detected    Chlamydophila pneumoniae PCR Not Detected Not Detected    Mycoplasma pneumo by PCR Not Detected Not Detected   POC Occult Blood Stool    Collection Time: 02/04/21  3:26 PM    Specimen: Per Rectum; Stool   Result Value Ref Range    Fecal Occult Blood Negative Negative    Lot Number [id number]     Expiration Date 9/23     DEVELOPER LOT NUMBER [id number]     DEVELOPER EXPIRATION DATE 6/24     Positive Control Positive Positive    Negative Control Negative Negative   Lactic Acid, Reflex    Collection Time: 02/04/21  4:29 PM    Specimen: Blood   Result Value Ref Range    Lactate 0.9 0.5 - 2.0 mmol/L   Type & Screen    Collection Time: 02/04/21  6:19 PM    Specimen: Blood   Result Value Ref Range    ABO Type B     RH type Positive     Antibody Screen Negative     T&S Expiration Date 2/7/2021 11:59:59 PM      Note: In addition to lab results from this visit, the labs listed above may include labs  taken at another facility or during a different encounter within the last 24 hours. Please correlate lab times with ED admission and discharge times for further clarification of the services performed during this visit.    CT Angiogram Chest   Final Result   Multiple predominantly peripheral groundglass opacities compatible with history of COVID pneumonia.      No evidence of pulmonary embolus.      Cardiomegaly without failure.      Uncomplicated cholelithiasis.      Signer Name: SKYE Mathew MD    Signed: 2/4/2021 8:21 PM    Workstation Name: [facility]     [facility]      XR Chest 1 View   Final Result   Opacifications throughout the mid and lower lungs of a   peripheral predominance consistent with acute airspace disease such as   bronchopneumonia. No pleural effusion.       D:  02/04/2021   E:  02/04/2021       This report was finalized on 2/4/2021 4:55 PM by Dr. Anton Glass.            Vitals:    02/04/21 1730 02/04/21 1900 02/04/21 2043 02/04/21 2119   BP: (!) 186/67 175/62 (!) 206/74    BP Location:       Patient Position:       Pulse: 65 67 66 73   Resp:       Temp:       TempSrc:       SpO2: 98% 93% 100%    Weight:       Height:         Medications   sodium chloride 0.9 % flush 10 mL (has no administration in time range)   sodium chloride 0.9 % bolus 500 mL (has no administration in time range)   remdesivir 200 mg in sodium chloride 0.9 % 250 mL IVPB (powder vial) (has no administration in time range)     Followed by   remdesivir 100 mg in sodium chloride 0.9 % 250 mL IVPB (powder vial) (has no administration in time range)   Pharmacy Consult - Remdesivir (has no administration in time range)   levothyroxine (SYNTHROID, LEVOTHROID) tablet 75 mcg (has no administration in time range)   pantoprazole (PROTONIX) EC tablet 40 mg (has no administration in time range)   losartan (COZAAR) tablet 100 mg (has no administration in time range)   aspirin EC tablet 81 mg (has no  administration in time range)   labetalol (NORMODYNE) tablet 100 mg (has no administration in time range)   enoxaparin (LOVENOX) syringe 40 mg (has no administration in time range)   dexamethasone (DECADRON) tablet 6 mg (has no administration in time range)     Or   dexamethasone (DECADRON) injection 6 mg (has no administration in time range)   albuterol sulfate HFA (PROVENTIL HFA;VENTOLIN HFA;PROAIR HFA) inhaler 2 puff (has no administration in time range)   benzonatate (TESSALON) capsule 100 mg (has no administration in time range)   Pharmacy Consult - Pharmacy to dose (has no administration in time range)   albuterol sulfate HFA (PROVENTIL HFA;VENTOLIN HFA;PROAIR HFA) inhaler 4 puff (4 puffs Inhalation Given 2/4/21 1422)   dexamethasone (DECADRON) IVPB 10 mg (0 mg Intravenous Stopped 2/4/21 1939)   iopamidol (ISOVUE-370) 76 % injection 100 mL (70 mL Intravenous Given 2/4/21 2001)   labetalol (NORMODYNE,TRANDATE) injection 10 mg (10 mg Intravenous Given 2/4/21 2119)     ECG/EMG Results (last 24 hours)     Procedure Component Value Units Date/Time    ECG 12 Lead [[id number]] Collected: 02/04/21 1153     Updated: 02/04/21 1200     QT Interval 468 ms      QTC Interval 468 ms     Narrative:      Test Reason : SOA Protocol  Blood Pressure : **/** mmHG  Vent. Rate : 060 BPM     Atrial Rate : 060 BPM     P-R Int : 190 ms          QRS Dur : 102 ms      QT Int : 468 ms       P-R-T Axes : 053 060 065 degrees     QTc Int : 468 ms    Normal sinus rhythm  Nonspecific ST abnormality  Abnormal ECG  When compared with ECG of 09-AUG-2019 21:00,  No significant change was found  Confirmed by ILYA MAY MD ([id number]) on 2/4/2021 11:59:57 AM    Referred By:  edmd           Confirmed By:ILYA MAY MD        ECG 12 Lead   Final Result   Test Reason : SOA Protocol   Blood Pressure : **/** mmHG   Vent. Rate : 060 BPM     Atrial Rate : 060 BPM      P-R Int : 190 ms          QRS Dur : 102 ms       QT Int : 468 ms       P-R-T Axes : 053  060 065 degrees      QTc Int : 468 ms      Normal sinus rhythm   Nonspecific ST abnormality   Abnormal ECG   When compared with ECG of 09-AUG-2019 21:00,   No significant change was found   Confirmed by ILYA MAY MD ([id number]) on 2/4/2021 11:59:57 AM      Referred By:  edmd           Confirmed By:ILYA MAY MD                  MDM  Number of Diagnoses or Management Options  Hypoxia: new and requires workup  Pneumonia due to COVID-19 virus: new and requires workup     Amount and/or Complexity of Data Reviewed  Clinical lab tests: reviewed and ordered  Tests in the radiology section of CPT®: reviewed and ordered  Tests in the medicine section of CPT®: reviewed and ordered  Decide to obtain previous medical records or to obtain history from someone other than the patient: yes  Discuss the patient with other providers: yes    Patient Progress  Patient progress: stable      Final diagnoses:   Pneumonia due to COVID-19 virus   [employer] PA  02/04/21 2299

## 2021-02-05 LAB
ALBUMIN SERPL-MCNC: 3.1 G/DL (ref 3.5–5.2)
ALBUMIN/GLOB SERPL: 0.8 G/DL
ALP SERPL-CCNC: 87 U/L (ref 39–117)
ALT SERPL W P-5'-P-CCNC: 10 U/L (ref 1–33)
ANION GAP SERPL CALCULATED.3IONS-SCNC: 11 MMOL/L (ref 5–15)
AST SERPL-CCNC: 22 U/L (ref 1–32)
BACTERIA BLD CULT: ABNORMAL
BACTERIA SPEC AEROBE CULT: ABNORMAL
BASOPHILS # BLD AUTO: 0.01 10*3/MM3 (ref 0–0.2)
BASOPHILS NFR BLD AUTO: 0.4 % (ref 0–1.5)
BILIRUB SERPL-MCNC: 0.3 MG/DL (ref 0–1.2)
BUN SERPL-MCNC: 14 MG/DL (ref 8–23)
BUN/CREAT SERPL: 15.2 (ref 7–25)
CALCIUM SPEC-SCNC: 8.9 MG/DL (ref 8.6–10.5)
CHLORIDE SERPL-SCNC: 103 MMOL/L (ref 98–107)
CK SERPL-CCNC: 59 U/L (ref 20–180)
CO2 SERPL-SCNC: 24 MMOL/L (ref 22–29)
CREAT SERPL-MCNC: 0.92 MG/DL (ref 0.57–1)
CRP SERPL-MCNC: 10.51 MG/DL (ref 0–0.5)
D DIMER PPP FEU-MCNC: 0.72 MCGFEU/ML (ref 0–0.56)
DEPRECATED RDW RBC AUTO: 45.9 FL (ref 37–54)
EOSINOPHIL # BLD AUTO: 0 10*3/MM3 (ref 0–0.4)
EOSINOPHIL NFR BLD AUTO: 0 % (ref 0.3–6.2)
ERYTHROCYTE [DISTWIDTH] IN BLOOD BY AUTOMATED COUNT: 16.8 % (ref 12.3–15.4)
FERRITIN SERPL-MCNC: 144.2 NG/ML (ref 13–150)
FIBRINOGEN PPP-MCNC: 592 MG/DL (ref 215–430)
FOLATE SERPL-MCNC: 18.1 NG/ML (ref 4.78–24.2)
GFR SERPL CREATININE-BSD FRML MDRD: 58 ML/MIN/1.73
GLOBULIN UR ELPH-MCNC: 3.9 GM/DL
GLUCOSE SERPL-MCNC: 161 MG/DL (ref 65–99)
GRAM STN SPEC: ABNORMAL
HCT VFR BLD AUTO: 32.9 % (ref 34–46.6)
HGB BLD-MCNC: 9.8 G/DL (ref 12–15.9)
IMM GRANULOCYTES # BLD AUTO: 0.01 10*3/MM3 (ref 0–0.05)
IMM GRANULOCYTES NFR BLD AUTO: 0.4 % (ref 0–0.5)
ISOLATED FROM: ABNORMAL
LDH SERPL-CCNC: 313 U/L (ref 135–214)
LYMPHOCYTES # BLD AUTO: 0.3 10*3/MM3 (ref 0.7–3.1)
LYMPHOCYTES NFR BLD AUTO: 11.8 % (ref 19.6–45.3)
MCH RBC QN AUTO: 22.6 PG (ref 26.6–33)
MCHC RBC AUTO-ENTMCNC: 29.8 G/DL (ref 31.5–35.7)
MCV RBC AUTO: 75.8 FL (ref 79–97)
MONOCYTES # BLD AUTO: 0.08 10*3/MM3 (ref 0.1–0.9)
MONOCYTES NFR BLD AUTO: 3.1 % (ref 5–12)
NEUTROPHILS NFR BLD AUTO: 2.14 10*3/MM3 (ref 1.7–7)
NEUTROPHILS NFR BLD AUTO: 84.3 % (ref 42.7–76)
NRBC BLD AUTO-RTO: 0 /100 WBC (ref 0–0.2)
PLAT MORPH BLD: NORMAL
PLATELET # BLD AUTO: 261 10*3/MM3 (ref 140–450)
PMV BLD AUTO: 9.6 FL (ref 6–12)
POTASSIUM SERPL-SCNC: 3.9 MMOL/L (ref 3.5–5.2)
PROT SERPL-MCNC: 7 G/DL (ref 6–8.5)
RBC # BLD AUTO: 4.34 10*6/MM3 (ref 3.77–5.28)
RBC MORPH BLD: NORMAL
SODIUM SERPL-SCNC: 138 MMOL/L (ref 136–145)
VIT B12 BLD-MCNC: 857 PG/ML (ref 211–946)
WBC # BLD AUTO: 2.54 10*3/MM3 (ref 3.4–10.8)
WBC MORPH BLD: NORMAL

## 2021-02-05 PROCEDURE — 99233 SBSQ HOSP IP/OBS HIGH 50: CPT | Performed by: INTERNAL MEDICINE

## 2021-02-05 PROCEDURE — 86140 C-REACTIVE PROTEIN: CPT | Performed by: NURSE PRACTITIONER

## 2021-02-05 PROCEDURE — 25010000002 FUROSEMIDE PER 20 MG: Performed by: INTERNAL MEDICINE

## 2021-02-05 PROCEDURE — 85384 FIBRINOGEN ACTIVITY: CPT | Performed by: NURSE PRACTITIONER

## 2021-02-05 PROCEDURE — 80053 COMPREHEN METABOLIC PANEL: CPT | Performed by: NURSE PRACTITIONER

## 2021-02-05 PROCEDURE — 94640 AIRWAY INHALATION TREATMENT: CPT

## 2021-02-05 PROCEDURE — 63710000001 DEXAMETHASONE PER 0.25 MG: Performed by: NURSE PRACTITIONER

## 2021-02-05 PROCEDURE — 87086 URINE CULTURE/COLONY COUNT: CPT | Performed by: INTERNAL MEDICINE

## 2021-02-05 PROCEDURE — 25010000002 CEFTRIAXONE PER 250 MG: Performed by: INTERNAL MEDICINE

## 2021-02-05 PROCEDURE — 85007 BL SMEAR W/DIFF WBC COUNT: CPT | Performed by: NURSE PRACTITIONER

## 2021-02-05 PROCEDURE — 82550 ASSAY OF CK (CPK): CPT | Performed by: NURSE PRACTITIONER

## 2021-02-05 PROCEDURE — 82728 ASSAY OF FERRITIN: CPT | Performed by: NURSE PRACTITIONER

## 2021-02-05 PROCEDURE — 85025 COMPLETE CBC W/AUTO DIFF WBC: CPT | Performed by: NURSE PRACTITIONER

## 2021-02-05 PROCEDURE — 94799 UNLISTED PULMONARY SVC/PX: CPT

## 2021-02-05 PROCEDURE — 85379 FIBRIN DEGRADATION QUANT: CPT | Performed by: NURSE PRACTITIONER

## 2021-02-05 PROCEDURE — 25010000002 HEPARIN (PORCINE) PER 1000 UNITS: Performed by: INTERNAL MEDICINE

## 2021-02-05 PROCEDURE — 83615 LACTATE (LD) (LDH) ENZYME: CPT | Performed by: NURSE PRACTITIONER

## 2021-02-05 RX ORDER — GUAIFENESIN 600 MG/1
600 TABLET, EXTENDED RELEASE ORAL EVERY 12 HOURS SCHEDULED
Status: DISCONTINUED | OUTPATIENT
Start: 2021-02-05 | End: 2021-02-09 | Stop reason: HOSPADM

## 2021-02-05 RX ORDER — DIAZEPAM 2 MG/1
2 TABLET ORAL EVERY 12 HOURS PRN
Status: DISCONTINUED | OUTPATIENT
Start: 2021-02-05 | End: 2021-02-09 | Stop reason: HOSPADM

## 2021-02-05 RX ORDER — HEPARIN SODIUM 5000 [USP'U]/ML
5000 INJECTION, SOLUTION INTRAVENOUS; SUBCUTANEOUS EVERY 12 HOURS SCHEDULED
Status: DISCONTINUED | OUTPATIENT
Start: 2021-02-05 | End: 2021-02-09 | Stop reason: HOSPADM

## 2021-02-05 RX ORDER — FUROSEMIDE 10 MG/ML
20 INJECTION INTRAMUSCULAR; INTRAVENOUS ONCE
Status: COMPLETED | OUTPATIENT
Start: 2021-02-05 | End: 2021-02-05

## 2021-02-05 RX ADMIN — PANTOPRAZOLE SODIUM 40 MG: 40 TABLET, DELAYED RELEASE ORAL at 04:34

## 2021-02-05 RX ADMIN — SODIUM CHLORIDE, PRESERVATIVE FREE 10 ML: 5 INJECTION INTRAVENOUS at 08:42

## 2021-02-05 RX ADMIN — LABETALOL HYDROCHLORIDE 100 MG: 100 TABLET, FILM COATED ORAL at 16:23

## 2021-02-05 RX ADMIN — FUROSEMIDE 20 MG: 10 INJECTION, SOLUTION INTRAVENOUS at 04:33

## 2021-02-05 RX ADMIN — ALBUTEROL SULFATE 2 PUFF: 108 AEROSOL, METERED RESPIRATORY (INHALATION) at 04:24

## 2021-02-05 RX ADMIN — LABETALOL HYDROCHLORIDE 100 MG: 100 TABLET, FILM COATED ORAL at 21:35

## 2021-02-05 RX ADMIN — NYSTATIN: 100000 POWDER TOPICAL at 08:42

## 2021-02-05 RX ADMIN — NICARDIPINE HYDROCHLORIDE 5 MG/HR: 0.1 INJECTION, SOLUTION INTRAVENOUS at 03:08

## 2021-02-05 RX ADMIN — BENZONATATE 100 MG: 100 CAPSULE ORAL at 09:19

## 2021-02-05 RX ADMIN — HEPARIN SODIUM 5000 UNITS: 5000 INJECTION INTRAVENOUS; SUBCUTANEOUS at 21:35

## 2021-02-05 RX ADMIN — SODIUM CHLORIDE 1 G: 900 INJECTION INTRAVENOUS at 16:22

## 2021-02-05 RX ADMIN — NITROGLYCERIN 0.5 INCH: 20 OINTMENT TOPICAL at 02:10

## 2021-02-05 RX ADMIN — NYSTATIN: 100000 POWDER TOPICAL at 21:38

## 2021-02-05 RX ADMIN — ASPIRIN 81 MG: 81 TABLET, COATED ORAL at 08:42

## 2021-02-05 RX ADMIN — LOSARTAN POTASSIUM 100 MG: 50 TABLET, FILM COATED ORAL at 08:42

## 2021-02-05 RX ADMIN — GUAIFENESIN 600 MG: 600 TABLET, EXTENDED RELEASE ORAL at 09:19

## 2021-02-05 RX ADMIN — REMDESIVIR 100 MG: 100 INJECTION, POWDER, LYOPHILIZED, FOR SOLUTION INTRAVENOUS at 17:58

## 2021-02-05 RX ADMIN — LABETALOL 20 MG/4 ML (5 MG/ML) INTRAVENOUS SYRINGE 20 MG: at 12:36

## 2021-02-05 RX ADMIN — DEXAMETHASONE 6 MG: 2 TABLET ORAL at 08:42

## 2021-02-05 RX ADMIN — GUAIFENESIN 600 MG: 600 TABLET, EXTENDED RELEASE ORAL at 21:35

## 2021-02-05 RX ADMIN — LEVOTHYROXINE SODIUM 75 MCG: 75 TABLET ORAL at 04:33

## 2021-02-05 RX ADMIN — DIAZEPAM 2 MG: 2 TABLET ORAL at 13:49

## 2021-02-05 NOTE — NURSING NOTE
WOC consulted for: vaginal blisters    Assessment: Patient presents with blisters vs skin tear on her vagina.  Per RN, patient states she's had this for a while.     Unable to see patient at this time. But discussed with RN over the phone and asked her to take a photo.    Recommendation: Maintain good general skin care. Keep dry and apply z-guard to elizabeth area. If able, dc pure wick to avoid irritation to vagina.     WOC will follow up with patient on 2/6/21 to better assess wounds.    Discussed plan of care with RN.    Thank you for consulting WOC.  Will  Continue to  follow.  Please contact with questions or if needs arise.

## 2021-02-05 NOTE — PROGRESS NOTES
Discharge Planning Assessment  [facility]     Patient Name: Anitra Dee  MRN: [id number]  Today's Date: 2/5/2021    Admit Date: 2/4/2021    Discharge Needs Assessment     Row Name 02/05/21 1408       Living Environment    Lives With  alone    Current Living Arrangements  home/apartment/condo    Primary Care Provided by  self    Provides Primary Care For  no one    Family Caregiver if Needed  child(anuradha), adult    Family Caregiver Names  Skylar Wynn, Shawn Gabriel    Quality of Family Relationships  unable to assess    Able to Return to Prior Arrangements  yes       Resource/Environmental Concerns    Resource/Environmental Concerns  none       Transition Planning    Patient/Family Anticipates Transition to  home    Patient/Family Anticipated Services at Transition  none    Transportation Anticipated  family or friend will provide       Discharge Needs Assessment    Readmission Within the Last 30 Days  no previous admission in last 30 days    Equipment Currently Used at Home  none    Concerns to be Addressed  no discharge needs identified;denies needs/concerns at this time    Anticipated Changes Related to Illness  none    Equipment Needed After Discharge  none    Provided Post Acute Provider List?  N/A    N/A Provider List Comment  needs unknown at this time    Discharge Coordination/Progress  Home        Discharge Plan     Row Name 02/05/21 3405       Plan    Plan  Home    Patient/Family in Agreement with Plan  yes    Plan Comments  Spoke with patient via telephone regarding discharge planning.  Patient had HH 3 years ago but does not remember what agency it was and reports that she has grab bars in her bathroom but no other DME.  She denies having Pulse Oximeter, CM advised one would be provided to her at discharge.  Patient currently on 5LO2NC and receiving remdesivir.  She reports she lives alone in a single lebvel house with 2 small steps to access and denies concerns regarding home safety.  She indicates  that her son had been with her and later found out he had COVID and she subsequently exposed her daughter when she drove her to the hospital.  No immediate discharge needs verbalized.  CM following.  Patient plan is to discharge home, may need HH, with her son to transport.    Final Discharge Disposition Code  01 - home or self-care        Continued Care and Services - Admitted Since 2/4/2021    Coordination has not been started for this encounter.         Demographic Summary     Row Name 02/05/21 1402       General Information    Admission Type  inpatient    Arrived From  home    Referral Source  admission list    Reason for Consult  discharge planning    Preferred Language  English     Used During This Interaction  no    General Information Comments  Eleuterio Myers MD       Contact Information    Permission Granted to Share Info With      Contact Information Obtained for      Contact Information Comments  Skylar Wynn, daughter  [phone]  Shawn Gabriel, son  [phone]        Functional Status     Row Name 02/05/21 1407       Functional Status    Usual Activity Tolerance  good    Current Activity Tolerance  good       Functional Status, IADL    Medications  independent    Meal Preparation  independent    Housekeeping  independent    Laundry  independent    Shopping  independent       Employment/    Employment/ Comments  Medicare/[employer]        Psychosocial    No documentation.       Abuse/Neglect    No documentation.       Legal    No documentation.       Substance Abuse    No documentation.       Patient Forms    No documentation.           Basilia An, RN

## 2021-02-05 NOTE — PLAN OF CARE
Goal Outcome Evaluation:  Plan of Care Reviewed With: patient  Progress: no change  Outcome Summary: Pt admitted to room 639 around 2030 from the ED.  VS have been stable except for blood pressure, which was initially 206 systolic.  One time dose 10 mg Labetalol given with little therapeutic effect, as well as 100 mg PO Labetalol given with little therapeutic effect.  Nitroglycerin paste applied to pt's chest and brought pressure down to 190 systolic.  Nicardipine drip initiated and brought the patients blood pressure down to 134 systolic, so Nicardipine stopped.  Pt's last pressure was 159 systolic.  Initially pt was on 2L O2 NC but had to be bumped to 5L NC to maintain SPO2 > 92%.  MD made aware of pt condition multiple times throughout shift.  No complaints of pain or discomfort from the patient.  First dose Remdesivir given.  Continue POC.

## 2021-02-05 NOTE — PROGRESS NOTES
[facility] Medicine Services  PROGRESS NOTE    Patient Name: Anitra Dee  : 1935  MRN: [id number]    Date of Admission: 2021  Primary Care Physician: Eleuterio Myers MD    Subjective   Subjective     CC:  Follow up for hypoxia and COVID     HPI:  Having a terrible cough this morning, can't seem to produce any sputum. Hasn't had any cough medicine. Still short of breath.    ROS:  Gen- No fevers, chills  CV- No chest pain, palpitations  GI- No N/V/D, abd pain         Objective   Objective     Vital Signs:   Temp:  [96 °F (35.6 °C)-98 °F (36.7 °C)] 96 °F (35.6 °C)  Heart Rate:  [52-75] 63  Resp:  [16-22] 18  BP: (135-206)/(56-79) 192/75        Physical Exam:  Constitutional: No acute distress, awake, alert  HENT: NCAT, mucous membranes moist  Respiratory: has decreased breath sounds bilaterally, mildly labored respirations on 5L nasal canula   Cardiovascular: RRR, no murmurs, no lower extremity edema   Gastrointestinal: Positive bowel sounds, soft, nontender, nondistended  Psychiatric: Appropriate affect, cooperative  Neurologic: Oriented x 3, strength symmetric in all extremities, Cranial Nerves grossly intact to confrontation, speech clear  Skin: No rashes    Results Reviewed:  Results from last 7 days   Lab Units 21  0721 21  2235 21  1216   WBC 10*3/mm3 2.54*  --  4.17   HEMOGLOBIN g/dL 9.8*  --  9.0*   HEMATOCRIT % 32.9*  --  30.5*   PLATELETS 10*3/mm3 261  --  224   PROCALCITONIN ng/mL  --  0.04 0.08     Results from last 7 days   Lab Units 21  0721 21  1216   SODIUM mmol/L 138 139   POTASSIUM mmol/L 3.9 4.3   CHLORIDE mmol/L 103 103   CO2 mmol/L 24.0 28.0   BUN mg/dL 14 16   CREATININE mg/dL 0.92 1.04*   GLUCOSE mg/dL 161* 133*   CALCIUM mg/dL 8.9 8.8   ALT (SGPT) U/L 10 8   AST (SGOT) U/L 22 25   TROPONIN T ng/mL  --  <0.010   PROBNP pg/mL  --  2,336.0*     Estimated Creatinine Clearance: 43.3 mL/min (by C-G formula based on SCr of  0.92 mg/dL).    Microbiology Results Abnormal     Procedure Component Value - Date/Time    Blood Culture - Blood, Arm, Right [[id number]]  (Abnormal) Collected: 02/04/21 1305    Lab Status: Final result Specimen: Blood from Arm, Right Updated: 02/05/21 1047     Blood Culture Bacillus species     Comment: Probable contaminant requires clinical correlation, susceptibility not performed unless requested by physician.          Isolated from Aerobic Bottle     Gram Stain Aerobic Bottle Gram positive bacilli    Narrative:      Blood culture does not meet the specified criteria for PCR identification.  If pregnant, immunocompromised, or clinical concern for meningitis, call lab to run BCID for Listeria monocytogenes.    COVID PRE-OP / PRE-PROCEDURE SCREENING ORDER (NO ISOLATION) - Swab, Nasopharynx [[id number]]  (Abnormal) Collected: 02/04/21 1456    Lab Status: Final result Specimen: Swab from Nasopharynx Updated: 02/04/21 1633    Narrative:      The following orders were created for panel order COVID PRE-OP / PRE-PROCEDURE SCREENING ORDER (NO ISOLATION) - Swab, Nasopharynx.  Procedure                               Abnormality         Status                     ---------                               -----------         ------                     Respiratory Panel PCR w/...[[id number]]  Abnormal            Final result                 Please view results for these tests on the individual orders.    Respiratory Panel PCR w/COVID-19(SARS-CoV-2) [facility]/ROZ/SUZY/PAD/COR/MAD/TAD In-House, NP Swab in UTM/[facility], 3-4 HR TAT - Swab, Nasopharynx [[id number]]  (Abnormal) Collected: 02/04/21 1456    Lab Status: Final result Specimen: Swab from Nasopharynx Updated: 02/04/21 1633     ADENOVIRUS, PCR Not Detected     Coronavirus 229E Not Detected     Coronavirus HKU1 Not Detected     Coronavirus NL63 Not Detected     Coronavirus OC43 Not Detected     COVID19 Detected     Human Metapneumovirus Not Detected     Human Rhinovirus/Enterovirus Not  Detected     Influenza A PCR Not Detected     Influenza A H1 Not Detected     Influenza A H1 2009 PCR Not Detected     Influenza A H3 Not Detected     Influenza B PCR Not Detected     Parainfluenza Virus 1 Not Detected     Parainfluenza Virus 2 Not Detected     Parainfluenza Virus 3 Not Detected     Parainfluenza Virus 4 Not Detected     RSV, PCR Not Detected     Bordetella pertussis pcr Not Detected     Bordetella parapertussis PCR Not Detected     Chlamydophila pneumoniae PCR Not Detected     Mycoplasma pneumo by PCR Not Detected    Narrative:      Fact sheet for providers: https://docs.[employer]/wp-content/uploads/SFE1561-8408-OC5.1-EUA-Provider-Fact-Sheet-3.pdf    Fact sheet for patients: https://docs.[employer]/wp-content/uploads/NHW3157-9593-BK9.1-EUA-Patient-Fact-Sheet-1.pdf    Test performed by PCR.          Imaging Results (Last 24 Hours)     Procedure Component Value Units Date/Time    CT Angiogram Chest [[id number]] Collected: 02/04/21 2021     Updated: 02/04/21 2023    Narrative:      CTA Chest    INDICATION:   Respiratory failure. COVID 19 pneumonia. History of COPD.    TECHNIQUE:   CT angiogram of the chest with IV contrast. 3-D reconstructions were obtained and reviewed.   Radiation dose reduction techniques included automated exposure control or exposure modulation based on body size. Count of known CT and cardiac nuc med studies  performed in previous 12 months: 0.     COMPARISON:   None available.    FINDINGS:   Peripheral groundglass opacities most prominent within the posterior aspect right upper lobe and posterior aspects of both lower lobes. No dense consolidation. Small amount of right apical scarring. No effusions. Trachea and bronchi are unremarkable.    No evidence of pulmonary embolus. Diffuse aortic atherosclerotic changes. Cardiomegaly. Calcified right hilar nodes compatible with prior granulomatous disease. Hepatic and splenic granulomas. Multiple cholesterol gallstones but no  evidence of acute  cholecystitis.      Impression:      Multiple predominantly peripheral groundglass opacities compatible with history of COVID pneumonia.    No evidence of pulmonary embolus.    Cardiomegaly without failure.    Uncomplicated cholelithiasis.    Signer Name: SKYE Mathew MD   Signed: 2/4/2021 8:21 PM   [facility]: [id number]    [facility]    XR Chest 1 View [[id number]] Collected: 02/04/21 1226     Updated: 02/04/21 1659    Narrative:      EXAMINATION: XR CHEST 1 VW-      INDICATION: Shortness of air triage protocol.      COMPARISON: None.     FINDINGS: Single AP view of the chest reveals cardiac size borderline  enlarged. Bilateral patchy opacifications within the mid and lower lungs  consistent with airspace disease such as bronchopneumonia. No  pneumothorax or pleural effusion. Degenerative changes of the spine.           Impression:      Opacifications throughout the mid and lower lungs of a  peripheral predominance consistent with acute airspace disease such as  bronchopneumonia. No pleural effusion.     D:  02/04/2021  E:  02/04/2021     This report was finalized on 2/4/2021 4:55 PM by Dr. Anton Glass.             Results for orders placed during the hospital encounter of 08/09/19   Transthoracic Echo Complete With Contrast if Necessary Per Protocol    Narrative · Estimated EF = 65%.  · Mild mitral valve regurgitation is present  · Moderate tricuspid valve regurgitation is present.  · Calculated right ventricular systolic pressure from tricuspid   regurgitation is 51 mmHg.          I have reviewed the medications:  Scheduled Meds:aspirin, 81 mg, Oral, Daily  dexamethasone, 6 mg, Oral, Daily With Breakfast    Or  dexamethasone, 6 mg, Intravenous, Daily With Breakfast  guaiFENesin, 600 mg, Oral, Q12H  heparin (porcine), 5,000 Units, Subcutaneous, Q12H  labetalol, 100 mg, Oral, Q8H  levothyroxine, 75 mcg, Oral, Q AM  losartan, 100 mg, Oral, Daily  nystatin, ,  Topical, Q12H  pantoprazole, 40 mg, Oral, QAM  remdesivir, 100 mg, Intravenous, Q24H  sodium chloride, 500 mL, Intravenous, Once      Continuous Infusions:niCARdipine, 5-15 mg/hr, Last Rate: Stopped (02/05/21 0420)  Pharmacy Consult - Pharmacy to dose,   Pharmacy Consult - Remdesivir,       PRN Meds:.•  albuterol sulfate HFA  •  benzonatate  •  labetalol  •  nitroglycerin  •  Pharmacy Consult - Pharmacy to dose  •  Pharmacy Consult - Remdesivir  •  sodium chloride    Assessment/Plan   Assessment & Plan     Active Hospital Problems    Diagnosis  POA   • Pneumonia due to COVID-19 virus [U07.1, J12.82]  Yes   • Microcytic anemia [D50.9]  Yes   • Hypothyroid [E03.9]  Yes   • Hypertension [I10]  Yes   • GERD (gastroesophageal reflux disease) [K21.9]  Yes   • Asthma [J45.909]  Yes      Resolved Hospital Problems   No resolved problems to display.        Brief Hospital Course to date:  Anitra Dee is a 85 y.o. female  With a PMH of hypothyroidism, HTN, GERD, who was admitted on 2/4/21 for hypoxia and COVID pneumonia.    Patient and problems new to me today     Hypoxia  COVID pneumonia   -COVID detected first on 2/27 or 2/28?   -CTA chest with multiple peripheral GGO, no PE, cardiomegaly without failure and uncomplicated cholelithiasis  -On 5L nasal canula   -Started on remdesivir and dexamethasone on 2/4    Uncontrolled HTN  -On cardene drip overnight, and this morning started on losartan and labetalol at home doses  -Try to wean off Cardene drip today    UA concerning for infection  -Urine culture added to urine specimen already in lab  -Blood culture from right on 2/4 growing bacillus species, blood culture from left on 2/4 growing gram-positive cocci in groups  -We will repeat blood cultures tomorrow morning  -Start ceftriaxone IV    Anemia   -iron deficient, FOBT  ordered     Hypothyroidism   -continue levothyroxine     GERD -continue PPI    DVT Prophylaxis:  Heparin       Disposition: I expect the patient to be  discharged pending improvement in hypoxia.    CODE STATUS:   Code Status and Medical Interventions:   Ordered at: 02/04/21 1744     Code Status:    CPR     Medical Interventions (Level of Support Prior to Arrest):    Full       Sherrie Garcia MD  02/05/21

## 2021-02-05 NOTE — PLAN OF CARE
Goal Outcome Evaluation:  Plan of Care Reviewed With: patient, daughter  Progress: no change  Outcome Summary: Patient resting this shift. VSS, weaned down to 3LNC. BP elevated this afternoon, PRN IV labetalol given. Pt anxious and demanding at times with staff. WOC consulted for vaginal blister/skin tear. Pt educated on importance of keeping area clean and dry and that use of purewick is not recommended with wound. Pt requested to keep purewick.  MD aware. Pt receiving Remdesivir and IV antibiotics. PT and OT consulted, pt very weak on transfer. PRN Valium ordered and administered per orders. no complaints of pain or shortness  of breath. will continue to monitor closely.

## 2021-02-06 LAB
ALBUMIN SERPL-MCNC: 3 G/DL (ref 3.5–5.2)
ALBUMIN/GLOB SERPL: 0.8 G/DL
ALP SERPL-CCNC: 79 U/L (ref 39–117)
ALT SERPL W P-5'-P-CCNC: 8 U/L (ref 1–33)
ANION GAP SERPL CALCULATED.3IONS-SCNC: 10 MMOL/L (ref 5–15)
AST SERPL-CCNC: 21 U/L (ref 1–32)
BACTERIA SPEC AEROBE CULT: ABNORMAL
BACTERIA SPEC AEROBE CULT: NORMAL
BASOPHILS # BLD AUTO: 0.01 10*3/MM3 (ref 0–0.2)
BASOPHILS NFR BLD AUTO: 0.1 % (ref 0–1.5)
BILIRUB SERPL-MCNC: 0.2 MG/DL (ref 0–1.2)
BUN SERPL-MCNC: 28 MG/DL (ref 8–23)
BUN/CREAT SERPL: 25.9 (ref 7–25)
CALCIUM SPEC-SCNC: 9 MG/DL (ref 8.6–10.5)
CHLORIDE SERPL-SCNC: 104 MMOL/L (ref 98–107)
CK SERPL-CCNC: 66 U/L (ref 20–180)
CO2 SERPL-SCNC: 25 MMOL/L (ref 22–29)
CREAT SERPL-MCNC: 1.08 MG/DL (ref 0.57–1)
CRP SERPL-MCNC: 5.42 MG/DL (ref 0–0.5)
DEPRECATED RDW RBC AUTO: 46.3 FL (ref 37–54)
EOSINOPHIL # BLD AUTO: 0 10*3/MM3 (ref 0–0.4)
EOSINOPHIL NFR BLD AUTO: 0 % (ref 0.3–6.2)
ERYTHROCYTE [DISTWIDTH] IN BLOOD BY AUTOMATED COUNT: 16.9 % (ref 12.3–15.4)
FERRITIN SERPL-MCNC: 159.1 NG/ML (ref 13–150)
GFR SERPL CREATININE-BSD FRML MDRD: 48 ML/MIN/1.73
GLOBULIN UR ELPH-MCNC: 3.7 GM/DL
GLUCOSE SERPL-MCNC: 170 MG/DL (ref 65–99)
GRAM STN SPEC: ABNORMAL
HCT VFR BLD AUTO: 32.2 % (ref 34–46.6)
HGB BLD-MCNC: 9.6 G/DL (ref 12–15.9)
IMM GRANULOCYTES # BLD AUTO: 0.1 10*3/MM3 (ref 0–0.05)
IMM GRANULOCYTES NFR BLD AUTO: 0.9 % (ref 0–0.5)
ISOLATED FROM: ABNORMAL
LYMPHOCYTES # BLD AUTO: 0.46 10*3/MM3 (ref 0.7–3.1)
LYMPHOCYTES NFR BLD AUTO: 4.2 % (ref 19.6–45.3)
MCH RBC QN AUTO: 22.6 PG (ref 26.6–33)
MCHC RBC AUTO-ENTMCNC: 29.8 G/DL (ref 31.5–35.7)
MCV RBC AUTO: 75.9 FL (ref 79–97)
MONOCYTES # BLD AUTO: 0.24 10*3/MM3 (ref 0.1–0.9)
MONOCYTES NFR BLD AUTO: 2.2 % (ref 5–12)
NEUTROPHILS NFR BLD AUTO: 10.16 10*3/MM3 (ref 1.7–7)
NEUTROPHILS NFR BLD AUTO: 92.6 % (ref 42.7–76)
NRBC BLD AUTO-RTO: 0 /100 WBC (ref 0–0.2)
PLATELET # BLD AUTO: 298 10*3/MM3 (ref 140–450)
PMV BLD AUTO: 9.9 FL (ref 6–12)
POTASSIUM SERPL-SCNC: 4.6 MMOL/L (ref 3.5–5.2)
PROT SERPL-MCNC: 6.7 G/DL (ref 6–8.5)
RBC # BLD AUTO: 4.24 10*6/MM3 (ref 3.77–5.28)
SODIUM SERPL-SCNC: 139 MMOL/L (ref 136–145)
WBC # BLD AUTO: 10.97 10*3/MM3 (ref 3.4–10.8)

## 2021-02-06 PROCEDURE — 25010000002 CEFTRIAXONE PER 250 MG: Performed by: INTERNAL MEDICINE

## 2021-02-06 PROCEDURE — 87040 BLOOD CULTURE FOR BACTERIA: CPT | Performed by: INTERNAL MEDICINE

## 2021-02-06 PROCEDURE — 82550 ASSAY OF CK (CPK): CPT | Performed by: NURSE PRACTITIONER

## 2021-02-06 PROCEDURE — 97110 THERAPEUTIC EXERCISES: CPT

## 2021-02-06 PROCEDURE — 80053 COMPREHEN METABOLIC PANEL: CPT | Performed by: NURSE PRACTITIONER

## 2021-02-06 PROCEDURE — 86140 C-REACTIVE PROTEIN: CPT | Performed by: NURSE PRACTITIONER

## 2021-02-06 PROCEDURE — 25010000002 HEPARIN (PORCINE) PER 1000 UNITS: Performed by: INTERNAL MEDICINE

## 2021-02-06 PROCEDURE — 85025 COMPLETE CBC W/AUTO DIFF WBC: CPT | Performed by: NURSE PRACTITIONER

## 2021-02-06 PROCEDURE — 63710000001 DEXAMETHASONE PER 0.25 MG: Performed by: NURSE PRACTITIONER

## 2021-02-06 PROCEDURE — 82728 ASSAY OF FERRITIN: CPT | Performed by: NURSE PRACTITIONER

## 2021-02-06 PROCEDURE — 99232 SBSQ HOSP IP/OBS MODERATE 35: CPT | Performed by: INTERNAL MEDICINE

## 2021-02-06 PROCEDURE — 97161 PT EVAL LOW COMPLEX 20 MIN: CPT

## 2021-02-06 PROCEDURE — 94799 UNLISTED PULMONARY SVC/PX: CPT

## 2021-02-06 PROCEDURE — XW033E5 INTRODUCTION OF REMDESIVIR ANTI-INFECTIVE INTO PERIPHERAL VEIN, PERCUTANEOUS APPROACH, NEW TECHNOLOGY GROUP 5: ICD-10-PCS | Performed by: INTERNAL MEDICINE

## 2021-02-06 RX ORDER — NIFEDIPINE 30 MG/1
30 TABLET, EXTENDED RELEASE ORAL
Status: DISCONTINUED | OUTPATIENT
Start: 2021-02-06 | End: 2021-02-07

## 2021-02-06 RX ORDER — POLYETHYLENE GLYCOL 3350 17 G/17G
17 POWDER, FOR SOLUTION ORAL DAILY
Status: DISCONTINUED | OUTPATIENT
Start: 2021-02-06 | End: 2021-02-09 | Stop reason: HOSPADM

## 2021-02-06 RX ORDER — FERROUS SULFATE 325(65) MG
325 TABLET ORAL
Status: DISCONTINUED | OUTPATIENT
Start: 2021-02-06 | End: 2021-02-09 | Stop reason: HOSPADM

## 2021-02-06 RX ADMIN — LABETALOL HYDROCHLORIDE 100 MG: 100 TABLET, FILM COATED ORAL at 21:07

## 2021-02-06 RX ADMIN — HEPARIN SODIUM 5000 UNITS: 5000 INJECTION INTRAVENOUS; SUBCUTANEOUS at 21:07

## 2021-02-06 RX ADMIN — REMDESIVIR 100 MG: 100 INJECTION, POWDER, LYOPHILIZED, FOR SOLUTION INTRAVENOUS at 11:40

## 2021-02-06 RX ADMIN — FERROUS SULFATE TAB 325 MG (65 MG ELEMENTAL FE) 325 MG: 325 (65 FE) TAB at 11:40

## 2021-02-06 RX ADMIN — NIFEDIPINE 30 MG: 30 TABLET, FILM COATED, EXTENDED RELEASE ORAL at 09:35

## 2021-02-06 RX ADMIN — DEXAMETHASONE 6 MG: 2 TABLET ORAL at 07:59

## 2021-02-06 RX ADMIN — GUAIFENESIN 600 MG: 600 TABLET, EXTENDED RELEASE ORAL at 07:59

## 2021-02-06 RX ADMIN — LOSARTAN POTASSIUM 100 MG: 50 TABLET, FILM COATED ORAL at 08:00

## 2021-02-06 RX ADMIN — GUAIFENESIN 600 MG: 600 TABLET, EXTENDED RELEASE ORAL at 21:07

## 2021-02-06 RX ADMIN — NYSTATIN: 100000 POWDER TOPICAL at 08:00

## 2021-02-06 RX ADMIN — POLYETHYLENE GLYCOL 3350 17 G: 17 POWDER, FOR SOLUTION ORAL at 09:35

## 2021-02-06 RX ADMIN — ASPIRIN 81 MG: 81 TABLET, COATED ORAL at 07:58

## 2021-02-06 RX ADMIN — LABETALOL HYDROCHLORIDE 100 MG: 100 TABLET, FILM COATED ORAL at 14:12

## 2021-02-06 RX ADMIN — LABETALOL HYDROCHLORIDE 100 MG: 100 TABLET, FILM COATED ORAL at 05:06

## 2021-02-06 RX ADMIN — PANTOPRAZOLE SODIUM 40 MG: 40 TABLET, DELAYED RELEASE ORAL at 05:07

## 2021-02-06 RX ADMIN — NYSTATIN: 100000 POWDER TOPICAL at 21:08

## 2021-02-06 RX ADMIN — HEPARIN SODIUM 5000 UNITS: 5000 INJECTION INTRAVENOUS; SUBCUTANEOUS at 07:58

## 2021-02-06 RX ADMIN — SODIUM CHLORIDE 1 G: 900 INJECTION INTRAVENOUS at 16:31

## 2021-02-06 RX ADMIN — LEVOTHYROXINE SODIUM 75 MCG: 75 TABLET ORAL at 05:06

## 2021-02-06 NOTE — PLAN OF CARE
Goal Outcome Evaluation:  Plan of Care Reviewed With: patient  Progress: improving  Outcome Summary: Patient had a good day. Up to chair for most of the day. VSS, BP elevated 160's-180's. 3L humidified NC. Menjivar catheter inserted r/t vaginal wound. Skin interventions in place. Pt up with 1 assist and a walker. No complaints of pain or shortness of breath. Uses incentive spirometer. Will continue to monitor.

## 2021-02-06 NOTE — PROGRESS NOTES
[facility] Medicine Services  PROGRESS NOTE    Patient Name: Anitra Dee  : 1935  MRN: [id number]    Date of Admission: 2021  Primary Care Physician: Eleuterio Myers MD    Subjective   Subjective     CC:  Follow up for hypoxia, HTN, UTI    HPI:  No acute events overnight. Now down to 3L nasal canula. Having significant soreness in vaginal area and is requesting to have external female catheter removed. Dyspnea is improved, cough still there, but less sputum production.    ROS:  Gen- No fevers, chills  CV- No chest pain, palpitations  GI- No N/V/D, abd pain     Objective   Objective     Vital Signs:   Temp:  [96 °F (35.6 °C)-98.2 °F (36.8 °C)] 96.5 °F (35.8 °C)  Heart Rate:  [57-70] 59  Resp:  [16-18] 18  BP: (169-192)/(56-76) 185/76        Physical Exam:  Constitutional: No acute distress, awake, alert  HENT: NCAT, mucous membranes moist  Respiratory: decreased breath sounds bilaterally, respiratory effort normal on 3L nasal canula  Cardiovascular: RRR, no murmurs, rubs, or gallops  Gastrointestinal: Positive bowel sounds, soft, nontender, nondistended  Psychiatric: Appropriate affect, cooperative  Neurologic: Oriented x 3, Cranial Nerves grossly intact to confrontation, speech clear  Skin: No rashes on exposed skin     Results Reviewed:  Results from last 7 days   Lab Units 21  0637 21  0721  1216   WBC 10*3/mm3 10.97* 2.54*  --  4.17   HEMOGLOBIN g/dL 9.6* 9.8*  --  9.0*   HEMATOCRIT % 32.2* 32.9*  --  30.5*   PLATELETS 10*3/mm3 298 261  --  224   PROCALCITONIN ng/mL  --   --  0.04 0.08     Results from last 7 days   Lab Units 21  0637 21  0721 21  1216   SODIUM mmol/L 139 138 139   POTASSIUM mmol/L 4.6 3.9 4.3   CHLORIDE mmol/L 104 103 103   CO2 mmol/L 25.0 24.0 28.0   BUN mg/dL 28* 14 16   CREATININE mg/dL 1.08* 0.92 1.04*   GLUCOSE mg/dL 170* 161* 133*   CALCIUM mg/dL 9.0 8.9 8.8   ALT (SGPT) U/L 8 10 8   AST  (SGOT) U/L 21 22 25   TROPONIN T ng/mL  --   --  <0.010   PROBNP pg/mL  --   --  2,336.0*     Estimated Creatinine Clearance: 36.9 mL/min (A) (by C-G formula based on SCr of 1.08 mg/dL (H)).    Microbiology Results Abnormal     Procedure Component Value - Date/Time    Urine Culture - Urine, Urine, Catheter [[id number]] Collected: 02/05/21 1403    Lab Status: Final result Specimen: Urine, Catheter Updated: 02/06/21 1011     Urine Culture >100,000 CFU/mL Mixed Aria Isolated    Narrative:      Specimen contains mixed organisms of questionable pathogenicity which indicates contamination with commensal aria.  Further identification is unlikely to provide clinically useful information.  Suggest recollection.    Blood Culture - Blood, Arm, Left [[id number]]  (Abnormal) Collected: 02/04/21 1310    Lab Status: Final result Specimen: Blood from Arm, Left Updated: 02/06/21 0638     Blood Culture Staphylococcus, coagulase negative     Comment: Probable contaminant requires clinical correlation, susceptibility not performed unless requested by physician.          Isolated from Aerobic Bottle     Gram Stain Aerobic Bottle Gram positive cocci in groups    Blood Culture ID, PCR - Blood, Arm, Left [[id number]]  (Abnormal) Collected: 02/04/21 1310    Lab Status: Final result Specimen: Blood from Arm, Left Updated: 02/05/21 1334     BCID, PCR Staphylococcus spp, not aureus. Identification by BCID PCR.    Blood Culture - Blood, Arm, Right [[id number]]  (Abnormal) Collected: 02/04/21 1305    Lab Status: Final result Specimen: Blood from Arm, Right Updated: 02/05/21 1047     Blood Culture Bacillus species     Comment: Probable contaminant requires clinical correlation, susceptibility not performed unless requested by physician.          Isolated from Aerobic Bottle     Gram Stain Aerobic Bottle Gram positive bacilli    Narrative:      Blood culture does not meet the specified criteria for PCR identification.  If pregnant,  immunocompromised, or clinical concern for meningitis, call lab to run BCID for Listeria monocytogenes.    COVID PRE-OP / PRE-PROCEDURE SCREENING ORDER (NO ISOLATION) - Swab, Nasopharynx [[id number]]  (Abnormal) Collected: 02/04/21 1456    Lab Status: Final result Specimen: Swab from Nasopharynx Updated: 02/04/21 1633    Narrative:      The following orders were created for panel order COVID PRE-OP / PRE-PROCEDURE SCREENING ORDER (NO ISOLATION) - Swab, Nasopharynx.  Procedure                               Abnormality         Status                     ---------                               -----------         ------                     Respiratory Panel PCR w/...[[id number]]  Abnormal            Final result                 Please view results for these tests on the individual orders.    Respiratory Panel PCR w/COVID-19(SARS-CoV-2) [facility]/RO[facility]/SUZY/[facility]/MAD/TAD In-House, NP Swab in UTM/VTM, 3-4 HR TAT - Swab, Nasopharynx [[id number]]  (Abnormal) Collected: 02/04/21 1456    Lab Status: Final result Specimen: Swab from Nasopharynx Updated: 02/04/21 1633     ADENOVIRUS, PCR Not Detected     Coronavirus 229E Not Detected     Coronavirus HKU1 Not Detected     Coronavirus NL63 Not Detected     Coronavirus OC43 Not Detected     COVID19 Detected     Human Metapneumovirus Not Detected     Human Rhinovirus/Enterovirus Not Detected     Influenza A PCR Not Detected     Influenza A H1 Not Detected     Influenza A H1 2009 PCR Not Detected     Influenza A H3 Not Detected     Influenza B PCR Not Detected     Parainfluenza Virus 1 Not Detected     Parainfluenza Virus 2 Not Detected     Parainfluenza Virus 3 Not Detected     Parainfluenza Virus 4 Not Detected     RSV, PCR Not Detected     Bordetella pertussis pcr Not Detected     Bordetella parapertussis PCR Not Detected     Chlamydophila pneumoniae PCR Not Detected     Mycoplasma pneumo by PCR Not Detected    Narrative:      Fact sheet for providers:  https://docs.[employer]/wp-content/uploads/EJH9412-0446-AP1.1-EUA-Provider-Fact-Sheet-3.pdf    Fact sheet for patients: https://docs.[employer]/wp-content/uploads/GVK3525-4250-CT2.1-EUA-Patient-Fact-Sheet-1.pdf    Test performed by PCR.          Imaging Results (Last 24 Hours)     ** No results found for the last 24 hours. **          Results for orders placed during the hospital encounter of 08/09/19   Transthoracic Echo Complete With Contrast if Necessary Per Protocol    Narrative · Estimated EF = 65%.  · Mild mitral valve regurgitation is present  · Moderate tricuspid valve regurgitation is present.  · Calculated right ventricular systolic pressure from tricuspid   regurgitation is 51 mmHg.          I have reviewed the medications:  Scheduled Meds:aspirin, 81 mg, Oral, Daily  cefTRIAXone, 1 g, Intravenous, Q24H  dexamethasone, 6 mg, Oral, Daily With Breakfast    Or  dexamethasone, 6 mg, Intravenous, Daily With Breakfast  guaiFENesin, 600 mg, Oral, Q12H  heparin (porcine), 5,000 Units, Subcutaneous, Q12H  labetalol, 100 mg, Oral, Q8H  levothyroxine, 75 mcg, Oral, Q AM  losartan, 100 mg, Oral, Daily  NIFEdipine XL, 30 mg, Oral, Q24H  nystatin, , Topical, Q12H  pantoprazole, 40 mg, Oral, QAM  polyethylene glycol, 17 g, Oral, Daily  remdesivir, 100 mg, Intravenous, Q24H  sodium chloride, 500 mL, Intravenous, Once      Continuous Infusions:niCARdipine, 5-15 mg/hr, Last Rate: Stopped (02/05/21 0420)  Pharmacy Consult - Remdesivir,       PRN Meds:.•  albuterol sulfate HFA  •  benzonatate  •  diazePAM  •  labetalol  •  nitroglycerin  •  Pharmacy Consult - Remdesivir  •  sodium chloride    Assessment/Plan   Assessment & Plan     Active Hospital Problems    Diagnosis  POA   • Pneumonia due to COVID-19 virus [U07.1, J12.82]  Yes   • Microcytic anemia [D50.9]  Yes   • Hypothyroid [E03.9]  Yes   • Hypertension [I10]  Yes   • GERD (gastroesophageal reflux disease) [K21.9]  Yes   • Asthma [J45.909]  Yes      Resolved  Hospital Problems   No resolved problems to display.        Brief Hospital Course to date:  Anitra Dee is a 85 y.o. female With a PMH of hypothyroidism, HTN, GERD, who was admitted on 2/4/21 for hypoxia and COVID pneumonia.     Hypoxia  COVID pneumonia   -COVID detected first on 2/28  -CTA chest with multiple peripheral GGO, no PE, cardiomegaly without failure and uncomplicated cholelithiasis  -On 3L nasal canula today, continue to wean off supplementaloxygen  -Started on remdesivir and dexamethasone on 2/4   -Continue to monitor inflammatory markers     Uncontrolled HTN  -Continue losartan and labetalol at home doses, started on nifedipine today    UA concerning for infection  -Urine culture added to urine specimen already in lab  -Blood culture from right on 2/4 growing bacillus species  -Blood culture from left on 2/4 growing gram-positive cocci in groups -this was likely contaminant  -We will repeat blood cultures today  -Continue ceftriaxone IV and follow-up culture results     Anemia   -iron deficient, FOBT  negative  -Start iron supplement     Hypothyroidism   -continue levothyroxine      GERD -continue PPI     DVT Prophylaxis:  Heparin         Disposition: I expect the patient to be discharged pending improvement in hypoxia.      CODE STATUS:   Code Status and Medical Interventions:   Ordered at: 02/04/21 1744     Code Status:    CPR     Medical Interventions (Level of Support Prior to Arrest):    Full       Sherrie Garcia MD  02/06/21

## 2021-02-06 NOTE — THERAPY EVALUATION
Patient Name: Anitra Dee  : 1935    MRN: [id number]                              Today's Date: 2021       Admit Date: 2021    Visit Dx:     ICD-10-CM ICD-9-CM   1. Pneumonia due to COVID-19 virus  U07.1 480.8    J12.82 079.89   2. Hypoxia  R09.02 799.02     Patient Active Problem List   Diagnosis   • Post menopausal syndrome   • Pneumonia   • Hypothyroid   • Hypertension   • GERD (gastroesophageal reflux disease)   • Asthma   • Arthritis   • Microcytic anemia   • Pneumonia due to COVID-19 virus     Past Medical History:   Diagnosis Date   • Arthritis    • Asthma    • COVID-19    • GERD (gastroesophageal reflux disease)    • Hypertension    • Hypothyroid    • Pneumonia    • Post menopausal syndrome      Past Surgical History:   Procedure Laterality Date   • HYSTERECTOMY     • KNEE ARTHROPLASTY     • REPAIR PELVIC FLOOR DEFECT VAGINAL APPROACH W/ MESH     • SHOULDER ARTHROTOMY       General Information     Row Name 21 1110          Physical Therapy Time and Intention    Document Type  evaluation  -NS     Mode of Treatment  individual therapy;physical therapy  -NS     Row Name 21 1110          General Information    Patient Profile Reviewed  yes  -NS     Prior Level of Function  independent:;all household mobility;community mobility;gait;transfer;bed mobility;ADL's;driving  -NS     Existing Precautions/Restrictions  fall;oxygen therapy device and L/min  -NS     Barriers to Rehab  none identified  -NS     Row Name 21 1110          Living Environment    Lives With  alone  -NS     Row Name 21 1110          Home Main Entrance    Number of Stairs, Main Entrance  two  -NS     Stair Railings, Main Entrance  railing on right side (ascending)  -NS     Row Name 21 1110          Stairs Within Home, Primary    Number of Stairs, Within Home, Primary  none  -NS     Row Name 21 1110          Cognition    Orientation Status (Cognition)  oriented x 4  -NS     Row Name 21  1110          Safety Issues, Functional Mobility    Safety Issues Affecting Function (Mobility)  insight into deficits/self-awareness;safety precaution awareness;safety precautions follow-through/compliance;judgment  -NS     Impairments Affecting Function (Mobility)  balance;coordination;endurance/activity tolerance;strength;shortness of breath;pain  -NS       User Key  (r) = Recorded By, (t) = Taken By, (c) = Cosigned By    Initials Name Provider Type    Shaina Villalba PT Physical Therapist        Mobility     Row Name 02/06/21 1110          Bed Mobility    Comment (Bed Mobility)  Not assessed; pt UIC at start and end of eval.  -NS     Row Name 02/06/21 1110          Transfers    Comment (Transfers)  No unsteadiness upon standing.  -NS     Row Name 02/06/21 1110          Sit-Stand Transfer    Sit-Stand [place] (Transfers)  contact guard  -NS     Assistive Device (Sit-Stand Transfers)  other (see comments) gait belt  -NS     Row Name 02/06/21 1110          Gait/Stairs (Locomotion)    [place] Level (Gait)  contact guard  -NS     Assistive Device (Gait)  other (see comments) gait belt  -NS     Distance in Feet (Gait)  30  -NS     Deviations/Abnormal Patterns (Gait)  base of support, narrow;umair decreased;gait speed decreased;stride length decreased  -NS     Bilateral Gait Deviations  weight shift ability decreased;heel strike decreased  -NS     Comment (Gait/Stairs)  Patient ambulated around room with unsteadiness, demonstrating narrow CARMEN and decreased balance upon weight shifting to each side. Pt encouraged to trial RW but adamantely refused. Pt able to correct several small losses of balance, stating that she just feels weak. Will attempt to encourage RW use again during next session. Noted O2 desat to 97% on 3L O2, requiring 1 min to raise to 90%.  -NS       User Key  (r) = Recorded By, (t) = Taken By, (c) = Cosigned By    Initials Name Provider Type    Shaina Villalba PT Physical Therapist         Obj/Interventions     [facility] [place] Name 02/06/21 1110          Range of Motion Comprehensive    General Range of Motion  bilateral lower extremity ROM WFL  -NS     [facility] [place] Name 02/06/21 1110          Strength Comprehensive (MMT)    General Manual Muscle Testing (MMT) Assessment  lower extremity strength deficits identified  -NS     Comment, General Manual Muscle Testing (MMT) Assessment  BLEs: grossly 4/5  -NS     [facility] [place] Name 02/06/21 1110          Motor Skills    Motor Skills  functional endurance  -NS     Functional Endurance  Pulmonary set: scap retraction, shoulder flexion, shoulder ABD/ADD x10 with cues for appropriate breathing.  -NS     [facility] [place] Name 02/06/21 1110          Balance    Balance Assessment  sitting static balance;sitting dynamic balance;standing static balance;standing dynamic balance  -NS     Static Sitting Balance  WFL;unsupported;sitting in chair  -NS     Dynamic Sitting Balance  WFL;unsupported;sitting in chair  -NS     Static Standing Balance  WFL;unsupported;standing  -NS     Dynamic Standing Balance  mild impairment;unsupported;standing  -NS     Row Name 02/06/21 1110          Sensory Assessment (Somatosensory)    Sensory Assessment (Somatosensory)  LE sensation intact  -NS       User Key  (r) = Recorded By, (t) = Taken By, (c) = Cosigned By    Initials Name Provider Type    Shaina Villalba, PT Physical Therapist        Goals/Plan     [facility] [place] Name 02/06/21 1110          Bed Mobility Goal 1 (PT)    Activity/Assistive Device (Bed Mobility Goal 1, PT)  sit to supine/supine to sit  -NS     [place] Level/Cues Needed (Bed Mobility Goal 1, PT)  independent  -NS     Time Frame (Bed Mobility Goal 1, PT)  long term goal (LTG);2 weeks  -NS     [facility] [place] Name 02/06/21 1110          Transfer Goal 1 (PT)    Activity/Assistive Device (Transfer Goal 1, PT)  sit-to-stand/stand-to-sit;bed-to-chair/chair-to-bed  -NS     [place] Level/Cues Needed (Transfer Goal 1, PT)  independent  -NS     Time Frame (Transfer Goal 1, PT)   long term goal (LTG);2 weeks  -NS     Row Name 02/06/21 1110          Gait Training Goal 1 (PT)    Activity/Assistive Device (Gait Training Goal 1, PT)  gait (walking locomotion);assistive device use  -NS     [place] Level (Gait Training Goal 1, PT)  modified independence  -NS     Distance (Gait Training Goal 1, PT)  150  -NS     Time Frame (Gait Training Goal 1, PT)  long term goal (LTG);2 weeks  -NS     Row Name 02/06/21 1110          Stairs Goal 1 (PT)    Activity/Assistive Device (Stairs Goal 1, PT)  ascending stairs;descending stairs  -NS     [place] Level/Cues Needed (Stairs Goal 1, PT)  supervision required  -NS     Number of Stairs (Stairs Goal 1, PT)  2  -NS     Time Frame (Stairs Goal 1, PT)  long term goal (LTG);2 weeks  -NS       User Key  (r) = Recorded By, (t) = Taken By, (c) = Cosigned By    Initials Name Provider Type    Shaina Villalba, PT Physical Therapist        Clinical Impression     Row Name 02/06/21 1110          Pain    Additional Documentation  Pain Scale: Numbers Pre/Post-Treatment (Group)  -NS     Row Name 02/06/21 1110          Pain Scale: Numbers Pre/Post-Treatment    Pretreatment Pain Rating  5/10  -NS     Posttreatment Pain Rating  5/10  -NS     Pain Location - Orientation  lower  -NS     Pain Location  other (see comments) vaginal. RN notified  -NS     Pain Intervention(s)  Repositioned;Ambulation/increased activity  -NS     Row Name 02/06/21 1110          Plan of Care Review    Plan of Care Reviewed With  patient  -NS     Progress  no change PT eval  -NS     Outcome Summary  Patient presents with generalized weakness, poor balance, and decreased activity tolerance affecting functional mobility. She ambulated 30ft without AD with CGA, demonstrating narrow CARMEN and unsteadiness with gait, declining use of RW despite education. Noted O2 desat to 87% on 3L O2, able to raise to 90% within 1 min. Skilled IP PT warranted. Pt would benefit from SNF at discharge based on current  level of function, but will continue to monitor progress closely.  -NS     Row Name 02/06/21 1110          Therapy Assessment/Plan (PT)    Patient/Family Therapy Goals Statement (PT)  To have less pain.  -NS     Rehab Potential (PT)  good, to achieve stated therapy goals  -NS     Criteria for Skilled Interventions Met (PT)  yes;meets criteria;skilled treatment is necessary  -NS     Predicted Duration of Therapy Intervention (PT)  2 weeks  -NS     Row Name 02/06/21 1110          Vital Signs    Pre Systolic BP Rehab  163  -NS     Pre Treatment Diastolic BP  70  -NS     Pretreatment Heart Rate (beats/min)  63  -NS     Posttreatment Heart Rate (beats/min)  60  -NS     Pre SpO2 (%)  95  -NS     O2 Delivery Pre Treatment  nasal cannula  -NS     Intra SpO2 (%)  87  -NS     O2 Delivery Intra Treatment  nasal cannula  -NS     Post SpO2 (%)  95  -NS     O2 Delivery Post Treatment  nasal cannula  -NS     Pre Patient Position  Sitting  -NS     Intra Patient Position  Standing  -NS     Post Patient Position  Sitting  -NS     Row Name 02/06/21 1110          Positioning and Restraints    Pre-Treatment Position  sitting in chair/recliner  -NS     Post Treatment Position  chair  -NS     In Chair  notified nsg;reclined;call light within reach;encouraged to call for assist;exit alarm on;legs elevated;waffle cushion;with nsg  -NS       User Key  (r) = Recorded By, (t) = Taken By, (c) = Cosigned By    Initials Name Provider Type    Shaina Villalba, PT Physical Therapist        Outcome Measures     Row Name 02/06/21 1110          How much help from another person do you currently need...    Turning from your back to your side while in flat bed without using bedrails?  3  -NS     Moving from lying on back to sitting on the side of a flat bed without bedrails?  3  -NS     Moving to and from a bed to a chair (including a wheelchair)?  3  -NS     Standing up from a chair using your arms (e.g., wheelchair, bedside chair)?  3  -NS      Climbing 3-5 steps with a railing?  2  -NS     To walk in hospital room?  3  -NS     AM-PAC 6 Clicks Score (PT)  17  -NS     Row Name 02/06/21 1110          Functional Assessment    Outcome Measure Options  AM-PAC 6 Clicks Basic Mobility (PT)  -NS       User Key  (r) = Recorded By, (t) = Taken By, (c) = Cosigned By    Initials Name Provider Type    NS Shaina Mathew PT Physical Therapist        Physical Therapy Education                 Title: PT OT SLP Therapies (In Progress)     Topic: Physical Therapy (In Progress)     Point: Mobility training (In Progress)     Learning Progress Summary           Patient Acceptance, E, NR by NS at 2/6/2021 1202    Comment: Patient was educated about PT POC, benefits of using AD, PLB, and pulmonary exercises.                   Point: Home exercise program (In Progress)     Learning Progress Summary           Patient Acceptance, E, NR by NS at 2/6/2021 1202    Comment: Patient was educated about PT POC, benefits of using AD, PLB, and pulmonary exercises.                   Point: Body mechanics (In Progress)     Learning Progress Summary           Patient Acceptance, E, NR by NS at 2/6/2021 1202    Comment: Patient was educated about PT POC, benefits of using AD, PLB, and pulmonary exercises.                   Point: Precautions (In Progress)     Learning Progress Summary           Patient Acceptance, E, NR by NS at 2/6/2021 1202    Comment: Patient was educated about PT POC, benefits of using AD, PLB, and pulmonary exercises.                               User Key     Initials Effective Dates Name Provider Type Discipline    NS 09/10/19 -  Shaina Mathew PT Physical Therapist PT              PT Recommendation and Plan  Planned Therapy Interventions (PT): balance training, bed mobility training, gait training, home exercise program, patient/family education, neuromuscular re-education, strengthening, transfer training, stair training  Plan of Care Reviewed With: patient  Progress:  no change(PT eval)  Outcome Summary: Patient presents with generalized weakness, poor balance, and decreased activity tolerance affecting functional mobility. She ambulated 30ft without AD with CGA, demonstrating narrow CARMEN and unsteadiness with gait, declining use of RW despite education. Noted O2 desat to 87% on 3L O2, able to raise to 90% within 1 min. Skilled IP PT warranted. Pt would benefit from SNF at discharge based on current level of function, but will continue to monitor progress closely.     Time Calculation:   PT Charges     Row Name 02/06/21 1110             Time Calculation    Start Time  1110  -NS      PT Received On  02/06/21  -NS      PT Goal Re-Cert Due Date  02/16/21  -NS         Timed Charges    36847 - PT Therapeutic Exercise Minutes  10  -NS        User Key  (r) = Recorded By, (t) = Taken By, (c) = Cosigned By    Initials Name Provider Type    Shaina Villalba, PT Physical Therapist        Therapy Charges for Today     Code Description Service Date Service Provider Modifiers Qty    55525608235 HC PT EVAL LOW COMPLEXITY 3 2/6/2021 Shaina Mathew, PT GP 1    09061866188 HC PT THER PROC EA 15 MIN 2/6/2021 Shaina Mathew, PT GP 1          PT G-Codes  Outcome Measure Options: AM-PAC 6 Clicks Basic Mobility (PT)  AM-PAC 6 Clicks Score (PT): 17    Shaina Mathew PT  2/6/2021

## 2021-02-06 NOTE — NURSING NOTE
[facility] follow-up:     Vagina blistering     Patient is followed by Dr. Chapman and had InterStim placement on 1/20/2021 r/t urge incontinence     At this time presents with vaginal ulceration.   Wound bed is red and tender.   Some vaginal swelling noted.     Removed Prima-fit r/t irritation.  Please do not place a new one.     Dry flow pads only.   Using a Menjivar cath might be beneficial.     Continue with good general skin care, elizabeth care, keep area as dry as able, and use barrier cream.     Thanks

## 2021-02-06 NOTE — PLAN OF CARE
Goal Outcome Evaluation:  Plan of Care Reviewed With: patient  Progress: no change  Outcome Summary: VSS. Pt on 3L NC. Denies any complaints or needs. Will conttinue to monitor.

## 2021-02-06 NOTE — PLAN OF CARE
Goal Outcome Evaluation:  Plan of Care Reviewed With: patient  Progress: no change(PT eval)  Outcome Summary: Patient presents with generalized weakness, poor balance, and decreased activity tolerance affecting functional mobility. She ambulated 30ft without AD with CGA, demonstrating narrow CARMEN and unsteadiness with gait, declining use of RW despite education. Noted O2 desat to 87% on 3L O2, able to raise to 90% within 1 min. Skilled IP PT warranted. Pt would benefit from SNF at discharge based on current level of function, but will continue to monitor progress closely.

## 2021-02-07 LAB
ALBUMIN SERPL-MCNC: 2.8 G/DL (ref 3.5–5.2)
ALBUMIN/GLOB SERPL: 0.8 G/DL
ALP SERPL-CCNC: 78 U/L (ref 39–117)
ALT SERPL W P-5'-P-CCNC: 10 U/L (ref 1–33)
ANION GAP SERPL CALCULATED.3IONS-SCNC: 9 MMOL/L (ref 5–15)
AST SERPL-CCNC: 19 U/L (ref 1–32)
BASOPHILS # BLD MANUAL: 0 10*3/MM3 (ref 0–0.2)
BASOPHILS NFR BLD AUTO: 0 % (ref 0–1.5)
BILIRUB SERPL-MCNC: 0.2 MG/DL (ref 0–1.2)
BUN SERPL-MCNC: 47 MG/DL (ref 8–23)
BUN/CREAT SERPL: 43.5 (ref 7–25)
CALCIUM SPEC-SCNC: 8.8 MG/DL (ref 8.6–10.5)
CHLORIDE SERPL-SCNC: 104 MMOL/L (ref 98–107)
CK SERPL-CCNC: 42 U/L (ref 20–180)
CO2 SERPL-SCNC: 23 MMOL/L (ref 22–29)
CREAT SERPL-MCNC: 1.08 MG/DL (ref 0.57–1)
CRP SERPL-MCNC: 2.34 MG/DL (ref 0–0.5)
D DIMER PPP FEU-MCNC: 0.71 MCGFEU/ML (ref 0–0.56)
DEPRECATED RDW RBC AUTO: 47.1 FL (ref 37–54)
EOSINOPHIL # BLD MANUAL: 0 10*3/MM3 (ref 0–0.4)
EOSINOPHIL NFR BLD MANUAL: 0 % (ref 0.3–6.2)
ERYTHROCYTE [DISTWIDTH] IN BLOOD BY AUTOMATED COUNT: 16.9 % (ref 12.3–15.4)
FERRITIN SERPL-MCNC: 141.9 NG/ML (ref 13–150)
FIBRINOGEN PPP-MCNC: 418 MG/DL (ref 215–430)
GFR SERPL CREATININE-BSD FRML MDRD: 48 ML/MIN/1.73
GLOBULIN UR ELPH-MCNC: 3.4 GM/DL
GLUCOSE SERPL-MCNC: 255 MG/DL (ref 65–99)
HCT VFR BLD AUTO: 30.7 % (ref 34–46.6)
HGB BLD-MCNC: 9.1 G/DL (ref 12–15.9)
HYPOCHROMIA BLD QL: ABNORMAL
LARGE PLATELETS: ABNORMAL
LDH SERPL-CCNC: 308 U/L (ref 135–214)
LYMPHOCYTES # BLD MANUAL: 0.38 10*3/MM3 (ref 0.7–3.1)
LYMPHOCYTES NFR BLD MANUAL: 2 % (ref 5–12)
LYMPHOCYTES NFR BLD MANUAL: 3 % (ref 19.6–45.3)
MCH RBC QN AUTO: 22.8 PG (ref 26.6–33)
MCHC RBC AUTO-ENTMCNC: 29.6 G/DL (ref 31.5–35.7)
MCV RBC AUTO: 76.8 FL (ref 79–97)
METAMYELOCYTES NFR BLD MANUAL: 1 % (ref 0–0)
MICROCYTES BLD QL: ABNORMAL
MONOCYTES # BLD AUTO: 0.25 10*3/MM3 (ref 0.1–0.9)
NEUTROPHILS # BLD AUTO: 11.64 10*3/MM3 (ref 1.7–7)
NEUTROPHILS NFR BLD MANUAL: 90 % (ref 42.7–76)
NEUTS BAND NFR BLD MANUAL: 3 % (ref 0–5)
OVALOCYTES BLD QL SMEAR: ABNORMAL
PLATELET # BLD AUTO: 326 10*3/MM3 (ref 140–450)
PMV BLD AUTO: 10 FL (ref 6–12)
POLYCHROMASIA BLD QL SMEAR: ABNORMAL
POTASSIUM SERPL-SCNC: 4.7 MMOL/L (ref 3.5–5.2)
PROT SERPL-MCNC: 6.2 G/DL (ref 6–8.5)
RBC # BLD AUTO: 4 10*6/MM3 (ref 3.77–5.28)
SODIUM SERPL-SCNC: 136 MMOL/L (ref 136–145)
VARIANT LYMPHS NFR BLD MANUAL: 1 % (ref 0–5)
WBC # BLD AUTO: 12.52 10*3/MM3 (ref 3.4–10.8)
WBC MORPH BLD: NORMAL

## 2021-02-07 PROCEDURE — 86140 C-REACTIVE PROTEIN: CPT | Performed by: NURSE PRACTITIONER

## 2021-02-07 PROCEDURE — 63710000001 DEXAMETHASONE PER 0.25 MG: Performed by: NURSE PRACTITIONER

## 2021-02-07 PROCEDURE — 25010000002 HEPARIN (PORCINE) PER 1000 UNITS: Performed by: INTERNAL MEDICINE

## 2021-02-07 PROCEDURE — 80053 COMPREHEN METABOLIC PANEL: CPT | Performed by: NURSE PRACTITIONER

## 2021-02-07 PROCEDURE — 82728 ASSAY OF FERRITIN: CPT | Performed by: NURSE PRACTITIONER

## 2021-02-07 PROCEDURE — 85007 BL SMEAR W/DIFF WBC COUNT: CPT | Performed by: NURSE PRACTITIONER

## 2021-02-07 PROCEDURE — 85379 FIBRIN DEGRADATION QUANT: CPT | Performed by: NURSE PRACTITIONER

## 2021-02-07 PROCEDURE — 85025 COMPLETE CBC W/AUTO DIFF WBC: CPT | Performed by: NURSE PRACTITIONER

## 2021-02-07 PROCEDURE — 82550 ASSAY OF CK (CPK): CPT | Performed by: NURSE PRACTITIONER

## 2021-02-07 PROCEDURE — 83615 LACTATE (LD) (LDH) ENZYME: CPT | Performed by: NURSE PRACTITIONER

## 2021-02-07 PROCEDURE — 85384 FIBRINOGEN ACTIVITY: CPT | Performed by: NURSE PRACTITIONER

## 2021-02-07 PROCEDURE — 99232 SBSQ HOSP IP/OBS MODERATE 35: CPT | Performed by: INTERNAL MEDICINE

## 2021-02-07 PROCEDURE — 25010000002 CEFTRIAXONE PER 250 MG: Performed by: INTERNAL MEDICINE

## 2021-02-07 PROCEDURE — 94799 UNLISTED PULMONARY SVC/PX: CPT

## 2021-02-07 RX ADMIN — PANTOPRAZOLE SODIUM 40 MG: 40 TABLET, DELAYED RELEASE ORAL at 05:03

## 2021-02-07 RX ADMIN — HEPARIN SODIUM 5000 UNITS: 5000 INJECTION INTRAVENOUS; SUBCUTANEOUS at 21:24

## 2021-02-07 RX ADMIN — NYSTATIN: 100000 POWDER TOPICAL at 21:24

## 2021-02-07 RX ADMIN — GUAIFENESIN 600 MG: 600 TABLET, EXTENDED RELEASE ORAL at 21:24

## 2021-02-07 RX ADMIN — SODIUM CHLORIDE, PRESERVATIVE FREE 10 ML: 5 INJECTION INTRAVENOUS at 08:44

## 2021-02-07 RX ADMIN — LEVOTHYROXINE SODIUM 75 MCG: 75 TABLET ORAL at 05:03

## 2021-02-07 RX ADMIN — GUAIFENESIN 600 MG: 600 TABLET, EXTENDED RELEASE ORAL at 08:43

## 2021-02-07 RX ADMIN — DIAZEPAM 2 MG: 2 TABLET ORAL at 21:24

## 2021-02-07 RX ADMIN — REMDESIVIR 100 MG: 100 INJECTION, POWDER, LYOPHILIZED, FOR SOLUTION INTRAVENOUS at 12:55

## 2021-02-07 RX ADMIN — DEXAMETHASONE 6 MG: 2 TABLET ORAL at 08:43

## 2021-02-07 RX ADMIN — FERROUS SULFATE TAB 325 MG (65 MG ELEMENTAL FE) 325 MG: 325 (65 FE) TAB at 08:43

## 2021-02-07 RX ADMIN — LABETALOL HYDROCHLORIDE 100 MG: 100 TABLET, FILM COATED ORAL at 21:24

## 2021-02-07 RX ADMIN — NYSTATIN: 100000 POWDER TOPICAL at 08:45

## 2021-02-07 RX ADMIN — SODIUM CHLORIDE 1 G: 900 INJECTION INTRAVENOUS at 16:30

## 2021-02-07 RX ADMIN — ASPIRIN 81 MG: 81 TABLET, COATED ORAL at 08:43

## 2021-02-07 RX ADMIN — HEPARIN SODIUM 5000 UNITS: 5000 INJECTION INTRAVENOUS; SUBCUTANEOUS at 08:43

## 2021-02-07 RX ADMIN — LABETALOL HYDROCHLORIDE 100 MG: 100 TABLET, FILM COATED ORAL at 05:03

## 2021-02-07 NOTE — PLAN OF CARE
Goal Outcome Evaluation:     Progress: improving   VSS, on 2L NC. Voiding well via fabian. No C/o pain or discomfort. Pt states feeling better, eager to get home. Slept well.

## 2021-02-07 NOTE — PLAN OF CARE
"Goal Outcome Evaluation:        Outcome Summary: VSS, weaned to 2.5LNC. Patient encouraged to use incentive spirometer, reeducated about need for consistent use. Remdesivir and dexamethasone ongoing. No complaints of pain. Menjivar catheter in place, patent.  Patient refused all blood pressure meds, since \"too low blood pressure cause stroke\", reeducated about need to control hypertension to prevent stroke. Patient needing reeducation about frequent turning regarding skin interventions. Airborne and contact precautions maintained. Will continue to monitor.   "

## 2021-02-07 NOTE — PROGRESS NOTES
[facility] Medicine Services  PROGRESS NOTE    Patient Name: Anitra Dee  : 1935  MRN: [id number]    Date of Admission: 2021  Primary Care Physician: Eleuterio Myers MD    Subjective   Subjective     CC:  Follow up hypoxia, UTI, COVID pneumonia    HPI:  Feeling lightheaded this morning, thinks it's because her blood pressure is low. Still dyspneic and finding it hard to take deep breaths. Coughing up brownish sputum.    ROS:  Gen- No fevers, chills  CV- No chest pain, palpitations  GI- No N/V/D, abd pain     Objective   Objective     Vital Signs:   Temp:  [96.5 °F (35.8 °C)-97.7 °F (36.5 °C)] 96.9 °F (36.1 °C)  Heart Rate:  [60-72] 64  Resp:  [18-20] 20  BP: (105-178)/(43-71) 105/43        Physical Exam:  Constitutional: No acute distress, awake, alert  HENT: NCAT, mucous membranes moist  Respiratory: diminished breath sounds bilaterally, respiratory effort normal on 3L nasal canula  Cardiovascular: RRR, no murmurs, no lower extremity edema  Gastrointestinal: Positive bowel sounds, soft, nontender, nondistended  : has fabian catheter in   Psychiatric: Appropriate affect, cooperative  Neurologic: Oriented x 3, strength symmetric in all extremities, Cranial Nerves grossly intact to confrontation, speech clear  Skin: No rashes    Results Reviewed:  Results from last 7 days   Lab Units 21  0637 21  07215 21  1216   WBC 10*3/mm3 10.97* 2.54*  --  4.17   HEMOGLOBIN g/dL 9.6* 9.8*  --  9.0*   HEMATOCRIT % 32.2* 32.9*  --  30.5*   PLATELETS 10*3/mm3 298 261  --  224   PROCALCITONIN ng/mL  --   --  0.04 0.08     Results from last 7 days   Lab Units 21  0637 21  0721 21  1216   SODIUM mmol/L 139 138 139   POTASSIUM mmol/L 4.6 3.9 4.3   CHLORIDE mmol/L 104 103 103   CO2 mmol/L 25.0 24.0 28.0   BUN mg/dL 28* 14 16   CREATININE mg/dL 1.08* 0.92 1.04*   GLUCOSE mg/dL 170* 161* 133*   CALCIUM mg/dL 9.0 8.9 8.8   ALT (SGPT) U/L 8 10 8    AST (SGOT) U/L 21 22 25   TROPONIN T ng/mL  --   --  <0.010   PROBNP pg/mL  --   --  2,336.0*     Estimated Creatinine Clearance: 36.9 mL/min (A) (by C-G formula based on SCr of 1.08 mg/dL (H)).    Microbiology Results Abnormal     Procedure Component Value - Date/Time    Blood Culture - Blood, Arm, Left [[id number]] Collected: 02/06/21 0819    Lab Status: Preliminary result Specimen: Blood from Arm, Left Updated: 02/07/21 0901     Blood Culture No growth at 24 hours    Blood Culture - Blood, Hand, Left [[id number]] Collected: 02/06/21 0819    Lab Status: Preliminary result Specimen: Blood from Hand, Left Updated: 02/07/21 0901     Blood Culture No growth at 24 hours    Urine Culture - Urine, Urine, Catheter [[id number]] Collected: 02/05/21 1403    Lab Status: Final result Specimen: Urine, Catheter Updated: 02/06/21 1011     Urine Culture >100,000 CFU/mL Mixed Aria Isolated    Narrative:      Specimen contains mixed organisms of questionable pathogenicity which indicates contamination with commensal aria.  Further identification is unlikely to provide clinically useful information.  Suggest recollection.    Blood Culture - Blood, Arm, Left [[id number]]  (Abnormal) Collected: 02/04/21 1310    Lab Status: Final result Specimen: Blood from Arm, Left Updated: 02/06/21 0638     Blood Culture Staphylococcus, coagulase negative     Comment: Probable contaminant requires clinical correlation, susceptibility not performed unless requested by physician.          Isolated from Aerobic Bottle     Gram Stain Aerobic Bottle Gram positive cocci in groups    Blood Culture ID, PCR - Blood, Arm, Left [[id number]]  (Abnormal) Collected: 02/04/21 1310    Lab Status: Final result Specimen: Blood from Arm, Left Updated: 02/05/21 1334     BCID, PCR Staphylococcus spp, not aureus. Identification by BCID PCR.    Blood Culture - Blood, Arm, Right [[id number]]  (Abnormal) Collected: 02/04/21 1305    Lab Status: Final result Specimen: Blood from  Arm, Right Updated: 02/05/21 1047     Blood Culture Bacillus species     Comment: Probable contaminant requires clinical correlation, susceptibility not performed unless requested by physician.          Isolated from Aerobic Bottle     Gram Stain Aerobic Bottle Gram positive bacilli    Narrative:      Blood culture does not meet the specified criteria for PCR identification.  If pregnant, immunocompromised, or clinical concern for meningitis, call lab to run BCID for Listeria monocytogenes.    COVID PRE-OP / PRE-PROCEDURE SCREENING ORDER (NO ISOLATION) - Swab, Nasopharynx [[id number]]  (Abnormal) Collected: 02/04/21 1456    Lab Status: Final result Specimen: Swab from Nasopharynx Updated: 02/04/21 1633    Narrative:      The following orders were created for panel order COVID PRE-OP / PRE-PROCEDURE SCREENING ORDER (NO ISOLATION) - Swab, Nasopharynx.  Procedure                               Abnormality         Status                     ---------                               -----------         ------                     Respiratory Panel PCR w/...[[id number]]  Abnormal            Final result                 Please view results for these tests on the individual orders.    Respiratory Panel PCR w/COVID-19(SARS-CoV-2) FARRAH/ROZ/SUZY/PAD/COR/MAD/TAD In-House, NP Swab in UTM/VTM, 3-4 HR TAT - Swab, Nasopharynx [[id number]]  (Abnormal) Collected: 02/04/21 1456    Lab Status: Final result Specimen: Swab from Nasopharynx Updated: 02/04/21 1633     ADENOVIRUS, PCR Not Detected     Coronavirus 229E Not Detected     Coronavirus HKU1 Not Detected     Coronavirus NL63 Not Detected     Coronavirus OC43 Not Detected     COVID19 Detected     Human Metapneumovirus Not Detected     Human Rhinovirus/Enterovirus Not Detected     Influenza A PCR Not Detected     Influenza A H1 Not Detected     Influenza A H1 2009 PCR Not Detected     Influenza A H3 Not Detected     Influenza B PCR Not Detected     Parainfluenza Virus 1 Not Detected      Parainfluenza Virus 2 Not Detected     Parainfluenza Virus 3 Not Detected     Parainfluenza Virus 4 Not Detected     RSV, PCR Not Detected     Bordetella pertussis pcr Not Detected     Bordetella parapertussis PCR Not Detected     Chlamydophila pneumoniae PCR Not Detected     Mycoplasma pneumo by PCR Not Detected    Narrative:      Fact sheet for providers: https://docs.walkby/wp-content/uploads/SWQ2820-7416-LH9.1-EUA-Provider-Fact-Sheet-3.pdf    Fact sheet for patients: https://docs.walkby/wp-content/uploads/RNB9648-2831-SV6.1-EUA-Patient-Fact-Sheet-1.pdf    Test performed by PCR.          Imaging Results (Last 24 Hours)     ** No results found for the last 24 hours. **          Results for orders placed during the hospital encounter of 08/09/19   Transthoracic Echo Complete With Contrast if Necessary Per Protocol    Narrative · Estimated EF = 65%.  · Mild mitral valve regurgitation is present  · Moderate tricuspid valve regurgitation is present.  · Calculated right ventricular systolic pressure from tricuspid   regurgitation is 51 mmHg.          I have reviewed the medications:  Scheduled Meds:aspirin, 81 mg, Oral, Daily  cefTRIAXone, 1 g, Intravenous, Q24H  dexamethasone, 6 mg, Oral, Daily With Breakfast    Or  dexamethasone, 6 mg, Intravenous, Daily With Breakfast  ferrous sulfate, 325 mg, Oral, Daily With Breakfast  guaiFENesin, 600 mg, Oral, Q12H  heparin (porcine), 5,000 Units, Subcutaneous, Q12H  labetalol, 100 mg, Oral, Q8H  levothyroxine, 75 mcg, Oral, Q AM  losartan, 100 mg, Oral, Daily  NIFEdipine XL, 30 mg, Oral, Q24H  nystatin, , Topical, Q12H  pantoprazole, 40 mg, Oral, QAM  polyethylene glycol, 17 g, Oral, Daily  remdesivir, 100 mg, Intravenous, Q24H  sodium chloride, 500 mL, Intravenous, Once      Continuous Infusions:niCARdipine, 5-15 mg/hr, Last Rate: Stopped (02/05/21 0420)  Pharmacy Consult - Remdesivir,       PRN Meds:.•  albuterol sulfate HFA  •  benzonatate  •  diazePAM  •   labetalol  •  nitroglycerin  •  Pharmacy Consult - Remdesivir  •  sodium chloride    Assessment/Plan   Assessment & Plan     Active Hospital Problems    Diagnosis  POA   • Pneumonia due to COVID-19 virus [U07.1, J12.82]  Yes   • Microcytic anemia [D50.9]  Yes   • Hypothyroid [E03.9]  Yes   • Hypertension [I10]  Yes   • GERD (gastroesophageal reflux disease) [K21.9]  Yes   • Asthma [J45.909]  Yes      Resolved Hospital Problems   No resolved problems to display.        Brief Hospital Course to date:  Anitra Dee is a 85 y.o. female With a PMH of hypothyroidism, HTN, GERD, who was admitted on 2/4/21 for hypoxia and COVID pneumonia.     Hypoxia  COVID pneumonia   COVID detected first on 2/28  -CTA chest with multiple peripheral GGO, no PE, cardiomegaly without failure and uncomplicated cholelithiasis  -On 3L nasal canula today, continue to wean off supplemental oxygen as tolerated to keep oxygen saturation >90%  -Started on remdesivir and dexamethasone on 2/4   -Continue to monitor inflammatory markers     Uncontrolled HTN  -Continue losartan and labetalol at home doses, started on nifedipine yesterday and patients SBP decreased to 100s. She was complaining of it being too low and feeling dizzy while coughing  -she refused her AM doses of losartan and labetalol, will discontinue nifedipine and monitor      UA concerning for infection  -Urine culture growing mixed aria   -Blood culture from right on 2/4 growing bacillus species - also likely contaminant   -Blood culture from left on 2/4 growing gram-positive cocci in groups -this was likely contaminant   -repeat blood cultures from 2/6 are NG at 24 hours   -Continue ceftriaxone IV     Elevated Cr   Cr 1.08 - unknown baseline, avoid nephrotoxinsCTM      Anemia   -iron deficient, FOBT negative  -Started iron supplement     Hypothyroidism   -continue levothyroxine      GERD -continue PPI     DVT Prophylaxis:  Heparin      Disposition: I expect the patient to be  discharged pending improvement in hypoxia.      CODE STATUS:   Code Status and Medical Interventions:   Ordered at: 02/04/21 1744     Code Status:    CPR     Medical Interventions (Level of Support Prior to Arrest):    Full       Sherrie Garcia MD  02/07/21

## 2021-02-08 PROBLEM — R09.02 HYPOXIA: Status: ACTIVE | Noted: 2021-02-08

## 2021-02-08 LAB
ALBUMIN SERPL-MCNC: 2.8 G/DL (ref 3.5–5.2)
ALBUMIN/GLOB SERPL: 0.9 G/DL
ALP SERPL-CCNC: 74 U/L (ref 39–117)
ALT SERPL W P-5'-P-CCNC: 13 U/L (ref 1–33)
ANION GAP SERPL CALCULATED.3IONS-SCNC: 9 MMOL/L (ref 5–15)
AST SERPL-CCNC: 22 U/L (ref 1–32)
BASOPHILS # BLD AUTO: 0.01 10*3/MM3 (ref 0–0.2)
BASOPHILS NFR BLD AUTO: 0.1 % (ref 0–1.5)
BILIRUB SERPL-MCNC: 0.2 MG/DL (ref 0–1.2)
BUN SERPL-MCNC: 42 MG/DL (ref 8–23)
BUN/CREAT SERPL: 42 (ref 7–25)
CALCIUM SPEC-SCNC: 8.8 MG/DL (ref 8.6–10.5)
CHLORIDE SERPL-SCNC: 106 MMOL/L (ref 98–107)
CK SERPL-CCNC: 37 U/L (ref 20–180)
CO2 SERPL-SCNC: 21 MMOL/L (ref 22–29)
CREAT SERPL-MCNC: 1 MG/DL (ref 0.57–1)
CRP SERPL-MCNC: 1.51 MG/DL (ref 0–0.5)
DEPRECATED RDW RBC AUTO: 45.4 FL (ref 37–54)
EOSINOPHIL # BLD AUTO: 0 10*3/MM3 (ref 0–0.4)
EOSINOPHIL NFR BLD AUTO: 0 % (ref 0.3–6.2)
ERYTHROCYTE [DISTWIDTH] IN BLOOD BY AUTOMATED COUNT: 16.7 % (ref 12.3–15.4)
FERRITIN SERPL-MCNC: 158.1 NG/ML (ref 13–150)
GFR SERPL CREATININE-BSD FRML MDRD: 53 ML/MIN/1.73
GLOBULIN UR ELPH-MCNC: 3.1 GM/DL
GLUCOSE SERPL-MCNC: 198 MG/DL (ref 65–99)
HCT VFR BLD AUTO: 32.3 % (ref 34–46.6)
HGB BLD-MCNC: 9.6 G/DL (ref 12–15.9)
IMM GRANULOCYTES # BLD AUTO: 0.25 10*3/MM3 (ref 0–0.05)
IMM GRANULOCYTES NFR BLD AUTO: 2 % (ref 0–0.5)
LYMPHOCYTES # BLD AUTO: 0.61 10*3/MM3 (ref 0.7–3.1)
LYMPHOCYTES NFR BLD AUTO: 4.9 % (ref 19.6–45.3)
MCH RBC QN AUTO: 22.6 PG (ref 26.6–33)
MCHC RBC AUTO-ENTMCNC: 29.7 G/DL (ref 31.5–35.7)
MCV RBC AUTO: 76.2 FL (ref 79–97)
MONOCYTES # BLD AUTO: 0.37 10*3/MM3 (ref 0.1–0.9)
MONOCYTES NFR BLD AUTO: 3 % (ref 5–12)
NEUTROPHILS NFR BLD AUTO: 11.17 10*3/MM3 (ref 1.7–7)
NEUTROPHILS NFR BLD AUTO: 90 % (ref 42.7–76)
NRBC BLD AUTO-RTO: 0 /100 WBC (ref 0–0.2)
PLATELET # BLD AUTO: 331 10*3/MM3 (ref 140–450)
PMV BLD AUTO: 10.1 FL (ref 6–12)
POTASSIUM SERPL-SCNC: 4.4 MMOL/L (ref 3.5–5.2)
PROT SERPL-MCNC: 5.9 G/DL (ref 6–8.5)
RBC # BLD AUTO: 4.24 10*6/MM3 (ref 3.77–5.28)
SODIUM SERPL-SCNC: 136 MMOL/L (ref 136–145)
WBC # BLD AUTO: 12.41 10*3/MM3 (ref 3.4–10.8)

## 2021-02-08 PROCEDURE — 25010000002 HEPARIN (PORCINE) PER 1000 UNITS: Performed by: INTERNAL MEDICINE

## 2021-02-08 PROCEDURE — 82550 ASSAY OF CK (CPK): CPT | Performed by: NURSE PRACTITIONER

## 2021-02-08 PROCEDURE — 99233 SBSQ HOSP IP/OBS HIGH 50: CPT | Performed by: INTERNAL MEDICINE

## 2021-02-08 PROCEDURE — 63710000001 DEXAMETHASONE PER 0.25 MG: Performed by: NURSE PRACTITIONER

## 2021-02-08 PROCEDURE — 97110 THERAPEUTIC EXERCISES: CPT

## 2021-02-08 PROCEDURE — 80053 COMPREHEN METABOLIC PANEL: CPT | Performed by: NURSE PRACTITIONER

## 2021-02-08 PROCEDURE — 97530 THERAPEUTIC ACTIVITIES: CPT

## 2021-02-08 PROCEDURE — 97116 GAIT TRAINING THERAPY: CPT

## 2021-02-08 PROCEDURE — 85025 COMPLETE CBC W/AUTO DIFF WBC: CPT | Performed by: NURSE PRACTITIONER

## 2021-02-08 PROCEDURE — 94799 UNLISTED PULMONARY SVC/PX: CPT

## 2021-02-08 PROCEDURE — 86140 C-REACTIVE PROTEIN: CPT | Performed by: NURSE PRACTITIONER

## 2021-02-08 PROCEDURE — 82728 ASSAY OF FERRITIN: CPT | Performed by: NURSE PRACTITIONER

## 2021-02-08 PROCEDURE — 97165 OT EVAL LOW COMPLEX 30 MIN: CPT

## 2021-02-08 RX ORDER — LOSARTAN POTASSIUM 50 MG/1
50 TABLET ORAL DAILY
Status: DISCONTINUED | OUTPATIENT
Start: 2021-02-09 | End: 2021-02-09 | Stop reason: HOSPADM

## 2021-02-08 RX ADMIN — HEPARIN SODIUM 5000 UNITS: 5000 INJECTION INTRAVENOUS; SUBCUTANEOUS at 20:50

## 2021-02-08 RX ADMIN — REMDESIVIR 100 MG: 100 INJECTION, POWDER, LYOPHILIZED, FOR SOLUTION INTRAVENOUS at 11:11

## 2021-02-08 RX ADMIN — POLYETHYLENE GLYCOL 3350 17 G: 17 POWDER, FOR SOLUTION ORAL at 09:02

## 2021-02-08 RX ADMIN — LEVOTHYROXINE SODIUM 75 MCG: 75 TABLET ORAL at 05:40

## 2021-02-08 RX ADMIN — DEXAMETHASONE 6 MG: 2 TABLET ORAL at 09:01

## 2021-02-08 RX ADMIN — FERROUS SULFATE TAB 325 MG (65 MG ELEMENTAL FE) 325 MG: 325 (65 FE) TAB at 09:02

## 2021-02-08 RX ADMIN — HEPARIN SODIUM 5000 UNITS: 5000 INJECTION INTRAVENOUS; SUBCUTANEOUS at 09:03

## 2021-02-08 RX ADMIN — DIAZEPAM 2 MG: 2 TABLET ORAL at 20:49

## 2021-02-08 RX ADMIN — LABETALOL HYDROCHLORIDE 100 MG: 100 TABLET, FILM COATED ORAL at 20:49

## 2021-02-08 RX ADMIN — NYSTATIN: 100000 POWDER TOPICAL at 20:50

## 2021-02-08 RX ADMIN — PANTOPRAZOLE SODIUM 40 MG: 40 TABLET, DELAYED RELEASE ORAL at 05:40

## 2021-02-08 RX ADMIN — LABETALOL HYDROCHLORIDE 100 MG: 100 TABLET, FILM COATED ORAL at 05:40

## 2021-02-08 RX ADMIN — GUAIFENESIN 600 MG: 600 TABLET, EXTENDED RELEASE ORAL at 20:49

## 2021-02-08 RX ADMIN — NYSTATIN: 100000 POWDER TOPICAL at 09:02

## 2021-02-08 RX ADMIN — SODIUM CHLORIDE, PRESERVATIVE FREE 10 ML: 5 INJECTION INTRAVENOUS at 09:02

## 2021-02-08 RX ADMIN — GUAIFENESIN 600 MG: 600 TABLET, EXTENDED RELEASE ORAL at 09:03

## 2021-02-08 RX ADMIN — ASPIRIN 81 MG: 81 TABLET, COATED ORAL at 09:01

## 2021-02-08 NOTE — THERAPY TREATMENT NOTE
Patient Name: Anitra Dee  : 1935    MRN: [id number]                              Today's Date: 2021       Admit Date: 2021    Visit Dx:     ICD-10-CM ICD-9-CM   1. Pneumonia due to COVID-19 virus  U07.1 480.8    J12.82 079.89   2. Hypoxia  R09.02 799.02     Patient Active Problem List   Diagnosis   • Post menopausal syndrome   • Pneumonia   • Hypothyroid   • Hypertension   • GERD (gastroesophageal reflux disease)   • Asthma   • Arthritis   • Microcytic anemia   • Pneumonia due to COVID-19 virus     Past Medical History:   Diagnosis Date   • Arthritis    • Asthma    • COVID-19    • GERD (gastroesophageal reflux disease)    • Hypertension    • Hypothyroid    • Pneumonia    • Post menopausal syndrome      Past Surgical History:   Procedure Laterality Date   • HYSTERECTOMY     • KNEE ARTHROPLASTY     • REPAIR PELVIC FLOOR DEFECT VAGINAL APPROACH W/ MESH     • SHOULDER ARTHROTOMY       General Information     Row Name 21 1328          Physical Therapy Time and Intention    Document Type  therapy note (daily note)  -NS     Mode of Treatment  individual therapy;physical therapy  -NS     Row Name 21 1328          General Information    Patient Profile Reviewed  yes  -NS     Existing Precautions/Restrictions  fall;oxygen therapy device and L/min  -NS     Row Name 21 1328          Cognition    Orientation Status (Cognition)  oriented x 4  -NS     Row Name 21 1328          Safety Issues, Functional Mobility    Safety Issues Affecting Function (Mobility)  insight into deficits/self-awareness;safety precaution awareness;safety precautions follow-through/compliance;sequencing abilities  -NS     Impairments Affecting Function (Mobility)  balance;endurance/activity tolerance;shortness of breath;strength  -NS       User Key  (r) = Recorded By, (t) = Taken By, (c) = Cosigned By    Initials Name Provider Type    NS Shaina Mathew PT Physical Therapist        Mobility     Row Name 21  1328          Bed Mobility    Comment (Bed Mobility)  Not assessed; pt UIC at start and end of session.  -NS     Row Name 02/08/21 1328          Transfers    Comment (Transfers)  VCs for hand placement. Pt required extra time to transfer STS from commode.  -NS     Row Name 02/08/21 1328          Sit-Stand Transfer    Sit-Stand [place] (Transfers)  contact guard;verbal cues  -NS     Assistive Device (Sit-Stand Transfers)  walker, front-wheeled  -NS     Row Name 02/08/21 1328          Gait/Stairs (Locomotion)    [place] Level (Gait)  contact guard  -NS     Assistive Device (Gait)  walker, front-wheeled  -NS     Distance in Feet (Gait)  45  -NS     Deviations/Abnormal Patterns (Gait)  umair decreased;gait speed decreased;stride length decreased  -NS     Bilateral Gait Deviations  weight shift ability decreased;heel strike decreased  -NS     Comment (Gait/Stairs)  Patient ambulated around room and to the restroom, where she sat and rested and had moderate SOA with a significant amount of productive cough. Pt with feelings like she was going to pass out and feeling hot. Noted O2 desat to 82% on 3L O2, requiring several minutes to recover. RN present to increase supplemental O2 to 4L.  -NS       User Key  (r) = Recorded By, (t) = Taken By, (c) = Cosigned By    Initials Name Provider Type    Shaina Villalba, PT Physical Therapist        Obj/Interventions     Row Name 02/08/21 1328          Motor Skills    Motor Skills  functional endurance;therapeutic exercise  -NS     Functional Endurance  Pulmonary set: scap retraction, shoulder flexion, shoulder ABD/ADD x5 with cues for PLB. Limited by fatigue.  -NS     Therapeutic Exercise  hip;knee;ankle  -NS     Row Name 02/08/21 1328          Hip (Therapeutic Exercise)    Hip (Therapeutic Exercise)  strengthening exercise  -NS     Hip Strengthening (Therapeutic Exercise)  bilateral;external rotation;internal rotation;marching while seated;10 repetitions  -[facility]  Name 02/08/21 1328          Knee (Therapeutic Exercise)    Knee (Therapeutic Exercise)  strengthening exercise  -NS     Knee Strengthening (Therapeutic Exercise)  bilateral;LAQ (long arc quad);10 repetitions  -NS     Row Name 02/08/21 1328          Ankle (Therapeutic Exercise)    Ankle (Therapeutic Exercise)  AROM (active range of motion)  -NS     Ankle AROM (Therapeutic Exercise)  bilateral;dorsiflexion;plantarflexion;10 repetitions  -NS     Row Name 02/08/21 1328          Balance    Balance Assessment  sitting static balance;sitting dynamic balance;standing static balance;standing dynamic balance  -NS     Static Sitting Balance  WFL;unsupported;sitting in chair  -NS     Dynamic Sitting Balance  WFL;unsupported;sitting in chair  -NS     Static Standing Balance  WFL;supported;standing  -NS     Dynamic Standing Balance  mild impairment;supported;standing  -NS       User Key  (r) = Recorded By, (t) = Taken By, (c) = Cosigned By    Initials Name Provider Type    Shaina Villalba, PT Physical Therapist        Goals/Plan    No documentation.       Clinical Impression     Row Name 02/08/21 1328          Pain    Additional Documentation  Pain Scale: Numbers Pre/Post-Treatment (Group)  -NS     Row Name 02/08/21 1328          Pain Scale: Numbers Pre/Post-Treatment    Pretreatment Pain Rating  0/10 - no pain  -NS     Posttreatment Pain Rating  0/10 - no pain  -NS     Row Name 02/08/21 1328          Plan of Care Review    Plan of Care Reviewed With  patient  -NS     Progress  no change  -NS     Outcome Summary  Patient continues to be limited by dyspnea upon exertion. She transferred STS with CGA and ambulated 45ft with RW and CGA, demonstrating moderate SOA, coughing, and feeling her temperature rise. Noted O2 desat to 82% on 3L O2, requiring several minutes and cues for PLB to raise to 91%. Will continue per PT POC. Continue to recommend SNF and FWW at discharge.  -NS     Row Name 02/08/21 1328          Vital Signs    Pre  Systolic BP Rehab  -- VSS- RN cleared for PT  -NS     Pre SpO2 (%)  93  -NS     O2 Delivery Pre Treatment  nasal cannula  -NS     Intra SpO2 (%)  82  -NS     O2 Delivery Intra Treatment  nasal cannula  -NS     Post SpO2 (%)  91  -NS     O2 Delivery Post Treatment  nasal cannula  -NS     Pre Patient Position  Sitting  -NS     Intra Patient Position  Standing  -NS     Post Patient Position  Sitting  -NS     Row Name 02/08/21 1328          Positioning and Restraints    Pre-Treatment Position  sitting in chair/recliner  -NS     Post Treatment Position  chair  -NS     In Chair  notified nsg;reclined;call light within reach;encouraged to call for assist;exit alarm on;legs elevated;waffle cushion;with nsg;heels elevated  -NS       User Key  (r) = Recorded By, (t) = Taken By, (c) = Cosigned By    Initials Name Provider Type    Shaina Villalba PT Physical Therapist        Outcome Measures     Row Name 02/08/21 1328          How much help from another person do you currently need...    Turning from your back to your side while in flat bed without using bedrails?  3  -NS     Moving from lying on back to sitting on the side of a flat bed without bedrails?  3  -NS     Moving to and from a bed to a chair (including a wheelchair)?  3  -NS     Standing up from a chair using your arms (e.g., wheelchair, bedside chair)?  3  -NS     Climbing 3-5 steps with a railing?  2  -NS     To walk in hospital room?  3  -NS     AM-PAC 6 Clicks Score (PT)  17  -NS     Row Name 02/08/21 1328          Functional Assessment    Outcome Measure Options  AM-PAC 6 Clicks Basic Mobility (PT)  -NS       User Key  (r) = Recorded By, (t) = Taken By, (c) = Cosigned By    Initials Name Provider Type    Shaina Villalba PT Physical Therapist        Physical Therapy Education                 Title: PT OT SLP Therapies (In Progress)     Topic: Physical Therapy (In Progress)     Point: Mobility training (In Progress)     Learning Progress Summary            Patient Acceptance, E, NR by NS at 2/8/2021 1444    Acceptance, E, NR by NS at 2/6/2021 1202    Comment: Patient was educated about PT POC, benefits of using AD, PLB, and pulmonary exercises.                   Point: Home exercise program (In Progress)     Learning Progress Summary           Patient Acceptance, E, NR by NS at 2/8/2021 1444    Acceptance, E, NR by NS at 2/6/2021 1202    Comment: Patient was educated about PT POC, benefits of using AD, PLB, and pulmonary exercises.                   Point: Body mechanics (In Progress)     Learning Progress Summary           Patient Acceptance, E, NR by NS at 2/8/2021 1444    Acceptance, E, NR by NS at 2/6/2021 1202    Comment: Patient was educated about PT POC, benefits of using AD, PLB, and pulmonary exercises.                   Point: Precautions (In Progress)     Learning Progress Summary           Patient Acceptance, E, NR by NS at 2/8/2021 1444    Acceptance, E, NR by NS at 2/6/2021 1202    Comment: Patient was educated about PT POC, benefits of using AD, PLB, and pulmonary exercises.                               User Key     Initials Effective Dates Name Provider Type Discipline    NS 09/10/19 -  Shaina Mathew, PT Physical Therapist PT              PT Recommendation and Plan  Planned Therapy Interventions (PT): balance training, bed mobility training, gait training, home exercise program, patient/family education, neuromuscular re-education, strengthening, transfer training, stair training  Plan of Care Reviewed With: patient  Progress: no change  Outcome Summary: Patient continues to be limited by dyspnea upon exertion. She transferred STS with CGA and ambulated 45ft with RW and CGA, demonstrating moderate SOA, coughing, and feeling her temperature rise. Noted O2 desat to 82% on 3L O2, requiring several minutes and cues for PLB to raise to 91%. Will continue per PT POC. Continue to recommend SNF and FWW at discharge.     Time Calculation:   PT Charges      Row Name 02/08/21 1328             Time Calculation    Start Time  1328  -NS      PT Received On  02/08/21  -NS      PT Goal Re-Cert Due Date  02/16/21  -NS         Timed Charges    76805 - PT Therapeutic Exercise Minutes  10  -NS      19996 - Gait Training Minutes   13  -NS      59218 - PT Therapeutic Activity Minutes  15  -NS        User Key  (r) = Recorded By, (t) = Taken By, (c) = Cosigned By    Initials Name Provider Type    Shaina Villalba, PT Physical Therapist        Therapy Charges for Today     Code Description Service Date Service Provider Modifiers Qty    87668512654 HC PT THER PROC EA 15 MIN 2/8/2021 Shaina Mathew, PT GP 1    68707588546 HC GAIT TRAINING EA 15 MIN 2/8/2021 Shaina Mathew, PT GP 1    58956289105 HC PT THERAPEUTIC ACT EA 15 MIN 2/8/2021 Shaina Mathew, PT GP 1          PT G-Codes  Outcome Measure Options: AM-PAC 6 Clicks Basic Mobility (PT)  AM-PAC 6 Clicks Score (PT): 17  AM-PAC 6 Clicks Score (OT): 15    Shaina Mathew PT  2/8/2021

## 2021-02-08 NOTE — PLAN OF CARE
Problem: Adult Inpatient Plan of Care  Goal: Plan of Care Review  Outcome: Ongoing, Progressing   Goal Outcome Evaluation:  Patient currently on 4LNC, up in chair.  PT worked with her today and she was very SOA and the chair and we had to roll it to the bathroom for her to sit, oxygen saturation was 83%.  Titrated up to 4LNC. Daughter updated about plan of care.

## 2021-02-08 NOTE — PROGRESS NOTES
Continued Stay Note  [facility]     Patient Name: Anitra Dee  MRN: [id number]  Today's Date: 2/8/2021    Admit Date: 2/4/2021    Discharge Plan     Row Name 02/08/21 1343       Plan    Plan  update    Patient/Family in Agreement with Plan  yes    Plan Comments  Spoke with patient via telephone regarding dicharge plan who reports that she is feeling some better and anticpates going home in the next day or two.  Per PT recs, patient ok to discharge home with HH services.  Patient reports she has used HH in the past but does not remember what agency it was but she really liked them.  Discussed postential need for home O2, patient denies DME preference.  Patient denies having a Pulse Oximeter, CM advised that a Pulse Oximeter will be provided to her at discharge.  No other discharge needs verbalized.  Called patient daughter, Skylar, regarding HH preferences who does not remember what agency she used but will call her PCP office and find out who they used and call back.  Patient is currently on 3LO2NC.  CM following.  Patient plan is to discharge home with HH via car with family to transport.    Final Discharge Disposition Code  06 - home with home health care        Discharge Codes    No documentation.             Basilia An, RN

## 2021-02-08 NOTE — PLAN OF CARE
Goal Outcome Evaluation:  Plan of Care Reviewed With: patient  Progress: no change  Outcome Summary: OT evaluation completed.  Pt. demonstrating decreased strength core, impaired activity tolerance and mild decrease in balance impacting ADL independence warrantly continued skilled OT servcies.  Pt. SBA with extra time to EOB, 02 sate to 87%.  Pt. unable to elevate with AB well and maintain. 02 to 5L for activity.  Pt. stood CGA and mobilize around bed once 02 back to 92% on 5L.  Pt. 02 sats dropped to 83% after 12 ft to chair.  Several minutes to recover with AB so 02 could be lowered back to 2.5 L.  Pt. able to self feed post setup. If pt. able to tolerate lower 02 with good 02 sats with activity will likley be able to discharge home with HH OT/PT and dtr. assist, but if continues to struggle with 02 sat drop with activity may need SNF at discharge.  Shower chair recommended for home.

## 2021-02-08 NOTE — PROGRESS NOTES
"    [facility] Medicine Services  PROGRESS NOTE    Patient Name: Anitra Dee  : 1935  MRN: [id number]    Date of Admission: 2021  Primary Care Physician: Eleuterio Myers MD    Subjective   Subjective     CC:  Follow-up Covid pneumonia    HPI:  Feels like she is just getting started for the day.  Breathing feels \"tight.\"  No sputum production.  No events overnight    ROS:  Gen- No fevers, chills  CV- No chest pain, palpitations  Resp- No cough, yes dyspnea  GI- No N/V/D, abd pain    Objective   Objective     Vital Signs:   Temp:  [96.7 °F (35.9 °C)-97.1 °F (36.2 °C)] 96.7 °F (35.9 °C)  Heart Rate:  [58-74] 65  Resp:  [20-22] 20  BP: (120-136)/(55-64) 132/57        Physical Exam:  With patient's consent, physical exam was conducted via visual telemedicine encounter due to patient's current isolation requirements in the interest of PPE conservation.    Constitutional: No acute distress, awake, alert, nontoxic, obese body habitus, chronically ill-appearing  HENT: NCAT, no conjunctival injection  Respiratory: Good effort, nonlabored respirations   Cardiovascular:  tele with NSR  Musculoskeletal: No edema, normal muscle tone and mass for age  Psychiatric: Appropriate affect, good insight and judgement, cooperative, mild cognitive delay  Neurologic: Movements symmetric BUE and BLE, speech clear and fluent  Skin: No visible rashes, no jaundice seen on exposed skin through window      Results Reviewed:  Results from last 7 days   Lab Units 21  0615 21  1021 21  0637  21  2235 21  1216   WBC 10*3/mm3 12.41* 12.52* 10.97*   < >  --  4.17   HEMOGLOBIN g/dL 9.6* 9.1* 9.6*   < >  --  9.0*   HEMATOCRIT % 32.3* 30.7* 32.2*   < >  --  30.5*   PLATELETS 10*3/mm3 331 326 298   < >  --  224   PROCALCITONIN ng/mL  --   --   --   --  0.04 0.08    < > = values in this interval not displayed.     Results from last 7 days   Lab Units 21  0615 21  1021 " 02/06/21  0637  02/04/21  1216   SODIUM mmol/L 136 136 139   < > 139   POTASSIUM mmol/L 4.4 4.7 4.6   < > 4.3   CHLORIDE mmol/L 106 104 104   < > 103   CO2 mmol/L 21.0* 23.0 25.0   < > 28.0   BUN mg/dL 42* 47* 28*   < > 16   CREATININE mg/dL 1.00 1.08* 1.08*   < > 1.04*   GLUCOSE mg/dL 198* 255* 170*   < > 133*   CALCIUM mg/dL 8.8 8.8 9.0   < > 8.8   ALT (SGPT) U/L 13 10 8   < > 8   AST (SGOT) U/L 22 19 21   < > 25   TROPONIN T ng/mL  --   --   --   --  <0.010   PROBNP pg/mL  --   --   --   --  2,336.0*    < > = values in this interval not displayed.     Estimated Creatinine Clearance: 39.8 mL/min (by C-G formula based on SCr of 1 mg/dL).    Microbiology Results Abnormal     Procedure Component Value - Date/Time    Blood Culture - Blood, Arm, Left [[id number]] Collected: 02/06/21 0819    Lab Status: Preliminary result Specimen: Blood from Arm, Left Updated: 02/08/21 0900     Blood Culture No growth at 2 days    Blood Culture - Blood, Hand, Left [[id number]] Collected: 02/06/21 0819    Lab Status: Preliminary result Specimen: Blood from Hand, Left Updated: 02/08/21 0900     Blood Culture No growth at 2 days    Urine Culture - Urine, Urine, Catheter [[id number]] Collected: 02/05/21 1403    Lab Status: Final result Specimen: Urine, Catheter Updated: 02/06/21 1011     Urine Culture >100,000 CFU/mL Mixed Aria Isolated    Narrative:      Specimen contains mixed organisms of questionable pathogenicity which indicates contamination with commensal aria.  Further identification is unlikely to provide clinically useful information.  Suggest recollection.    Blood Culture - Blood, Arm, Left [[id number]]  (Abnormal) Collected: 02/04/21 1310    Lab Status: Final result Specimen: Blood from Arm, Left Updated: 02/06/21 0638     Blood Culture Staphylococcus, coagulase negative     Comment: Probable contaminant requires clinical correlation, susceptibility not performed unless requested by physician.          Isolated from Aerobic  Bottle     Gram Stain Aerobic Bottle Gram positive cocci in groups    Blood Culture ID, PCR - Blood, Arm, Left [[id number]]  (Abnormal) Collected: 02/04/21 1310    Lab Status: Final result Specimen: Blood from Arm, Left Updated: 02/05/21 1334     BCID, PCR Staphylococcus spp, not aureus. Identification by BCID PCR.    Blood Culture - Blood, Arm, Right [[id number]]  (Abnormal) Collected: 02/04/21 1305    Lab Status: Final result Specimen: Blood from Arm, Right Updated: 02/05/21 1047     Blood Culture Bacillus species     Comment: Probable contaminant requires clinical correlation, susceptibility not performed unless requested by physician.          Isolated from Aerobic Bottle     Gram Stain Aerobic Bottle Gram positive bacilli    Narrative:      Blood culture does not meet the specified criteria for PCR identification.  If pregnant, immunocompromised, or clinical concern for meningitis, call lab to run BCID for Listeria monocytogenes.    COVID PRE-OP / PRE-PROCEDURE SCREENING ORDER (NO ISOLATION) - Swab, Nasopharynx [[id number]]  (Abnormal) Collected: 02/04/21 1456    Lab Status: Final result Specimen: Swab from Nasopharynx Updated: 02/04/21 1633    Narrative:      The following orders were created for panel order COVID PRE-OP / PRE-PROCEDURE SCREENING ORDER (NO ISOLATION) - Swab, Nasopharynx.  Procedure                               Abnormality         Status                     ---------                               -----------         ------                     Respiratory Panel PCR w/...[[id number]]  Abnormal            Final result                 Please view results for these tests on the individual orders.    Respiratory Panel PCR w/COVID-19(SARS-CoV-2) FARRAH/ROZ/SUZY/PAD/COR/MAD/TAD In-House, NP Swab in UTM/VTM, 3-4 HR TAT - Swab, Nasopharynx [[id number]]  (Abnormal) Collected: 02/04/21 1456    Lab Status: Final result Specimen: Swab from Nasopharynx Updated: 02/04/21 1633     ADENOVIRUS, PCR Not Detected      Coronavirus 229E Not Detected     Coronavirus HKU1 Not Detected     Coronavirus NL63 Not Detected     Coronavirus OC43 Not Detected     COVID19 Detected     Human Metapneumovirus Not Detected     Human Rhinovirus/Enterovirus Not Detected     Influenza A PCR Not Detected     Influenza A H1 Not Detected     Influenza A H1 2009 PCR Not Detected     Influenza A H3 Not Detected     Influenza B PCR Not Detected     Parainfluenza Virus 1 Not Detected     Parainfluenza Virus 2 Not Detected     Parainfluenza Virus 3 Not Detected     Parainfluenza Virus 4 Not Detected     RSV, PCR Not Detected     Bordetella pertussis pcr Not Detected     Bordetella parapertussis PCR Not Detected     Chlamydophila pneumoniae PCR Not Detected     Mycoplasma pneumo by PCR Not Detected    Narrative:      Fact sheet for providers: https://docs.[employer]/wp-content/uploads/PAT1008-5715-ZJ0.1-EUA-Provider-Fact-Sheet-3.pdf    Fact sheet for patients: https://docs.[employer]/wp-content/uploads/DZG5744-2577-PW3.1-EUA-Patient-Fact-Sheet-1.pdf    Test performed by PCR.          Imaging Results (Last 24 Hours)     ** No results found for the last 24 hours. **          Results for orders placed during the hospital encounter of 08/09/19   Transthoracic Echo Complete With Contrast if Necessary Per Protocol    Narrative · Estimated EF = 65%.  · Mild mitral valve regurgitation is present  · Moderate tricuspid valve regurgitation is present.  · Calculated right ventricular systolic pressure from tricuspid   regurgitation is 51 mmHg.          I have reviewed the medications:  Scheduled Meds:aspirin, 81 mg, Oral, Daily  dexamethasone, 6 mg, Oral, Daily With Breakfast    Or  dexamethasone, 6 mg, Intravenous, Daily With Breakfast  ferrous sulfate, 325 mg, Oral, Daily With Breakfast  guaiFENesin, 600 mg, Oral, Q12H  heparin (porcine), 5,000 Units, Subcutaneous, Q12H  labetalol, 100 mg, Oral, Q8H  levothyroxine, 75 mcg, Oral, Q AM  [START ON 2/9/2021]  "losartan, 50 mg, Oral, Daily  nystatin, , Topical, Q12H  pantoprazole, 40 mg, Oral, QAM  polyethylene glycol, 17 g, Oral, Daily  sodium chloride, 500 mL, Intravenous, Once      Continuous Infusions:Pharmacy Consult - Remdesivir,       PRN Meds:.•  albuterol sulfate HFA  •  benzonatate  •  diazePAM  •  labetalol  •  nitroglycerin  •  Pharmacy Consult - Remdesivir  •  sodium chloride    Assessment/Plan   Assessment & Plan     Active Hospital Problems    Diagnosis  POA   • Pneumonia due to COVID-19 virus [U07.1, J12.82]  Yes   • Microcytic anemia [D50.9]  Yes   • Hypothyroid [E03.9]  Yes   • Hypertension [I10]  Yes   • GERD (gastroesophageal reflux disease) [K21.9]  Yes   • Asthma [J45.909]  Yes      Resolved Hospital Problems   No resolved problems to display.        Brief Hospital Course to date:  Anitra Dee is a 85 y.o. female with hypothyroidism, HTN, GERD, asthma diagnosed with COVID-19 8 days PTA initially doing well then progressively worse symptoms ultimately admitted for management of the same    The following problems are new to me today    Assessment/plan    COVID-19 pneumonia with hypoxia  -First pos test 1/28/2021; isolate through 2/17/2021  -CTA without PE  -Continue dexamethasone (x10 days is 2/13/2021)  -Continue remdesivir (x10 days as of today, 2/8/2021)  -Daily disease progression labs  -O2 support as required    Hypertension  -Initially uncontrolled, started on nifedipine with symptomatic relative hypotension; subsequently discontinued  -Continue labetalol at home dose  -Continues to refuse losartan, will decrease dose as she has been normotensive today    UTI ruled out  History of \"recurrent UTIs\"  -Contaminated sample, culture returned sterile  -Discontinue antibiotics, s/p x1 dose IV CTX  -Follows with urology outpatient, history of atrophic vaginitis, nonadherence to topical estrogen cream per outpatient documentation; Menjivar catheter was placed several days ago for concerns of mucosal " interruption, discontinue Menjivar today    Contaminated blood culture  -Single blood culture with coag neg staph    Suspected CKD 3a  -Baseline Cr 0.9-1.1; EGFR high 40s-50s    Hypothyroidism  GERD  -Continue levothyroxine, PPI    Chronic mixed urge/stress incontinence  -Follows with urology outpatient, s/p nerve stimulator 1/20/2021    DVT Prophylaxis: Heparin    Called and updated patient's daughter at 1313, discussed possible discharge tomorrow; daughter Skylar was tested (asymptomatic) today due to exposure to the patient    Disposition: I expect the patient to be discharged as early as tomorrow; family is all Covid negative and they are unsure how to care for her appropriately at home without exposing themselves.    CODE STATUS:   Code Status and Medical Interventions:   Ordered at: 02/04/21 1744     Code Status:    CPR     Medical Interventions (Level of Support Prior to Arrest):    Full       Mario Xie,   02/08/21

## 2021-02-08 NOTE — THERAPY EVALUATION
Patient Name: Anitra Dee  : 1935    MRN: [id number]                              Today's Date: 2021       Admit Date: 2021    Visit Dx:     ICD-10-CM ICD-9-CM   1. Pneumonia due to COVID-19 virus  U07.1 480.8    J12.82 079.89   2. Hypoxia  R09.02 799.02     Patient Active Problem List   Diagnosis   • Post menopausal syndrome   • Pneumonia   • Hypothyroid   • Hypertension   • GERD (gastroesophageal reflux disease)   • Asthma   • Arthritis   • Microcytic anemia   • Pneumonia due to COVID-19 virus     Past Medical History:   Diagnosis Date   • Arthritis    • Asthma    • COVID-19    • GERD (gastroesophageal reflux disease)    • Hypertension    • Hypothyroid    • Pneumonia    • Post menopausal syndrome      Past Surgical History:   Procedure Laterality Date   • HYSTERECTOMY     • KNEE ARTHROPLASTY     • REPAIR PELVIC FLOOR DEFECT VAGINAL APPROACH W/ MESH     • SHOULDER ARTHROTOMY       General Information     Row Name 21 1000          OT Time and Intention    Document Type  evaluation  -GABRIELLE     Mode of Treatment  individual therapy;occupational therapy  -     Row Name 21 1000          General Information    Patient Profile Reviewed  yes  -GABRIELLE     Prior Level of Function  independent:;all household mobility;community mobility;ADL's;home management;driving;shopping  -GABRIELLE     Existing Precautions/Restrictions  fall;oxygen therapy device and L/min  -GABRIELLE     Barriers to Rehab  none identified  -GABRILELE     Row Name 21 1000          Living Environment    Lives With  alone per pt. dtr. can come and stay with her for a while and assist her.  -GABRIELLE     Row Name 21 1000          Home Main Entrance    Number of Stairs, Main Entrance  two  -GABRIELLE     Stair Railings, Main Entrance  railing on right side (ascending)  -GABRIELLE     Row Name 21 1000          Stairs Within Home, Primary    Number of Stairs, Within Home, Primary  none  -GABRIELLE     Stairs Comment, Within Home, Primary  pt. with tub shower with  stephaneb bar  -     Row Name 02/08/21 1000          Cognition    Orientation Status (Cognition)  oriented x 4  -     Row Name 02/08/21 1000          Safety Issues, Functional Mobility    Impairments Affecting Function (Mobility)  balance;endurance/activity tolerance;shortness of breath;strength  -       User Key  (r) = Recorded By, (t) = Taken By, (c) = Cosigned By    Initials Name Provider Type     Stella Vargas OT Occupational Therapist          Mobility/ADL's     Row Name 02/08/21 1001          Bed Mobility    Bed Mobility  supine-sit  -     Supine-Sit [place] (Bed Mobility)  standby assist;verbal cues  -     Bed Mobility, Safety Issues  decreased use of legs for bridging/pushing;impaired trunk control for bed mobility  -     Assistive Device (Bed Mobility)  bed rails;head of bed elevated  -     Comment (Bed Mobility)  pt. needed extra time and effort, but performed on own.  Per pt. lately sleeping at home in recliner.  02 to 87% and unable to keep up so 02 elevated to 5L.  -     Row Name 02/08/21 1001          Transfers    Transfers  sit-stand transfer  -     Sit-Stand [place] (Transfers)  contact guard  -[facility] Name 02/08/21 1001          Sit-Stand Transfer    Assistive Device (Sit-Stand Transfers)  walker, front-wheeled  -[facility] Name 02/08/21 1001          Functional Mobility    Functional Mobility- Ind. Level  contact guard assist  -     Functional Mobility- Device  rolling walker  -     Functional Mobility-Distance (Feet)  12  -     Functional Mobility- Safety Issues  supplemental O2  -     Row Name 02/08/21 1001          Activities of Daily Living    BADL Assessment/Intervention  lower body dressing;feeding;grooming  -[facility] Name 02/08/21 1001          Lower Body Dressing Assessment/Training    [place] Level (Lower Body Dressing)  don;socks;dependent (less than 25% patient effort)  -     Position (Lower Body Dressing)  edge of bed sitting  -      Comment (Lower Body Dressing)  pt. unable to perform at EOB today due to weakness and SOA  -[facility] Name 02/08/21 1001          Self-Feeding Assessment/Training    [place] Level (Feeding)  liquids to mouth;independent  -     Position (Self-Feeding)  unsupported sitting;supported sitting  -[facility] Name 02/08/21 1001          Grooming Assessment/Training    [place] Level (Grooming)  wash face, hands;set up  -     Position (Grooming)  long sitting  -       User Key  (r) = Recorded By, (t) = Taken By, (c) = Cosigned By    Initials Name Provider Type    Stella North OT Occupational Therapist        Obj/Interventions     [facility] Name 02/08/21 1004          Sensory Assessment (Somatosensory)    Sensory Assessment (Somatosensory)  UE sensation intact  -[facility] Name 02/08/21 1004          Vision Assessment/Intervention    Visual Impairment/Limitations  WFL  -[facility] Name 02/08/21 1004          Range of Motion Comprehensive    General Range of Motion  bilateral upper extremity ROM WNL  -[facility] Name 02/08/21 1004          Strength Comprehensive (MMT)    General Manual Muscle Testing (MMT) Assessment  no strength deficits identified  -     Comment, General Manual Muscle Testing (MMT) Assessment  BUE strength grossly 4+ to 5/5  -[facility] Name 02/08/21 1004          Shoulder (Therapeutic Exercise)    Shoulder (Therapeutic Exercise)  AROM (active range of motion)  -     Shoulder AROM (Therapeutic Exercise)  bilateral;flexion;extension;horizontal aBduction/aDduction;scapular retraction;5 second hold;10 repetitions 5-10 reps of each, rest period in between with AB  -[facility] Name 02/08/21 1004          Balance    Static Sitting Balance  WFL;unsupported;sitting, edge of bed  -     Static Standing Balance  WFL;supported;standing  -     Dynamic Standing Balance  mild impairment;supported;standing  -[facility] Name 02/08/21 1004          Therapeutic Exercise    Therapeutic Exercise   shoulder  -GABRIELLE       User Key  (r) = Recorded By, (t) = Taken By, (c) = Cosigned By    Initials Name Provider Type    GABRIELLE Stella Vargas, OT Occupational Therapist        Goals/Plan     Row Name 02/08/21 1011          Transfer Goal 1 (OT)    Activity/Assistive Device (Transfer Goal 1, OT)  toilet;walker, rolling;commode;sit-to-stand/stand-to-sit  -GABRIELLE     [place] Level/Cues Needed (Transfer Goal 1, OT)  standby assist  -GABRIELLE     Time Frame (Transfer Goal 1, OT)  long term goal (LTG);10 days  -GABRIELLE     Progress/Outcome (Transfer Goal 1, OT)  goal ongoing  -GABRIELLE     Row Name 02/08/21 1011          Dressing Goal 1 (OT)    Activity/Device (Dressing Goal 1, OT)  lower body dressing AE prn, socks and undergarment  -GABRIELLE     [place]/Cues Needed (Dressing Goal 1, OT)  minimum assist (75% or more patient effort)  -GABRIELLE     Time Frame (Dressing Goal 1, OT)  long term goal (LTG);10 days  -GABRIELLE     Strategies/Barriers (Dressing Goal 1, OT)  02 sats 90% or greater  -GABRIELLE     Progress/Outcome (Dressing Goal 1, OT)  goal ongoing  -GABRIELLE     Row Name 02/08/21 1011          Toileting Goal 1 (OT)    Activity/Device (Toileting Goal 1, OT)  toileting skills, all;commode  -GABRIELLE     [place] Level/Cues Needed (Toileting Goal 1, OT)  standby assist  -GABRIELLE     Time Frame (Toileting Goal 1, OT)  long term goal (LTG);10 days  -GABRIELLE     Progress/Outcome (Toileting Goal 1, OT)  goal ongoing  -GABRIELLE     Row Name 02/08/21 1011          Grooming Goal 1 (OT)    Activity/Device (Grooming Goal 1, OT)  hair care;oral care;wash face, hands sinkside  -GABRIELLE     [place] (Grooming Goal 1, OT)  standby assist  -GABRIELLE     Time Frame (Grooming Goal 1, OT)  long term goal (LTG);10 days  -GABRIELLE     Strategies/Barriers (Grooming Goal 1, OT)  02 sats 90% or greater  -GABRIELLE     Progress/Outcome (Grooming Goal 1, OT)  goal ongoing  -GABRIELLE     Row Name 02/08/21 1011          ROM Goal 1 (OT)    ROM Goal 1 (OT)  Pt. independent with 10 reps each pulmonary TE.  -GABRIELLE     Strategies/Barriers  (ROM Goal 1, OT)  02 sats 90 % or greater.  -GABRIELLE     Progress/Outcome (ROM Goal 1, OT)  goal ongoing  -GABRIELLE     Row Name 02/08/21 1011          Therapy Assessment/Plan (OT)    Planned Therapy Interventions (OT)  activity tolerance training;BADL retraining;functional balance retraining;occupation/activity based interventions;patient/caregiver education/training;ROM/therapeutic exercise;transfer/mobility retraining  -GABRIELLE       User Key  (r) = Recorded By, (t) = Taken By, (c) = Cosigned By    Initials Name Provider Type    Stella North, OT Occupational Therapist        Clinical Impression     Row Name 02/08/21 1006          Pain Scale: Numbers Pre/Post-Treatment    Pretreatment Pain Rating  0/10 - no pain  -GABRIELLE     Posttreatment Pain Rating  0/10 - no pain  -GABRIELLE     Row Name 02/08/21 1006          Plan of Care Review    Plan of Care Reviewed With  patient  -GABRIELLE     Progress  no change  -GABRIELLE     Outcome Summary  OT evaluation completed.  Pt. demonstrating decreased strength core, impaired activity tolerance and mild decrease in balance impacting ADL independence warrantly continued skilled OT servcies.  Pt. SBA with extra time to EOB, 02 sate to 87%.  Pt. unable to elevate with AB well and maintain. 02 to 5L for activity.  Pt. stood CGA and mobilize around bed once 02 back to 92% on 5L.  Pt. 02 sats dropped to 83% after 12 ft to chair.  Several minutes to recover with AB so 02 could be lowered back to 2.5 L.  Pt. able to self feed post setup. If pt. able to tolerate lower 02 with good 02 sats with activity will likley be able to discharge home with HH OT/PT and dtr. assist, but if continues to struggle with 02 sat drop with activity may need SNF at discharge.  Shower chair recommended for home.  -GABRIELLE     Row Name 02/08/21 1006          Therapy Assessment/Plan (OT)    Patient/Family Therapy Goal Statement (OT)  return home to PLOF  -GABRIELLE     Rehab Potential (OT)  good, to achieve stated therapy goals  -GABRIELLE     Criteria for  Skilled Therapeutic Interventions Met (OT)  yes;meets criteria;skilled treatment is necessary  -GABRIELLE     Therapy Frequency (OT)  daily  -GABRIELLE     Row Name 02/08/21 1006          Therapy Plan Review/Discharge Plan (OT)    Equipment Needs Upon Discharge (OT)  shower chair  -GABRIELLE     Anticipated Discharge Disposition (OT)  skilled nursing facility;home with assist;home with home health  -GABRIELLE     Row Name 02/08/21 1006          Vital Signs    Pre Systolic BP Rehab  118  -GABRIELLE     Pre Treatment Diastolic BP  57  -GABRIELLE     Post Systolic BP Rehab  113  -GABRIELLE     Post Treatment Diastolic BP  56  -GABRIELLE     Pretreatment Heart Rate (beats/min)  59  -GABRIELLE     Posttreatment Heart Rate (beats/min)  55  -GABRIELLE     Pre SpO2 (%)  95  -GABRIELLE     O2 Delivery Pre Treatment  nasal cannula  -GABRIELLE     Intra SpO2 (%)  83  -GABRIELLE     O2 Delivery Intra Treatment  nasal cannula  -GABRIELLE     Post SpO2 (%)  90  -GABRIELLE     O2 Delivery Post Treatment  nasal cannula  -GABRIELLE     Pre Patient Position  Supine  -GABRIELLE     Intra Patient Position  Standing  -GABRIELLE     Post Patient Position  Sitting  -GABREILLE     Row Name 02/08/21 1006          Positioning and Restraints    Pre-Treatment Position  in bed  -GABRIELLE     Post Treatment Position  chair  -GABRIELLE     In Chair  reclined;call light within reach;encouraged to call for assist;exit alarm on;waffle cushion  -       User Key  (r) = Recorded By, (t) = Taken By, (c) = Cosigned By    Initials Name Provider Type    Stella North, OT Occupational Therapist        Outcome Measures     Row Name 02/08/21 1015          How much help from another is currently needed...    Putting on and taking off regular lower body clothing?  2  -GABRIELLE     Bathing (including washing, rinsing, and drying)  2  -GABRIELLE     Toileting (which includes using toilet bed pan or urinal)  2  -GABRIELLE     Putting on and taking off regular upper body clothing  2  -GABRIELLE     Taking care of personal grooming (such as brushing teeth)  3  -GABRIELLE     Eating meals  4  -GABRIELLE     AM-PAC 6 Clicks Score (OT)  15  -GABRIELLE      Row Name 02/08/21 1015          Functional Assessment    Outcome Measure Options  AM-PAC 6 Clicks Daily Activity (OT)  -GABRIELLE       User Key  (r) = Recorded By, (t) = Taken By, (c) = Cosigned By    Initials Name Provider Type    Stella North OT Occupational Therapist        Occupational Therapy Education                 Title: PT OT SLP Therapies (In Progress)     Topic: Occupational Therapy (In Progress)     Point: ADL training (Done)     Description:   Instruct learner(s) on proper safety adaptation and remediation techniques during self care or transfers.   Instruct in proper use of assistive devices.              Learning Progress Summary           Patient Acceptance, E,D, VU,NR by GABRIELLE at 2/8/2021 1016    Comment: reason for consult, noted deficits, AB, pulmonary TE, recommendation of shower chair, why and where to purchase.                   Point: Home exercise program (Done)     Description:   Instruct learner(s) on appropriate technique for monitoring, assisting and/or progressing therapeutic exercises/activities.              Learning Progress Summary           Patient Acceptance, E,D, VU,NR by GABRIELLE at 2/8/2021 1016    Comment: reason for consult, noted deficits, AB, pulmonary TE, recommendation of shower chair, why and where to purchase.                   Point: Precautions (Not Started)     Description:   Instruct learner(s) on prescribed precautions during self-care and functional transfers.              Learner Progress:  Not documented in this visit.          Point: Body mechanics (Done)     Description:   Instruct learner(s) on proper positioning and spine alignment during self-care, functional mobility activities and/or exercises.              Learning Progress Summary           Patient Acceptance, E,D, VU,NR by GABRIELLE at 2/8/2021 1016    Comment: reason for consult, noted deficits, AB, pulmonary TE, recommendation of shower chair, why and where to purchase.                               User Key      Initials Effective Dates Name Provider Type Discipline     06/08/18 -  Stella Vargas OT Occupational Therapist OT              OT Recommendation and Plan  Planned Therapy Interventions (OT): activity tolerance training, BADL retraining, functional balance retraining, occupation/activity based interventions, patient/caregiver education/training, ROM/therapeutic exercise, transfer/mobility retraining  Therapy Frequency (OT): daily  Plan of Care Review  Plan of Care Reviewed With: patient  Progress: no change  Outcome Summary: OT evaluation completed.  Pt. demonstrating decreased strength core, impaired activity tolerance and mild decrease in balance impacting ADL independence warrantly continued skilled OT servcies.  Pt. SBA with extra time to EOB, 02 sate to 87%.  Pt. unable to elevate with AB well and maintain. 02 to 5L for activity.  Pt. stood CGA and mobilize around bed once 02 back to 92% on 5L.  Pt. 02 sats dropped to 83% after 12 ft to chair.  Several minutes to recover with AB so 02 could be lowered back to 2.5 L.  Pt. able to self feed post setup. If pt. able to tolerate lower 02 with good 02 sats with activity will likley be able to discharge home with HH OT/PT and dtr. assist, but if continues to struggle with 02 sat drop with activity may need SNF at discharge.  Shower chair recommended for home.     Time Calculation:   Time Calculation- OT     Row Name 02/08/21 1016             Time Calculation- OT    OT Start Time  0803  -GABRIELLE      OT Received On  02/08/21  -GABRIELLE      OT Goal Re-Cert Due Date  02/18/21  -GABRIELLE         Timed Charges    34423 - OT Therapeutic Exercise Minutes  8  -GABRIELLE      80768 - OT Therapeutic Activity Minutes  5  -GABRIELLE      90497 - OT Self Care/Mgmt Minutes  3  -GABRIELLE        User Key  (r) = Recorded By, (t) = Taken By, (c) = Cosigned By    Initials Name Provider Type    GABRIELLE Stella Vargas OT Occupational Therapist        Therapy Charges for Today     Code Description Service Date Service  Provider Modifiers Qty    51457758917 HC OT THER PROC EA 15 MIN 2/8/2021 Stella Vargas, OT GO 1    16950298135 HC OT EVAL LOW COMPLEXITY 3 2/8/2021 Stella Vargas, OT GO 1               Stella Vargas, OT  2/8/2021

## 2021-02-08 NOTE — PLAN OF CARE
Goal Outcome Evaluation:  Plan of Care Reviewed With: patient  Progress: improving  Outcome Summary: VSS. remaining on 2.5L NC. Patient encouraged to use incentive spirometer. No complaints of pain. fabian care completed, CHG bath completed. Fabian in place and patent. No complaints of SOA. continue POC.

## 2021-02-08 NOTE — PROGRESS NOTES
Continued Stay Note  [facility]     Patient Name: Anitra Dee  MRN: [id number]  Today's Date: 2/8/2021    Admit Date: 2/4/2021    Discharge Plan     Row Name 02/08/21 1419       Plan    Plan  update    Patient/Family in Agreement with Plan  yes    Plan Comments  Received a call from patient daughter, Skylar, who advises that her mother uses [facility].  CM following.    Final Discharge Disposition Code  06 - home with home health care    Row Name 02/08/21 1343       Plan    Plan  update    Patient/Family in Agreement with Plan  yes    Plan Comments  Spoke with patient via telephone regarding dicharge plan who reports that she is feeling some better and anticpates going home in the next day or two.  Per PT recs, patient ok to discharge home with HH services.  Patient reports she has used HH in the past but does not remember what agency it was but she really liked them.  Discussed postential need for home O2, patient denies DME preference.  No other discharge needs verbalized.  Called patient daughter, Skylar, regarding HH preferences who does not remember what agency she used but will call her PCP office and find out who they used and call back.  Patient is currently on [id number].  CM following.  Patient plan is to discharge home with HH via car with family to transport.    Final Discharge Disposition Code  06 - home with home health care        Discharge Codes    No documentation.             Basilia An RN

## 2021-02-09 ENCOUNTER — READMISSION MANAGEMENT (OUTPATIENT)
Dept: CALL CENTER | Facility: HOSPITAL | Age: 86
End: 2021-02-09

## 2021-02-09 VITALS
TEMPERATURE: 97.9 F | SYSTOLIC BLOOD PRESSURE: 157 MMHG | BODY MASS INDEX: 33.26 KG/M2 | HEART RATE: 69 BPM | WEIGHT: 165 LBS | HEIGHT: 59 IN | RESPIRATION RATE: 20 BRPM | DIASTOLIC BLOOD PRESSURE: 62 MMHG | OXYGEN SATURATION: 88 %

## 2021-02-09 LAB
ALBUMIN SERPL-MCNC: 2.7 G/DL (ref 3.5–5.2)
ALBUMIN/GLOB SERPL: 0.9 G/DL
ALP SERPL-CCNC: 67 U/L (ref 39–117)
ALT SERPL W P-5'-P-CCNC: 15 U/L (ref 1–33)
ANION GAP SERPL CALCULATED.3IONS-SCNC: 10 MMOL/L (ref 5–15)
AST SERPL-CCNC: 16 U/L (ref 1–32)
BASOPHILS # BLD AUTO: 0.02 10*3/MM3 (ref 0–0.2)
BASOPHILS NFR BLD AUTO: 0.2 % (ref 0–1.5)
BILIRUB SERPL-MCNC: 0.2 MG/DL (ref 0–1.2)
BUN SERPL-MCNC: 35 MG/DL (ref 8–23)
BUN/CREAT SERPL: 36.1 (ref 7–25)
BURR CELLS BLD QL SMEAR: NORMAL
CALCIUM SPEC-SCNC: 8.6 MG/DL (ref 8.6–10.5)
CHLORIDE SERPL-SCNC: 105 MMOL/L (ref 98–107)
CK SERPL-CCNC: 36 U/L (ref 20–180)
CO2 SERPL-SCNC: 20 MMOL/L (ref 22–29)
CREAT SERPL-MCNC: 0.97 MG/DL (ref 0.57–1)
CRP SERPL-MCNC: 0.99 MG/DL (ref 0–0.5)
D DIMER PPP FEU-MCNC: 10.69 MCGFEU/ML (ref 0–0.56)
DEPRECATED RDW RBC AUTO: 48.2 FL (ref 37–54)
ELLIPTOCYTES BLD QL SMEAR: NORMAL
EOSINOPHIL # BLD AUTO: 0.01 10*3/MM3 (ref 0–0.4)
EOSINOPHIL NFR BLD AUTO: 0.1 % (ref 0.3–6.2)
ERYTHROCYTE [DISTWIDTH] IN BLOOD BY AUTOMATED COUNT: 17.1 % (ref 12.3–15.4)
FERRITIN SERPL-MCNC: 139.8 NG/ML (ref 13–150)
FIBRINOGEN PPP-MCNC: 342 MG/DL (ref 220–470)
GFR SERPL CREATININE-BSD FRML MDRD: 55 ML/MIN/1.73
GLOBULIN UR ELPH-MCNC: 3.1 GM/DL
GLUCOSE SERPL-MCNC: 398 MG/DL (ref 65–99)
HCT VFR BLD AUTO: 33.9 % (ref 34–46.6)
HGB BLD-MCNC: 9.9 G/DL (ref 12–15.9)
HYPOCHROMIA BLD QL: NORMAL
IMM GRANULOCYTES # BLD AUTO: 0.22 10*3/MM3 (ref 0–0.05)
IMM GRANULOCYTES NFR BLD AUTO: 2.2 % (ref 0–0.5)
LARGE PLATELETS: NORMAL
LDH SERPL-CCNC: 250 U/L (ref 135–214)
LYMPHOCYTES # BLD AUTO: 0.61 10*3/MM3 (ref 0.7–3.1)
LYMPHOCYTES NFR BLD AUTO: 6 % (ref 19.6–45.3)
MCH RBC QN AUTO: 22.7 PG (ref 26.6–33)
MCHC RBC AUTO-ENTMCNC: 29.2 G/DL (ref 31.5–35.7)
MCV RBC AUTO: 77.6 FL (ref 79–97)
MONOCYTES # BLD AUTO: 0.39 10*3/MM3 (ref 0.1–0.9)
MONOCYTES NFR BLD AUTO: 3.8 % (ref 5–12)
NEUTROPHILS NFR BLD AUTO: 8.94 10*3/MM3 (ref 1.7–7)
NEUTROPHILS NFR BLD AUTO: 87.7 % (ref 42.7–76)
NEUTS VAC BLD QL SMEAR: NORMAL
NRBC BLD AUTO-RTO: 0 /100 WBC (ref 0–0.2)
PLATELET # BLD AUTO: 300 10*3/MM3 (ref 140–450)
PMV BLD AUTO: 10.9 FL (ref 6–12)
POTASSIUM SERPL-SCNC: 4.7 MMOL/L (ref 3.5–5.2)
PROT SERPL-MCNC: 5.8 G/DL (ref 6–8.5)
RBC # BLD AUTO: 4.37 10*6/MM3 (ref 3.77–5.28)
SODIUM SERPL-SCNC: 135 MMOL/L (ref 136–145)
WBC # BLD AUTO: 10.19 10*3/MM3 (ref 3.4–10.8)

## 2021-02-09 PROCEDURE — 86140 C-REACTIVE PROTEIN: CPT | Performed by: NURSE PRACTITIONER

## 2021-02-09 PROCEDURE — 82728 ASSAY OF FERRITIN: CPT | Performed by: NURSE PRACTITIONER

## 2021-02-09 PROCEDURE — 85025 COMPLETE CBC W/AUTO DIFF WBC: CPT | Performed by: NURSE PRACTITIONER

## 2021-02-09 PROCEDURE — 85007 BL SMEAR W/DIFF WBC COUNT: CPT | Performed by: NURSE PRACTITIONER

## 2021-02-09 PROCEDURE — 83615 LACTATE (LD) (LDH) ENZYME: CPT | Performed by: NURSE PRACTITIONER

## 2021-02-09 PROCEDURE — 85379 FIBRIN DEGRADATION QUANT: CPT | Performed by: NURSE PRACTITIONER

## 2021-02-09 PROCEDURE — 25010000002 HEPARIN (PORCINE) PER 1000 UNITS: Performed by: INTERNAL MEDICINE

## 2021-02-09 PROCEDURE — 82550 ASSAY OF CK (CPK): CPT | Performed by: NURSE PRACTITIONER

## 2021-02-09 PROCEDURE — 85384 FIBRINOGEN ACTIVITY: CPT | Performed by: NURSE PRACTITIONER

## 2021-02-09 PROCEDURE — 63710000001 DEXAMETHASONE PER 0.25 MG: Performed by: NURSE PRACTITIONER

## 2021-02-09 PROCEDURE — 99239 HOSP IP/OBS DSCHRG MGMT >30: CPT | Performed by: INTERNAL MEDICINE

## 2021-02-09 PROCEDURE — 80053 COMPREHEN METABOLIC PANEL: CPT | Performed by: NURSE PRACTITIONER

## 2021-02-09 RX ORDER — DEXAMETHASONE 6 MG/1
6 TABLET ORAL
Qty: 5 TABLET | Refills: 0 | Status: SHIPPED | OUTPATIENT
Start: 2021-02-10 | End: 2021-02-15

## 2021-02-09 RX ORDER — DEXAMETHASONE 6 MG/1
6 TABLET ORAL
Qty: 5 TABLET | Refills: 0 | Status: SHIPPED | OUTPATIENT
Start: 2021-02-10 | End: 2021-02-09

## 2021-02-09 RX ADMIN — DEXAMETHASONE 6 MG: 2 TABLET ORAL at 09:45

## 2021-02-09 RX ADMIN — HEPARIN SODIUM 5000 UNITS: 5000 INJECTION INTRAVENOUS; SUBCUTANEOUS at 09:44

## 2021-02-09 RX ADMIN — LABETALOL HYDROCHLORIDE 100 MG: 100 TABLET, FILM COATED ORAL at 13:25

## 2021-02-09 RX ADMIN — LOSARTAN POTASSIUM 50 MG: 50 TABLET, FILM COATED ORAL at 09:49

## 2021-02-09 RX ADMIN — ASPIRIN 81 MG: 81 TABLET, COATED ORAL at 09:45

## 2021-02-09 RX ADMIN — FERROUS SULFATE TAB 325 MG (65 MG ELEMENTAL FE) 325 MG: 325 (65 FE) TAB at 09:45

## 2021-02-09 RX ADMIN — LEVOTHYROXINE SODIUM 75 MCG: 75 TABLET ORAL at 06:33

## 2021-02-09 RX ADMIN — NYSTATIN: 100000 POWDER TOPICAL at 09:47

## 2021-02-09 RX ADMIN — POLYETHYLENE GLYCOL 3350 17 G: 17 POWDER, FOR SOLUTION ORAL at 09:46

## 2021-02-09 RX ADMIN — LABETALOL HYDROCHLORIDE 100 MG: 100 TABLET, FILM COATED ORAL at 06:33

## 2021-02-09 RX ADMIN — PANTOPRAZOLE SODIUM 40 MG: 40 TABLET, DELAYED RELEASE ORAL at 06:33

## 2021-02-09 RX ADMIN — GUAIFENESIN 600 MG: 600 TABLET, EXTENDED RELEASE ORAL at 09:45

## 2021-02-09 NOTE — DISCHARGE SUMMARY
[facility] Medicine Services  DISCHARGE SUMMARY    Patient Name: Anitra Dee  : 1935  MRN: [id number]    Date of Admission: 2021 11:47 AM  Date of Discharge: 2021  Primary Care Physician: Eleuterio Myers MD    Consults     No orders found from 2021 to 2021.          Hospital Course     Presenting Problem:   Pneumonia due to COVID-19 virus [U07.1, J12.82]  Pneumonia due to COVID-19 virus [U07.1, J12.82]    Active Hospital Problems    Diagnosis  POA   • **Pneumonia due to COVID-19 virus [U07.1, J12.82]  Yes   • Hypoxia [R09.02]  Yes   • Microcytic anemia [D50.9]  Yes   • Hypothyroid [E03.9]  Yes   • Hypertension [I10]  Yes   • GERD (gastroesophageal reflux disease) [K21.9]  Yes   • Asthma [J45.909]  Yes      Resolved Hospital Problems   No resolved problems to display.          Hospital Course:  Anitra Dee is a 85 y.o. female with hypothyroidism, HTN, GERD, asthma, diagnosed with COVID-19 approximately 8 days prior to admission.  She had been doing well on an outpatient basis and then progressively began to decline with worsening symptoms.  She ultimately presented to the ER for further evaluation and was admitted.  She will need to isolate through 2021 based on her first documented positive test.  She underwent CT angiography of the chest which was without PE but demonstrated findings consistent with COVID-19 pneumonia.  She will continue dexamethasone at discharge to complete x10 total days which will be completed on 2021.  She has completed x5 days of remdesivir 2021.  She is still needing some supplemental oxygen at the time of discharge which will be provided for her.  There is some question about the patient's ability to appropriately be cared for in the home setting in the immediate post hospitalization setting however patient and her family per their preference have elected to take her home with home health.  Her daughter from  [employer] will be taking the patient home with her to care for her as she recovers.  The patient was initially evaluated for UTI which was ruled out, culture returned sterile.  She has a history of allegedly recurrent UTIs, however per review of the chart it appears she has atrophic vaginitis and is not adherent with topical estrogens, which raises the question of if her UTIs are truly culture guided therapy or antibiotic use based on symptoms alone.  Based on the laboratory data available to me I believe she has CKD stage 3a as her GFR has remained in the 40s-50s for the remainder of her hospitalization.  She is discharging home with care family to complete her care and will follow up with her PCP once she has completed her isolation period.      Discharge Follow Up Recommendations for outpatient labs/diagnostics:  PCP 2 to 3 weeks to allow for completion of quarantine    COVID Symptom Date of Onset: 1/28/2021  Date of first positive COVID test: 1/28/2021  Quarantine Complete 20 days after date of onset: 2/17/2021      Day of Discharge     HPI:   Feels good today.  Has already spoken with her daughter on the telephone this morning and plans to go home with her.  Completed her remdesivir yesterday.  Nursing weaning down oxygen as tolerated however still requiring some    Review of Systems  Gen- No fevers, chills  CV- No chest pain, palpitations  Resp- No cough, dyspnea  GI- No N/V/D, abd pain    Vital Signs:   Temp:  [96.8 °F (36 °C)-97.9 °F (36.6 °C)] 97.9 °F (36.6 °C)  Heart Rate:  [60-74] 69  Resp:  [20] 20  BP: (150-165)/(62-72) 157/62     Physical Exam:  With patient's consent, physical exam was conducted via visual telemedicine encounter due to patient's current isolation requirements in the interest of PPE conservation.     Constitutional: No acute distress, awake, alert, nontoxic, obese body habitus, chronically ill-appearing, sitting up in bed  HENT: NCAT, no conjunctival injection  Respiratory: Good effort,  nonlabored respirations   Cardiovascular:  tele with NSR  Musculoskeletal: No edema, normal muscle tone and mass for age  Psychiatric: Appropriate affect, good insight and judgement, cooperative, mild cognitive delay  Neurologic: Movements symmetric BUE and BLE, speech clear and fluent  Skin: No visible rashes, no jaundice seen on exposed skin through window    Pertinent  and/or Most Recent Results     LAB RESULTS:      Lab 02/09/21  1213 02/09/21  0558 02/08/21  0615 02/07/21  1021 02/06/21  0637 02/05/21  0721 02/04/21  2235 02/04/21  1629 02/04/21  1216   WBC  --  10.19 12.41* 12.52* 10.97* 2.54*  --   --  4.17   HEMOGLOBIN  --  9.9* 9.6* 9.1* 9.6* 9.8*  --   --  9.0*   HEMATOCRIT  --  33.9* 32.3* 30.7* 32.2* 32.9*  --   --  30.5*   PLATELETS  --  300 331 326 298 261  --   --  224   NEUTROS ABS  --  8.94* 11.17* 11.64* 10.16* 2.14  --   --  3.37   IMMATURE GRANS (ABS)  --  0.22* 0.25*  --  0.10* 0.01  --   --  0.02   LYMPHS ABS  --  0.61* 0.61*  --  0.46* 0.30*  --   --  0.56*   MONOS ABS  --  0.39 0.37  --  0.24 0.08*  --   --  0.19   EOS ABS  --  0.01 0.00 0.00 0.00 0.00  --   --  0.02   MCV  --  77.6* 76.2* 76.8* 75.9* 75.8*  --   --  76.1*   CRP  --  0.99* 1.51* 2.34* 5.42* 10.51*  --   --   --    PROCALCITONIN  --   --   --   --   --   --  0.04  --  0.08   LACTATE  --   --   --   --   --   --   --  0.9 2.3*   *  --   --  308*  --  313* 374*  --  305*   D DIMER QUANT  --  10.69*  --  0.71*  --  0.72* 1.02*  --   --          Lab 02/09/21  1213 02/08/21  0615 02/07/21  1021 02/06/21 0637 02/05/21  0721   SODIUM 135* 136 136 139 138   POTASSIUM 4.7 4.4 4.7 4.6 3.9   CHLORIDE 105 106 104 104 103   CO2 20.0* 21.0* 23.0 25.0 24.0   ANION GAP 10.0 9.0 9.0 10.0 11.0   BUN 35* 42* 47* 28* 14   CREATININE 0.97 1.00 1.08* 1.08* 0.92   GLUCOSE 398* 198* 255* 170* 161*   CALCIUM 8.6 8.8 8.8 9.0 8.9         Lab 02/09/21  1213 02/08/21 0615 02/07/21  1021 02/06/21  0637 02/05/21  0721   TOTAL PROTEIN 5.8* 5.9* 6.2  6.7 7.0   ALBUMIN 2.70* 2.80* 2.80* 3.00* 3.10*   GLOBULIN 3.1 3.1 3.4 3.7 3.9   ALT (SGPT) 15 13 10 8 10   AST (SGOT) 16 22 19 21 22   BILIRUBIN 0.2 0.2 0.2 0.2 0.3   ALK PHOS 67 74 78 79 87         Lab 02/04/21  1216   PROBNP 2,336.0*   TROPONIN T <0.010             Lab 02/09/21  1213  02/04/21  1819 02/04/21  1216   IRON  --   --   --  21*   FERRITIN 139.80   < >  --  157.70*   FOLATE  --   --   --  18.10   VITAMIN B 12  --   --   --  857   ABO TYPING  --   --  B  --    RH TYPING  --   --  Positive  --    ANTIBODY SCREEN  --   --  Negative  --     < > = values in this interval not displayed.         Brief Urine Lab Results  (Last result in the past 365 days)      Color   Clarity   Blood   Leuk Est   Nitrite   Protein   CREAT   Urine HCG        02/04/21 1351 Yellow Turbid Negative Moderate (2+) Negative Trace             Microbiology Results (last 10 days)     Procedure Component Value - Date/Time    Blood Culture - Blood, Arm, Left [[id number]] Collected: 02/06/21 0819    Lab Status: Preliminary result Specimen: Blood from Arm, Left Updated: 02/09/21 0901     Blood Culture No growth at 3 days    Blood Culture - Blood, Hand, Left [[id number]] Collected: 02/06/21 0819    Lab Status: Preliminary result Specimen: Blood from Hand, Left Updated: 02/09/21 0901     Blood Culture No growth at 3 days    Urine Culture - Urine, Urine, Catheter [[id number]] Collected: 02/05/21 1403    Lab Status: Final result Specimen: Urine, Catheter Updated: 02/06/21 1011     Urine Culture >100,000 CFU/mL Mixed Aria Isolated    Narrative:      Specimen contains mixed organisms of questionable pathogenicity which indicates contamination with commensal aria.  Further identification is unlikely to provide clinically useful information.  Suggest recollection.    COVID PRE-OP / PRE-PROCEDURE SCREENING ORDER (NO ISOLATION) - Swab, Nasopharynx [[id number]]  (Abnormal) Collected: 02/04/21 1456    Lab Status: Final result Specimen: Swab from  Nasopharynx Updated: 02/04/21 1633    Narrative:      The following orders were created for panel order COVID PRE-OP / PRE-PROCEDURE SCREENING ORDER (NO ISOLATION) - Swab, Nasopharynx.  Procedure                               Abnormality         Status                     ---------                               -----------         ------                     Respiratory Panel PCR w/...[[id number]]  Abnormal            Final result                 Please view results for these tests on the individual orders.    Respiratory Panel PCR w/COVID-19(SARS-CoV-2) FARRAH/ROZ/SUZY/PAD/COR/MAD/TAD In-House, NP Swab in UTM/VTM, 3-4 HR TAT - Swab, Nasopharynx [[id number]]  (Abnormal) Collected: 02/04/21 1456    Lab Status: Final result Specimen: Swab from Nasopharynx Updated: 02/04/21 1633     ADENOVIRUS, PCR Not Detected     Coronavirus 229E Not Detected     Coronavirus HKU1 Not Detected     Coronavirus NL63 Not Detected     Coronavirus OC43 Not Detected     COVID19 Detected     Human Metapneumovirus Not Detected     Human Rhinovirus/Enterovirus Not Detected     Influenza A PCR Not Detected     Influenza A H1 Not Detected     Influenza A H1 2009 PCR Not Detected     Influenza A H3 Not Detected     Influenza B PCR Not Detected     Parainfluenza Virus 1 Not Detected     Parainfluenza Virus 2 Not Detected     Parainfluenza Virus 3 Not Detected     Parainfluenza Virus 4 Not Detected     RSV, PCR Not Detected     Bordetella pertussis pcr Not Detected     Bordetella parapertussis PCR Not Detected     Chlamydophila pneumoniae PCR Not Detected     Mycoplasma pneumo by PCR Not Detected    Narrative:      Fact sheet for providers: https://docs.[employer]/wp-content/uploads/HNY4320-3545-GN5.1-EUA-Provider-Fact-Sheet-3.pdf    Fact sheet for patients: https://docs.[employer]/wp-content/uploads/QYM9098-3036-GM6.1-EUA-Patient-Fact-Sheet-1.pdf    Test performed by PCR.    Blood Culture - Blood, Arm, Left [[id number]]  (Abnormal) Collected:  02/04/21 1310    Lab Status: Final result Specimen: Blood from Arm, Left Updated: 02/06/21 0638     Blood Culture Staphylococcus, coagulase negative     Comment: Probable contaminant requires clinical correlation, susceptibility not performed unless requested by physician.          Isolated from Aerobic Bottle     Gram Stain Aerobic Bottle Gram positive cocci in groups    Blood Culture ID, PCR - Blood, Arm, Left [[id number]]  (Abnormal) Collected: 02/04/21 1310    Lab Status: Final result Specimen: Blood from Arm, Left Updated: 02/05/21 1334     BCID, PCR Staphylococcus spp, not aureus. Identification by BCID PCR.    Blood Culture - Blood, Arm, Right [[id number]]  (Abnormal) Collected: 02/04/21 1305    Lab Status: Final result Specimen: Blood from Arm, Right Updated: 02/05/21 1047     Blood Culture Bacillus species     Comment: Probable contaminant requires clinical correlation, susceptibility not performed unless requested by physician.          Isolated from Aerobic Bottle     Gram Stain Aerobic Bottle Gram positive bacilli    Narrative:      Blood culture does not meet the specified criteria for PCR identification.  If pregnant, immunocompromised, or clinical concern for meningitis, call lab to run BCID for Listeria monocytogenes.          Xr Chest 1 View    Result Date: 2/4/2021  EXAMINATION: XR CHEST 1 VW-  INDICATION: Shortness of air triage protocol.  COMPARISON: None.  FINDINGS: Single AP view of the chest reveals cardiac size borderline enlarged. Bilateral patchy opacifications within the mid and lower lungs consistent with airspace disease such as bronchopneumonia. No pneumothorax or pleural effusion. Degenerative changes of the spine.         Opacifications throughout the mid and lower lungs of a peripheral predominance consistent with acute airspace disease such as bronchopneumonia. No pleural effusion.  D:  02/04/2021 E:  02/04/2021  This report was finalized on 2/4/2021 4:55 PM by Dr. Anton Glass.       Ct Angiogram Chest    Result Date: 2/4/2021  CTA Chest INDICATION: Respiratory failure. COVID 19 pneumonia. History of COPD. TECHNIQUE: CT angiogram of the chest with IV contrast. 3-D reconstructions were obtained and reviewed.   Radiation dose reduction techniques included automated exposure control or exposure modulation based on body size. Count of known CT and cardiac nuc med studies performed in previous 12 months: 0. COMPARISON: None available. FINDINGS: Peripheral groundglass opacities most prominent within the posterior aspect right upper lobe and posterior aspects of both lower lobes. No dense consolidation. Small amount of right apical scarring. No effusions. Trachea and bronchi are unremarkable. No evidence of pulmonary embolus. Diffuse aortic atherosclerotic changes. Cardiomegaly. Calcified right hilar nodes compatible with prior granulomatous disease. Hepatic and splenic granulomas. Multiple cholesterol gallstones but no evidence of acute cholecystitis.     Multiple predominantly peripheral groundglass opacities compatible with history of COVID pneumonia. No evidence of pulmonary embolus. Cardiomegaly without failure. Uncomplicated cholelithiasis. Signer Name: SKYE Mathew MD  Signed: 2/4/2021 8:21 PM  Workstation Name: [facility]  [facility]      Results for orders placed during the hospital encounter of 08/09/19   Duplex Carotid Ultrasound CAR    Narrative · Right internal carotid artery stenosis of 0-49%.  · Proximal left internal carotid artery is normal.          Results for orders placed during the hospital encounter of 08/09/19   Duplex Carotid Ultrasound CAR    Narrative · Right internal carotid artery stenosis of 0-49%.  · Proximal left internal carotid artery is normal.          Results for orders placed during the hospital encounter of 08/09/19   Transthoracic Echo Complete With Contrast if Necessary Per Protocol    Narrative · Estimated EF = 65%.  · Mild  mitral valve regurgitation is present  · Moderate tricuspid valve regurgitation is present.  · Calculated right ventricular systolic pressure from tricuspid   regurgitation is 51 mmHg.           Pending Labs     Order Current Status    Blood Culture - Blood, Arm, Left Preliminary result    Blood Culture - Blood, Hand, Left Preliminary result        Discharge Details        Discharge Medications      New Medications      Instructions Start Date   dexamethasone 6 MG tablet  Commonly known as: DECADRON   6 mg, Oral, Daily With Breakfast   Start Date: February 10, 2021        Continue These Medications      Instructions Start Date   aspirin 81 MG tablet   81 mg, Oral, Daily      labetalol 100 MG tablet  Commonly known as: NORMODYNE   100 mg, Oral, 2 Times Daily      levothyroxine 75 MCG tablet  Commonly known as: SYNTHROID, LEVOTHROID   75 mcg, Oral, Daily      losartan 100 MG tablet  Commonly known as: COZAAR   100 mg, Oral, Daily      methenamine 1 g tablet  Commonly known as: HIPREX   1 g, Oral, Daily      omeprazole 40 MG capsule  Commonly known as: priLOSEC   40 mg, Oral, Daily      umeclidinium-vilanterol 62.5-25 MCG/INH aerosol powder  inhaler  Commonly known as: ANORO ELLIPTA   1 puff, Inhalation, Daily - RT             Allergies   Allergen Reactions   • Latex Hives         Discharge Disposition:  Home-Health Care [facility]    Diet:  Hospital:  Diet Order   Procedures   • Diet Regular; Thin; Cardiac          CODE STATUS:    Code Status and Medical Interventions:   Ordered at: 02/04/21 1744     Code Status:    CPR     Medical Interventions (Level of Support Prior to Arrest):    Full       No future appointments.    Additional Instructions for the Follow-ups that You Need to Schedule     Ambulatory Referral to Home Health   As directed      Face to Face Visit Date: 2/9/2021    Follow-up provider for Plan of Care?: I treated the patient in an acute care facility and will not continue treatment after discharge.     Follow-up provider: GIOVANNI TUCKER [[id number]]    Reason/Clinical Findings: COVID, Hypoxia    Describe mobility limitations that make leaving home difficult: wekaness, impaired physical mobility, impaired functional mobility    Nursing/Therapeutic Services Requested: Skilled Nursing Physical Therapy Occupational Therapy    Skilled nursing orders: Medication education O2 instruction Cardiopulmonary assessments    PT orders: Therapeutic exercise Transfer training Strengthening Gait Training Home safety assessment    Weight Bearing Status: As Tolerated    Occupational orders: Home safety assessment Activities of daily living Energy conservation Strengthening         Discharge Follow-up with PCP   As directed       Currently Documented PCP:    Giovanni Tucker MD    PCP Phone Number:    [phone]     Follow Up Details: 1-2 weeks, needs to be after 2/17/21 to allow completion of quarantine                     Mario Xie DO  02/09/21      Time Spent on Discharge:  I spent  34  minutes on this discharge activity which included: face-to-face encounter with the patient, reviewing the data in the system, coordination of the care with the nursing staff as well as consultants, documentation, and entering orders.

## 2021-02-09 NOTE — PROGRESS NOTES
Case Management Discharge Note      Final Note: Received a call from patient daughter, Skylar, who has spoken with the MD and has decided to have her mother discharge to her sister, Benji, house today with [facility].  A Pulse Oximeter will be provided to patient today to take home with her.  Request [facility] for oxygen and [facility].  No other discharge needs verbalized.  Called [facility] [phone] who request orders be faxed to [phone] and can deliver Oxygen to patient room today.  Called [facility] [phone] and spoke to Marco who request orders and clinical documentation be faxed to [phone].  Patient plan is to discharge home to her daughter, Benji, house today with [facility] via car with family to transport.    Provided Post Acute Provider List?: N/A  N/A Provider List Comment: needs unknown at this time    Selected Continued Care - Admitted Since 2/4/2021     Destination    No services have been selected for the patient.              Durable Medical Equipment    No services have been selected for the patient.              Dialysis/Infusion    No services have been selected for the patient.              Home Medical Care    No services have been selected for the patient.              Therapy    No services have been selected for the patient.              Community Resources    No services have been selected for the patient.                       Final Discharge Disposition Code: 06 - home with home health care

## 2021-02-09 NOTE — DISCHARGE PLACEMENT REQUEST
"KAUR Mack, RN  Case Management  [phone]      Anitra Robertson (85 y.o. Female)     Date of Birth Social Security Number Address Home Phone MRN    1935  [id number] [facility] [address] [phone] [id number]    Advent Marital Status          Islam        Admission Date Admission Type Admitting Provider Attending Provider Department, Room/Bed    2/4/21 Emergency Mario Xie DO Shields, Daniel Alan, DO [facility] 6B, N639/1    Discharge Date Discharge Disposition Discharge Destination         Home-Health Care [facility]              Attending Provider: Mario Xie DO    Allergies: Latex    Isolation: Contact Air, Enh Drop/Con   Infection: COVID (confirmed) (02/04/21)   Code Status: CPR    Ht: 149.9 cm (59\")   Wt: 74.8 kg (165 lb)    Admission Cmt: None   Principal Problem: Pneumonia due to COVID-19 virus [U07.1,J12.82]                 Active Insurance as of 2/4/2021     Primary Coverage     Payor Plan Insurance Group Employer/Plan Group    MEDICARE MEDICARE A & B      Payor Plan Address Payor Plan Phone Number Payor Plan Fax Number Effective Dates    [address] [phone]  11/1/2002 - None Entered    [facility] [address]       Subscriber Name Subscriber Birth Date Member ID       ANITRA ROBERTSON 1935 [id number]           Secondary Coverage     Payor Plan Insurance Group Employer/Plan Group    [employer] SUP AAR HEALTH CARE OPTIONS      Payor Plan Address Payor Plan Phone Number Payor Plan Fax Number Effective Dates    [facility] [phone]  1/1/2017 - None Entered    [address]       [facility] [address]       Subscriber Name Subscriber Birth Date Member ID       ANITRA ROBERTSON 1935 [id number]                 Emergency Contacts      (Rel.) Home Phone Work Phone Mobile Phone    Skylar Wynn (Daughter) [phone] -- --    DenilsonCharito (Daughter) [phone] -- --    Shawn Gabriel (Son) [phone] -- " --            Insurance Information                MEDICARE/MEDICARE A & B Phone: [phone]    Subscriber: Anitra Dee Subscriber#: [id number]    Group#:  Precert#:         [facility] SUP/[facility] HEALTH CARE OPTIONS Phone: [phone]    Subscriber: Anitra Dee Subscriber#: [id number]    Group#:  Precert#:              History & Physical      Eula Stevens MD at 21 1733              [facility] Medicine Services  HISTORY AND PHYSICAL    Patient Name: Anitra Dee  : 1935  MRN: [id number]  Primary Care Physician: Eleuterio Myers MD  Date of admission: 2021    Subjective   Subjective     Chief Complaint:  SOB     HPI:  Anitra Dee is a 85 y.o. female with pmh of hypothyroidism, htn, GERD, asthma, arthritis, and PNA. Patient presented to [facility] ED with complaints of progressive SOB. She was diagnosed with COVID 8 days ago. She had been doing ok until 2 days ago when she started having increasing sob. She has a 02 sat monitor at home and her oxygen level has been  Between 86-91%. She has not had any outpatient treatment for COVID. She has lost taste and smell. She had one episode of diarrhea.     In ED: room air 84%, bnp 2336, creat 1.04, lactate 2.3-0.9, procal 0.08, hgb 9.0,       Current COVID Risks are:  [] Fever [x]  Cough [x] Shortness of breath [] Fatigue [] Change in taste or smell    [] Exposure to COVID positive patient  [] High risk facility   []  NONE    Review of Systems   Constitutional: Negative for chills and fever.   Respiratory: Positive for shortness of breath.    Cardiovascular: Negative for chest pain.   Genitourinary: Negative for dysuria.        All other systems reviewed and are negative.     Personal History     Past Medical History:   Diagnosis Date   • Arthritis    • Asthma    • COVID-19    • GERD (gastroesophageal reflux disease)    • Hypertension    • Hypothyroid    • Pneumonia    • Post menopausal syndrome        Past  Surgical History:   Procedure Laterality Date   • HYSTERECTOMY     • KNEE ARTHROPLASTY     • REPAIR PELVIC FLOOR DEFECT VAGINAL APPROACH W/ MESH     • SHOULDER ARTHROTOMY         Family History: family history includes Cancer in her mother; Lung disease in her mother; Other in her father. Otherwise pertinent FHx was reviewed and unremarkable.     Social History:  reports that she has quit smoking. She has never used smokeless tobacco. She reports that she does not drink alcohol or use drugs.  She is     Medications:  aspirin, labetalol, levothyroxine, losartan, methenamine, omeprazole, and umeclidinium-vilanterol    Allergies   Allergen Reactions   • Latex Hives       Objective   Objective     Vital Signs:   Temp:  [97.9 °F (36.6 °C)] 97.9 °F (36.6 °C)  Heart Rate:  [61-75] 65  Resp:  [20-22] 22  BP: (147-197)/(56-78) 186/67  Flow (L/min):  [2.5] 2.5    Physical Exam   Patient is alert and talkative in no distress at rest, non toxic  Neck is without mass or JVD  Heart is Reg wo murmur  Lungs are unlabored until she coughs  Abd is obese, not distended  MAEW  Skin is without rash  Neurologic exam in nonfocal   Mood is appropriate      Results Reviewed:  I have personally reviewed most recent indicated data and agree with findings including:  [x]  Laboratory  [x]  Radiology  [x]  EKG/Telemetry  []  Pathology  []  Cardiac/Vascular Studies  [x]  Old records  []  Other:  Most pertinent findings include:new anemia, elevated inflammatory markers    LAB RESULTS:      Lab 02/04/21  1629 02/04/21  1216   WBC  --  4.17   HEMOGLOBIN  --  9.0*   HEMATOCRIT  --  30.5*   PLATELETS  --  224   NEUTROS ABS  --  3.37   IMMATURE GRANS (ABS)  --  0.02   LYMPHS ABS  --  0.56*   MONOS ABS  --  0.19   EOS ABS  --  0.02   MCV  --  76.1*   PROCALCITONIN  --  0.08   LACTATE 0.9 2.3*   LDH  --  305*         Lab 02/04/21  1216   SODIUM 139   POTASSIUM 4.3   CHLORIDE 103   CO2 28.0   ANION GAP 8.0   BUN 16   CREATININE 1.04*   GLUCOSE 133*    CALCIUM 8.8         Lab 02/04/21  1216   TOTAL PROTEIN 6.6   ALBUMIN 3.10*   GLOBULIN 3.5   ALT (SGPT) 8   AST (SGOT) 25   BILIRUBIN 0.3   ALK PHOS 76         Lab 02/04/21  1216   PROBNP 2,336.0*   TROPONIN T <0.010                 Brief Urine Lab Results  (Last result in the past 365 days)      Color   Clarity   Blood   Leuk Est   Nitrite   Protein   CREAT   Urine HCG        02/04/21 1351 Yellow Turbid Negative Moderate (2+) Negative Trace             Microbiology Results (last 10 days)     Procedure Component Value - Date/Time    COVID PRE-OP / PRE-PROCEDURE SCREENING ORDER (NO ISOLATION) - Swab, Nasopharynx [[id number]]  (Abnormal) Collected: 02/04/21 1456    Lab Status: Final result Specimen: Swab from Nasopharynx Updated: 02/04/21 1633    Narrative:      The following orders were created for panel order COVID PRE-OP / PRE-PROCEDURE SCREENING ORDER (NO ISOLATION) - Swab, Nasopharynx.  Procedure                               Abnormality         Status                     ---------                               -----------         ------                     Respiratory Panel PCR w/...[[id number]]  Abnormal            Final result                 Please view results for these tests on the individual orders.    Respiratory Panel PCR w/COVID-19(SARS-CoV-2) FARRAH/ROZ/SUZY/PAD/COR/MAD/TAD In-House, NP Swab in UTM/VTM, 3-4 HR TAT - Swab, Nasopharynx [[id number]]  (Abnormal) Collected: 02/04/21 1456    Lab Status: Final result Specimen: Swab from Nasopharynx Updated: 02/04/21 1633     ADENOVIRUS, PCR Not Detected     Coronavirus 229E Not Detected     Coronavirus HKU1 Not Detected     Coronavirus NL63 Not Detected     Coronavirus OC43 Not Detected     COVID19 Detected     Human Metapneumovirus Not Detected     Human Rhinovirus/Enterovirus Not Detected     Influenza A PCR Not Detected     Influenza A H1 Not Detected     Influenza A H1 2009 PCR Not Detected     Influenza A H3 Not Detected     Influenza B PCR Not Detected      Parainfluenza Virus 1 Not Detected     Parainfluenza Virus 2 Not Detected     Parainfluenza Virus 3 Not Detected     Parainfluenza Virus 4 Not Detected     RSV, PCR Not Detected     Bordetella pertussis pcr Not Detected     Bordetella parapertussis PCR Not Detected     Chlamydophila pneumoniae PCR Not Detected     Mycoplasma pneumo by PCR Not Detected    Narrative:      Fact sheet for providers: https://docs.[employer]/wp-content/uploads/RJH9553-9678-EM9.1-EUA-Provider-Fact-Sheet-3.pdf    Fact sheet for patients: https://docs.[employer]/wp-content/uploads/YDA5080-1899-DI7.1-EUA-Patient-Fact-Sheet-1.pdf    Test performed by PCR.          Xr Chest 1 View    Result Date: 2/4/2021  EXAMINATION: XR CHEST 1 VW-  INDICATION: Shortness of air triage protocol.  COMPARISON: None.  FINDINGS: Single AP view of the chest reveals cardiac size borderline enlarged. Bilateral patchy opacifications within the mid and lower lungs consistent with airspace disease such as bronchopneumonia. No pneumothorax or pleural effusion. Degenerative changes of the spine.         Impression: Opacifications throughout the mid and lower lungs of a peripheral predominance consistent with acute airspace disease such as bronchopneumonia. No pleural effusion.  D:  02/04/2021 E:  02/04/2021  This report was finalized on 2/4/2021 4:55 PM by Dr. Anton Glass.        Results for orders placed during the hospital encounter of 08/09/19   Transthoracic Echo Complete With Contrast if Necessary Per Protocol    Narrative · Estimated EF = 65%.  · Mild mitral valve regurgitation is present  · Moderate tricuspid valve regurgitation is present.  · Calculated right ventricular systolic pressure from tricuspid   regurgitation is 51 mmHg.          Assessment/Plan   Assessment & Plan       Pneumonia due to COVID-19 virus    Hypertension    Hypothyroid    GERD (gastroesophageal reflux disease)    Asthma    Microcytic anemia      COVID-19 PNA  -- cont  Dexamethasone  for 10 days  -- start  remdesivir and pharmacy to dose  -- oxygen prn to keep sat > 90%  -- prn albuterol and cough suppressants     Worsening anemia  -iron deficient, check occult stool  - further workup once respiratory status more stable  -watch closely heparin    Hypothyroidism  -- cont home meds     GERD  -- cont ppi     HTN  -- cont labetalol and cozaar   -- have tried IV labetalol tongith without much success, adding nitropaste and cardene if needed.      DVT prophylaxis:  lovenox       CODE STATUS:  Full code   Code Status and Medical Interventions:   Ordered at: 02/04/21 0157     Code Status:    CPR     Medical Interventions (Level of Support Prior to Arrest):    Full         This note has been completed as part of a split-shared workflow.   Note initiated and   Electronically signed by NAA Lund, 02/04/21, 5:33 PM EST.  Patient seen and examined by Electronically signed by Eula Stevens MD, 02/04/21, 10:36 PM EST.                  Electronically signed by Eula Stevens MD at 02/05/21 0135         Current Facility-Administered Medications   Medication Dose Route Frequency Provider Last Rate Last Admin   • albuterol sulfate HFA (PROVENTIL HFA;VENTOLIN HFA;PROAIR HFA) inhaler 2 puff  2 puff Inhalation Q6H PRN Elva Fragoso APRN   2 puff at 02/05/21 0424   • aspirin EC tablet 81 mg  81 mg Oral Daily Elva Fragoso APRN   81 mg at 02/09/21 0945   • benzonatate (TESSALON) capsule 100 mg  100 mg Oral TID PRN Elva Fragoso APRN   100 mg at 02/05/21 0919   • dexamethasone (DECADRON) tablet 6 mg  6 mg Oral Daily With Breakfast Elva Frgaoso APRN   6 mg at 02/09/21 0945    Or   • dexamethasone (DECADRON) injection 6 mg  6 mg Intravenous Daily With Breakfast Elva Fragoso APRN       • diazePAM (VALIUM) tablet 2 mg  2 mg Oral Q12H PRN Sherrie Garcia MD   2 mg at 02/08/21 2049   • ferrous sulfate tablet 325 mg  325 mg Oral Daily With Breakfast Sherrie Garcia MD    325 mg at 21 0945   • guaiFENesin (MUCINEX) 12 hr tablet 600 mg  600 mg Oral Q12H Sherrie Garcia MD   600 mg at 21 0945   • heparin (porcine) 5000 UNIT/ML injection 5,000 Units  5,000 Units Subcutaneous Q12H Eula Stevens MD   5,000 Units at 21 0944   • labetalol (NORMODYNE) tablet 100 mg  100 mg Oral Q8H Eula Stevens MD   100 mg at 21 1325   • labetalol (NORMODYNE,TRANDATE) injection 20 mg  20 mg Intravenous Q4H PRN Eula Stevens MD   20 mg at 21 1236   • levothyroxine (SYNTHROID, LEVOTHROID) tablet 75 mcg  75 mcg Oral Q AM Elva Fragoso APRN   75 mcg at 21 0633   • losartan (COZAAR) tablet 50 mg  50 mg Oral Daily Mario Xie DO   50 mg at 21 0949   • nitroglycerin (NITROSTAT) ointment 0.5 inch  0.5 inch Topical Q6H PRN Eula Stevens MD   0.5 inch at 21 0210   • nystatin (MYCOSTATIN) powder   Topical Q12H Saima Renee PA-C   Given at 21 0947   • pantoprazole (PROTONIX) EC tablet 40 mg  40 mg Oral QAM Elva Fragoso APRN   40 mg at 21 0633   • Pharmacy Consult - Remdesivir   Does not apply Continuous PRN Eula Stevens MD       • Pharmacy Meds to Bed Consult   Does not apply Daily Mario Xie DO       • polyethylene glycol (MIRALAX) packet 17 g  17 g Oral Daily Sherrie Garcia MD   17 g at 21 0946   • sodium chloride 0.9 % bolus 500 mL  500 mL Intravenous Once Eula Stevens MD       • sodium chloride 0.9 % flush 10 mL  10 mL Intravenous PRN Juan Diego Jenkins DO   10 mL at 21 0902        Physician Progress Notes (most recent note)      Mario Xie DO at 21 1742              [facility] Medicine Services  PROGRESS NOTE    Patient Name: Anitra Dee  : 1935  MRN: [id number]    Date of Admission: 2021  Primary Care Physician: Eleuterio Myers MD    Subjective   Subjective     CC:  Follow-up Covid pneumonia    HPI:  Feels like  "she is just getting started for the day.  Breathing feels \"tight.\"  No sputum production.  No events overnight    ROS:  Gen- No fevers, chills  CV- No chest pain, palpitations  Resp- No cough, yes dyspnea  GI- No N/V/D, abd pain    Objective   Objective     Vital Signs:   Temp:  [96.7 °F (35.9 °C)-97.1 °F (36.2 °C)] 96.7 °F (35.9 °C)  Heart Rate:  [58-74] 65  Resp:  [20-22] 20  BP: (120-136)/(55-64) 132/57        Physical Exam:  With patient's consent, physical exam was conducted via visual telemedicine encounter due to patient's current isolation requirements in the interest of PPE conservation.    Constitutional: No acute distress, awake, alert, nontoxic, obese body habitus, chronically ill-appearing  HENT: NCAT, no conjunctival injection  Respiratory: Good effort, nonlabored respirations   Cardiovascular:  tele with NSR  Musculoskeletal: No edema, normal muscle tone and mass for age  Psychiatric: Appropriate affect, good insight and judgement, cooperative, mild cognitive delay  Neurologic: Movements symmetric BUE and BLE, speech clear and fluent  Skin: No visible rashes, no jaundice seen on exposed skin through window      Results Reviewed:  Results from last 7 days   Lab Units 02/08/21 0615 02/07/21  1021 02/06/21 0637 02/04/21  2235 02/04/21  1216   WBC 10*3/mm3 12.41* 12.52* 10.97*   < >  --  4.17   HEMOGLOBIN g/dL 9.6* 9.1* 9.6*   < >  --  9.0*   HEMATOCRIT % 32.3* 30.7* 32.2*   < >  --  30.5*   PLATELETS 10*3/mm3 331 326 298   < >  --  224   PROCALCITONIN ng/mL  --   --   --   --  0.04 0.08    < > = values in this interval not displayed.     Results from last 7 days   Lab Units 02/08/21 0615 02/07/21  1021 02/06/21  0637 02/04/21  1216   SODIUM mmol/L 136 136 139   < > 139   POTASSIUM mmol/L 4.4 4.7 4.6   < > 4.3   CHLORIDE mmol/L 106 104 104   < > 103   CO2 mmol/L 21.0* 23.0 25.0   < > 28.0   BUN mg/dL 42* 47* 28*   < > 16   CREATININE mg/dL 1.00 1.08* 1.08*   < > 1.04*   GLUCOSE mg/dL 198* 255* 170*   " < > 133*   CALCIUM mg/dL 8.8 8.8 9.0   < > 8.8   ALT (SGPT) U/L 13 10 8   < > 8   AST (SGOT) U/L 22 19 21   < > 25   TROPONIN T ng/mL  --   --   --   --  <0.010   PROBNP pg/mL  --   --   --   --  2,336.0*    < > = values in this interval not displayed.     Estimated Creatinine Clearance: 39.8 mL/min (by C-G formula based on SCr of 1 mg/dL).    Microbiology Results Abnormal     Procedure Component Value - Date/Time    Blood Culture - Blood, Arm, Left [[id number]] Collected: 02/06/21 0819    Lab Status: Preliminary result Specimen: Blood from Arm, Left Updated: 02/08/21 0900     Blood Culture No growth at 2 days    Blood Culture - Blood, Hand, Left [[id number]] Collected: 02/06/21 0819    Lab Status: Preliminary result Specimen: Blood from Hand, Left Updated: 02/08/21 0900     Blood Culture No growth at 2 days    Urine Culture - Urine, Urine, Catheter [[id number]] Collected: 02/05/21 1403    Lab Status: Final result Specimen: Urine, Catheter Updated: 02/06/21 1011     Urine Culture >100,000 CFU/mL Mixed Aria Isolated    Narrative:      Specimen contains mixed organisms of questionable pathogenicity which indicates contamination with commensal aria.  Further identification is unlikely to provide clinically useful information.  Suggest recollection.    Blood Culture - Blood, Arm, Left [[id number]]  (Abnormal) Collected: 02/04/21 1310    Lab Status: Final result Specimen: Blood from Arm, Left Updated: 02/06/21 0638     Blood Culture Staphylococcus, coagulase negative     Comment: Probable contaminant requires clinical correlation, susceptibility not performed unless requested by physician.          Isolated from Aerobic Bottle     Gram Stain Aerobic Bottle Gram positive cocci in groups    Blood Culture ID, PCR - Blood, Arm, Left [[id number]]  (Abnormal) Collected: 02/04/21 1310    Lab Status: Final result Specimen: Blood from Arm, Left Updated: 02/05/21 1334     BCID, PCR Staphylococcus spp, not aureus. Identification by  BCID PCR.    Blood Culture - Blood, Arm, Right [[id number]]  (Abnormal) Collected: 02/04/21 1305    Lab Status: Final result Specimen: Blood from Arm, Right Updated: 02/05/21 1047     Blood Culture Bacillus species     Comment: Probable contaminant requires clinical correlation, susceptibility not performed unless requested by physician.          Isolated from Aerobic Bottle     Gram Stain Aerobic Bottle Gram positive bacilli    Narrative:      Blood culture does not meet the specified criteria for PCR identification.  If pregnant, immunocompromised, or clinical concern for meningitis, call lab to run BCID for Listeria monocytogenes.    COVID PRE-OP / PRE-PROCEDURE SCREENING ORDER (NO ISOLATION) - Swab, Nasopharynx [[id number]]  (Abnormal) Collected: 02/04/21 1456    Lab Status: Final result Specimen: Swab from Nasopharynx Updated: 02/04/21 1633    Narrative:      The following orders were created for panel order COVID PRE-OP / PRE-PROCEDURE SCREENING ORDER (NO ISOLATION) - Swab, Nasopharynx.  Procedure                               Abnormality         Status                     ---------                               -----------         ------                     Respiratory Panel PCR w/...[[id number]]  Abnormal            Final result                 Please view results for these tests on the individual orders.    Respiratory Panel PCR w/COVID-19(SARS-CoV-2) FARRAH/ROZ/SUZY/PAD/COR/MAD/TAD In-House, NP Swab in UTM/VTM, 3-4 HR TAT - Swab, Nasopharynx [[id number]]  (Abnormal) Collected: 02/04/21 1456    Lab Status: Final result Specimen: Swab from Nasopharynx Updated: 02/04/21 1633     ADENOVIRUS, PCR Not Detected     Coronavirus 229E Not Detected     Coronavirus HKU1 Not Detected     Coronavirus NL63 Not Detected     Coronavirus OC43 Not Detected     COVID19 Detected     Human Metapneumovirus Not Detected     Human Rhinovirus/Enterovirus Not Detected     Influenza A PCR Not Detected     Influenza A H1 Not Detected      Influenza A H1 2009 PCR Not Detected     Influenza A H3 Not Detected     Influenza B PCR Not Detected     Parainfluenza Virus 1 Not Detected     Parainfluenza Virus 2 Not Detected     Parainfluenza Virus 3 Not Detected     Parainfluenza Virus 4 Not Detected     RSV, PCR Not Detected     Bordetella pertussis pcr Not Detected     Bordetella parapertussis PCR Not Detected     Chlamydophila pneumoniae PCR Not Detected     Mycoplasma pneumo by PCR Not Detected    Narrative:      Fact sheet for providers: https://docs."[employer]/wp-content/uploads/ZHL1302-5845-IJ7.1-EUA-Provider-Fact-Sheet-3.pdf    Fact sheet for patients: https://docs."[employer]/wp-content/uploads/FSM5973-0698-PL5.1-EUA-Patient-Fact-Sheet-1.pdf    Test performed by PCR.          Imaging Results (Last 24 Hours)     ** No results found for the last 24 hours. **          Results for orders placed during the hospital encounter of 08/09/19   Transthoracic Echo Complete With Contrast if Necessary Per Protocol    Narrative · Estimated EF = 65%.  · Mild mitral valve regurgitation is present  · Moderate tricuspid valve regurgitation is present.  · Calculated right ventricular systolic pressure from tricuspid   regurgitation is 51 mmHg.          I have reviewed the medications:  Scheduled Meds:aspirin, 81 mg, Oral, Daily  dexamethasone, 6 mg, Oral, Daily With Breakfast    Or  dexamethasone, 6 mg, Intravenous, Daily With Breakfast  ferrous sulfate, 325 mg, Oral, Daily With Breakfast  guaiFENesin, 600 mg, Oral, Q12H  heparin (porcine), 5,000 Units, Subcutaneous, Q12H  labetalol, 100 mg, Oral, Q8H  levothyroxine, 75 mcg, Oral, Q AM  [START ON 2/9/2021] losartan, 50 mg, Oral, Daily  nystatin, , Topical, Q12H  pantoprazole, 40 mg, Oral, QAM  polyethylene glycol, 17 g, Oral, Daily  sodium chloride, 500 mL, Intravenous, Once      Continuous Infusions:Pharmacy Consult - Remdesivir,       PRN Meds:.•  albuterol sulfate HFA  •  benzonatate  •  diazePAM  •   "labetalol  •  nitroglycerin  •  Pharmacy Consult - Remdesivir  •  sodium chloride    Assessment/Plan   Assessment & Plan     Active Hospital Problems    Diagnosis  POA   • Pneumonia due to COVID-19 virus [U07.1, J12.82]  Yes   • Microcytic anemia [D50.9]  Yes   • Hypothyroid [E03.9]  Yes   • Hypertension [I10]  Yes   • GERD (gastroesophageal reflux disease) [K21.9]  Yes   • Asthma [J45.909]  Yes      Resolved Hospital Problems   No resolved problems to display.        Brief Hospital Course to date:  Anitra Dee is a 85 y.o. female with hypothyroidism, HTN, GERD, asthma diagnosed with COVID-19 8 days PTA initially doing well then progressively worse symptoms ultimately admitted for management of the same    The following problems are new to me today    Assessment/plan    COVID-19 pneumonia with hypoxia  -First pos test 1/28/2021; isolate through 2/17/2021  -CTA without PE  -Continue dexamethasone (x10 days is 2/13/2021)  -Continue remdesivir (x10 days as of today, 2/8/2021)  -Daily disease progression labs  -O2 support as required    Hypertension  -Initially uncontrolled, started on nifedipine with symptomatic relative hypotension; subsequently discontinued  -Continue labetalol at home dose  -Continues to refuse losartan, will decrease dose as she has been normotensive today    UTI ruled out  History of \"recurrent UTIs\"  -Contaminated sample, culture returned sterile  -Discontinue antibiotics, s/p x1 dose IV CTX  -Follows with urology outpatient, history of atrophic vaginitis, nonadherence to topical estrogen cream per outpatient documentation; Menjivar catheter was placed several days ago for concerns of mucosal interruption, discontinue Menjivar today    Contaminated blood culture  -Single blood culture with coag neg staph    Suspected CKD 3a  -Baseline Cr 0.9-1.1; EGFR high 40s-50s    Hypothyroidism  GERD  -Continue levothyroxine, PPI    Chronic mixed urge/stress incontinence  -Follows with urology outpatient, s/p " nerve stimulator 2021    DVT Prophylaxis: Heparin    Called and updated patient's daughter at 1313, discussed possible discharge tomorrow; daughter Skylar was tested (asymptomatic) today due to exposure to the patient    Disposition: I expect the patient to be discharged as early as tomorrow; family is all Covid negative and they are unsure how to care for her appropriately at home without exposing themselves.    CODE STATUS:   Code Status and Medical Interventions:   Ordered at: 21 9122     Code Status:    CPR     Medical Interventions (Level of Support Prior to Arrest):    Full       Mario Xie DO  21                Electronically signed by Mario Xie DO at 21 1801          Physical Therapy Notes (most recent note)      Shaina Mathew, PT at 21 1328  Version 1 of 1         Patient Name: Anitra Dee  : 1935    MRN: [id number]                              Today's Date: 2021       Admit Date: 2021    Visit Dx:     ICD-10-CM ICD-9-CM   1. Pneumonia due to COVID-19 virus  U07.1 480.8    J12.82 079.89   2. Hypoxia  R09.02 799.02     Patient Active Problem List   Diagnosis   • Post menopausal syndrome   • Pneumonia   • Hypothyroid   • Hypertension   • GERD (gastroesophageal reflux disease)   • Asthma   • Arthritis   • Microcytic anemia   • Pneumonia due to COVID-19 virus     Past Medical History:   Diagnosis Date   • Arthritis    • Asthma    • COVID-19    • GERD (gastroesophageal reflux disease)    • Hypertension    • Hypothyroid    • Pneumonia    • Post menopausal syndrome      Past Surgical History:   Procedure Laterality Date   • HYSTERECTOMY     • KNEE ARTHROPLASTY     • REPAIR PELVIC FLOOR DEFECT VAGINAL APPROACH W/ MESH     • SHOULDER ARTHROTOMY       General Information     Row Name 21 1328          Physical Therapy Time and Intention    Document Type  therapy note (daily note)  -NS     Mode of Treatment  individual therapy;physical therapy   -NS     Row Name 02/08/21 1328          General Information    Patient Profile Reviewed  yes  -NS     Existing Precautions/Restrictions  fall;oxygen therapy device and L/min  -NS     Row Name 02/08/21 1328          Cognition    Orientation Status (Cognition)  oriented x 4  -NS     Row Name 02/08/21 1328          Safety Issues, Functional Mobility    Safety Issues Affecting Function (Mobility)  insight into deficits/self-awareness;safety precaution awareness;safety precautions follow-through/compliance;sequencing abilities  -NS     Impairments Affecting Function (Mobility)  balance;endurance/activity tolerance;shortness of breath;strength  -NS       User Key  (r) = Recorded By, (t) = Taken By, (c) = Cosigned By    Initials Name Provider Type    Shaina Villalba, PT Physical Therapist        Mobility     Row Name 02/08/21 1328          Bed Mobility    Comment (Bed Mobility)  Not assessed; pt UIC at start and end of session.  -NS     Row Name 02/08/21 1328          Transfers    Comment (Transfers)  VCs for hand placement. Pt required extra time to transfer STS from commode.  -NS     Row Name 02/08/21 1328          Sit-Stand Transfer    Sit-Stand [place] (Transfers)  contact guard;verbal cues  -NS     Assistive Device (Sit-Stand Transfers)  walker, front-wheeled  -NS     Row Name 02/08/21 1328          Gait/Stairs (Locomotion)    [place] Level (Gait)  contact guard  -NS     Assistive Device (Gait)  walker, front-wheeled  -NS     Distance in Feet (Gait)  45  -NS     Deviations/Abnormal Patterns (Gait)  umair decreased;gait speed decreased;stride length decreased  -NS     Bilateral Gait Deviations  weight shift ability decreased;heel strike decreased  -NS     Comment (Gait/Stairs)  Patient ambulated around room and to the restroom, where she sat and rested and had moderate SOA with a significant amount of productive cough. Pt with feelings like she was going to pass out and feeling hot. Noted O2 desat to 82%  on 3L O2, requiring several minutes to recover. RN present to increase supplemental O2 to 4L.  -NS       User Key  (r) = Recorded By, (t) = Taken By, (c) = Cosigned By    Initials Name Provider Type    Shaina Villalba PT Physical Therapist        Obj/Interventions     Row Name 02/08/21 1328          Motor Skills    Motor Skills  functional endurance;therapeutic exercise  -NS     Functional Endurance  Pulmonary set: scap retraction, shoulder flexion, shoulder ABD/ADD x5 with cues for PLB. Limited by fatigue.  -NS     Therapeutic Exercise  hip;knee;ankle  -NS     Row Name 02/08/21 1328          Hip (Therapeutic Exercise)    Hip (Therapeutic Exercise)  strengthening exercise  -NS     Hip Strengthening (Therapeutic Exercise)  bilateral;external rotation;internal rotation;marching while seated;10 repetitions  -NS     [facility] Name 02/08/21 1328          Knee (Therapeutic Exercise)    Knee (Therapeutic Exercise)  strengthening exercise  -NS     Knee Strengthening (Therapeutic Exercise)  bilateral;LAQ (long arc quad);10 repetitions  -[facility] Name 02/08/21 1328          Ankle (Therapeutic Exercise)    Ankle (Therapeutic Exercise)  AROM (active range of motion)  -NS     Ankle AROM (Therapeutic Exercise)  bilateral;dorsiflexion;plantarflexion;10 repetitions  -NS     [facility] Name 02/08/21 1328          Balance    Balance Assessment  sitting static balance;sitting dynamic balance;standing static balance;standing dynamic balance  -NS     Static Sitting Balance  WFL;unsupported;sitting in chair  -NS     Dynamic Sitting Balance  WFL;unsupported;sitting in chair  -NS     Static Standing Balance  WFL;supported;standing  -NS     Dynamic Standing Balance  mild impairment;supported;standing  -NS       User Key  (r) = Recorded By, (t) = Taken By, (c) = Cosigned By    Initials Name Provider Type    Shaina Villalba PT Physical Therapist        Goals/Plan    No documentation.       Clinical Impression     Row Name 02/08/21 1328          Pain     Additional Documentation  Pain Scale: Numbers Pre/Post-Treatment (Group)  -NS     Row Name 02/08/21 1328          Pain Scale: Numbers Pre/Post-Treatment    Pretreatment Pain Rating  0/10 - no pain  -NS     Posttreatment Pain Rating  0/10 - no pain  -NS     Row Name 02/08/21 1328          Plan of Care Review    Plan of Care Reviewed With  patient  -NS     Progress  no change  -NS     Outcome Summary  Patient continues to be limited by dyspnea upon exertion. She transferred STS with CGA and ambulated 45ft with RW and CGA, demonstrating moderate SOA, coughing, and feeling her temperature rise. Noted O2 desat to 82% on 3L O2, requiring several minutes and cues for PLB to raise to 91%. Will continue per PT POC. Continue to recommend SNF and FWW at discharge.  -NS     Row Name 02/08/21 1328          Vital Signs    Pre Systolic BP Rehab  -- VSS- RN cleared for PT  -NS     Pre SpO2 (%)  93  -NS     O2 Delivery Pre Treatment  nasal cannula  -NS     Intra SpO2 (%)  82  -NS     O2 Delivery Intra Treatment  nasal cannula  -NS     Post SpO2 (%)  91  -NS     O2 Delivery Post Treatment  nasal cannula  -NS     Pre Patient Position  Sitting  -NS     Intra Patient Position  Standing  -NS     Post Patient Position  Sitting  -NS     Row Name 02/08/21 1328          Positioning and Restraints    Pre-Treatment Position  sitting in chair/recliner  -NS     Post Treatment Position  chair  -NS     In Chair  notified nsg;reclined;call light within reach;encouraged to call for assist;exit alarm on;legs elevated;waffle cushion;with nsg;heels elevated  -NS       User Key  (r) = Recorded By, (t) = Taken By, (c) = Cosigned By    Initials Name Provider Type    Shaina Villalba PT Physical Therapist        Outcome Measures     Row Name 02/08/21 1328          How much help from another person do you currently need...    Turning from your back to your side while in flat bed without using bedrails?  3  -NS     Moving from lying on back to sitting on  the side of a flat bed without bedrails?  3  -NS     Moving to and from a bed to a chair (including a wheelchair)?  3  -NS     Standing up from a chair using your arms (e.g., wheelchair, bedside chair)?  3  -NS     Climbing 3-5 steps with a railing?  2  -NS     To walk in hospital room?  3  -NS     AM-PAC 6 Clicks Score (PT)  17  -NS     Row Name 02/08/21 1328          Functional Assessment    Outcome Measure Options  AM-PAC 6 Clicks Basic Mobility (PT)  -NS       User Key  (r) = Recorded By, (t) = Taken By, (c) = Cosigned By    Initials Name Provider Type    Shaina Villalba PT Physical Therapist        Physical Therapy Education                 Title: PT OT SLP Therapies (In Progress)     Topic: Physical Therapy (In Progress)     Point: Mobility training (In Progress)     Learning Progress Summary           Patient Acceptance, E, NR by NS at 2/8/2021 1444    Acceptance, E, NR by NS at 2/6/2021 1202    Comment: Patient was educated about PT POC, benefits of using AD, PLB, and pulmonary exercises.                   Point: Home exercise program (In Progress)     Learning Progress Summary           Patient Acceptance, E, NR by NS at 2/8/2021 1444    Acceptance, E, NR by NS at 2/6/2021 1202    Comment: Patient was educated about PT POC, benefits of using AD, PLB, and pulmonary exercises.                   Point: Body mechanics (In Progress)     Learning Progress Summary           Patient Acceptance, E, NR by NS at 2/8/2021 1444    Acceptance, E, NR by NS at 2/6/2021 1202    Comment: Patient was educated about PT POC, benefits of using AD, PLB, and pulmonary exercises.                   Point: Precautions (In Progress)     Learning Progress Summary           Patient Acceptance, E, NR by NS at 2/8/2021 1444    Acceptance, E, NR by NS at 2/6/2021 1202    Comment: Patient was educated about PT POC, benefits of using AD, PLB, and pulmonary exercises.                               User Key     Initials Effective Dates Name  Provider Type Discipline    NS 09/10/19 -  Shaina Mathew PT Physical Therapist PT              PT Recommendation and Plan  Planned Therapy Interventions (PT): balance training, bed mobility training, gait training, home exercise program, patient/family education, neuromuscular re-education, strengthening, transfer training, stair training  Plan of Care Reviewed With: patient  Progress: no change  Outcome Summary: Patient continues to be limited by dyspnea upon exertion. She transferred STS with CGA and ambulated 45ft with RW and CGA, demonstrating moderate SOA, coughing, and feeling her temperature rise. Noted O2 desat to 82% on 3L O2, requiring several minutes and cues for PLB to raise to 91%. Will continue per PT POC. Continue to recommend SNF and FWW at discharge.     Time Calculation:   PT Charges     Row Name 02/08/21 1328             Time Calculation    Start Time  1328  -NS      PT Received On  02/08/21  -NS      PT Goal Re-Cert Due Date  02/16/21  -NS         Timed Charges    08118 - PT Therapeutic Exercise Minutes  10  -NS      96310 - Gait Training Minutes   13  -NS      47469 - PT Therapeutic Activity Minutes  15  -NS        User Key  (r) = Recorded By, (t) = Taken By, (c) = Cosigned By    Initials Name Provider Type    NS Shaina Mathew PT Physical Therapist        Therapy Charges for Today     Code Description Service Date Service Provider Modifiers Qty    70505385752 HC PT THER PROC EA 15 MIN 2/8/2021 Shaina Mathew, PT GP 1    86222518563 HC GAIT TRAINING EA 15 MIN 2/8/2021 Shaina Mathew, PT GP 1    83232225938 HC PT THERAPEUTIC ACT EA 15 MIN 2/8/2021 Shaina Mathew, PT GP 1          PT G-Codes  Outcome Measure Options: AM-PAC 6 Clicks Basic Mobility (PT)  AM-PAC 6 Clicks Score (PT): 17  AM-PAC 6 Clicks Score (OT): 15    Shaina Mathew PT  2/8/2021      Electronically signed by Shaina Mathew PT at 02/08/21 7347          Occupational Therapy Notes (most recent note)      Stella Vargas, OT at 02/08/21  0803          Patient Name: Anitra Dee  : 1935    MRN: [id number]                              Today's Date: 2021       Admit Date: 2021    Visit Dx:     ICD-10-CM ICD-9-CM   1. Pneumonia due to COVID-19 virus  U07.1 480.8    J12.82 079.89   2. Hypoxia  R09.02 799.02     Patient Active Problem List   Diagnosis   • Post menopausal syndrome   • Pneumonia   • Hypothyroid   • Hypertension   • GERD (gastroesophageal reflux disease)   • Asthma   • Arthritis   • Microcytic anemia   • Pneumonia due to COVID-19 virus     Past Medical History:   Diagnosis Date   • Arthritis    • Asthma    • COVID-19    • GERD (gastroesophageal reflux disease)    • Hypertension    • Hypothyroid    • Pneumonia    • Post menopausal syndrome      Past Surgical History:   Procedure Laterality Date   • HYSTERECTOMY     • KNEE ARTHROPLASTY     • REPAIR PELVIC FLOOR DEFECT VAGINAL APPROACH W/ MESH     • SHOULDER ARTHROTOMY       General Information     Row Name 21 1000          OT Time and Intention    Document Type  evaluation  -GABRIELLE     Mode of Treatment  individual therapy;occupational therapy  -     Row Name 21 1000          General Information    Patient Profile Reviewed  yes  -GABRIELLE     Prior Level of Function  independent:;all household mobility;community mobility;ADL's;home management;driving;shopping  -GABRIELLE     Existing Precautions/Restrictions  fall;oxygen therapy device and L/min  -GABRIELLE     Barriers to Rehab  none identified  -GABRIELLE     Row Name 21 1000          Living Environment    Lives With  alone per pt. dtr. can come and stay with her for a while and assist her.  -GABRIELLE     Row Name 21 1000          Home Main Entrance    Number of Stairs, Main Entrance  two  -GABRIELLE     Stair Railings, Main Entrance  railing on right side (ascending)  -GABRIELLE     Row Name 21 1000          Stairs Within Home, Primary    Number of Stairs, Within Home, Primary  none  -GABRIELLE     Stairs Comment, Within Home, Primary  pt. with  tub shower with grab bar  -     Row Name 02/08/21 1000          Cognition    Orientation Status (Cognition)  oriented x 4  -     Row Name 02/08/21 1000          Safety Issues, Functional Mobility    Impairments Affecting Function (Mobility)  balance;endurance/activity tolerance;shortness of breath;strength  -       User Key  (r) = Recorded By, (t) = Taken By, (c) = Cosigned By    Initials Name Provider Type     Stella Vargas, OT Occupational Therapist          Mobility/ADL's     Row Name 02/08/21 1001          Bed Mobility    Bed Mobility  supine-sit  -     Supine-Sit [place] (Bed Mobility)  standby assist;verbal cues  -     Bed Mobility, Safety Issues  decreased use of legs for bridging/pushing;impaired trunk control for bed mobility  -     Assistive Device (Bed Mobility)  bed rails;head of bed elevated  -     Comment (Bed Mobility)  pt. needed extra time and effort, but performed on own.  Per pt. lately sleeping at home in recliner.  02 to 87% and unable to keep up so 02 elevated to 5L.  -     Row Name 02/08/21 1001          Transfers    Transfers  sit-stand transfer  -     Sit-Stand [place] (Transfers)  contact guard  -[facility] Name 02/08/21 1001          Sit-Stand Transfer    Assistive Device (Sit-Stand Transfers)  walker, front-wheeled  -[facility] Name 02/08/21 1001          Functional Mobility    Functional Mobility- Ind. Level  contact guard assist  -     Functional Mobility- Device  rolling walker  -     Functional Mobility-Distance (Feet)  12  -     Functional Mobility- Safety Issues  supplemental O2  -     Row Name 02/08/21 1001          Activities of Daily Living    BADL Assessment/Intervention  lower body dressing;feeding;grooming  -[facility] Name 02/08/21 1001          Lower Body Dressing Assessment/Training    [place] Level (Lower Body Dressing)  don;socks;dependent (less than 25% patient effort)  -     Position (Lower Body Dressing)  edge of bed  sitting  -     Comment (Lower Body Dressing)  pt. unable to perform at EOB today due to weakness and SOA  -[facility] Name 02/08/21 1001          Self-Feeding Assessment/Training    [place] Level (Feeding)  liquids to mouth;independent  -GABRIELLE     Position (Self-Feeding)  unsupported sitting;supported sitting  -     Row Name 02/08/21 1001          Grooming Assessment/Training    [place] Level (Grooming)  wash face, hands;set up  -GABRIELLE     Position (Grooming)  long sitting  -       User Key  (r) = Recorded By, (t) = Taken By, (c) = Cosigned By    Initials Name Provider Type    Stella North, OT Occupational Therapist        Obj/Interventions     [facility] Name 02/08/21 1004          Sensory Assessment (Somatosensory)    Sensory Assessment (Somatosensory)  UE sensation intact  -[facility] Name 02/08/21 1004          Vision Assessment/Intervention    Visual Impairment/Limitations  WFL  -[facility] Name 02/08/21 1004          Range of Motion Comprehensive    General Range of Motion  bilateral upper extremity ROM WNL  -[facility] Name 02/08/21 1004          Strength Comprehensive (MMT)    General Manual Muscle Testing (MMT) Assessment  no strength deficits identified  -     Comment, General Manual Muscle Testing (MMT) Assessment  BUE strength grossly 4+ to 5/5  -[facility] Name 02/08/21 1004          Shoulder (Therapeutic Exercise)    Shoulder (Therapeutic Exercise)  AROM (active range of motion)  -     Shoulder AROM (Therapeutic Exercise)  bilateral;flexion;extension;horizontal aBduction/aDduction;scapular retraction;5 second hold;10 repetitions 5-10 reps of each, rest period in between with AB  -[facility] Name 02/08/21 1004          Balance    Static Sitting Balance  WFL;unsupported;sitting, edge of bed  -GABRIELLE     Static Standing Balance  WFL;supported;standing  -GABRIELLE     Dynamic Standing Balance  mild impairment;supported;standing  -[facility] Name 02/08/21 1004          Therapeutic Exercise    Therapeutic  Exercise  shoulder  -GABRIELLE       User Key  (r) = Recorded By, (t) = Taken By, (c) = Cosigned By    Initials Name Provider Type    Stella North, OT Occupational Therapist        Goals/Plan     Row Name 02/08/21 1011          Transfer Goal 1 (OT)    Activity/Assistive Device (Transfer Goal 1, OT)  toilet;walker, rolling;commode;sit-to-stand/stand-to-sit  -GABRIELLE     [place] Level/Cues Needed (Transfer Goal 1, OT)  standby assist  -GABRIELLE     Time Frame (Transfer Goal 1, OT)  long term goal (LTG);10 days  -GABRIELLE     Progress/Outcome (Transfer Goal 1, OT)  goal ongoing  -GABRIELLE     Row Name 02/08/21 1011          Dressing Goal 1 (OT)    Activity/Device (Dressing Goal 1, OT)  lower body dressing AE prn, socks and undergarment  -GABRIELLE     [place]/Cues Needed (Dressing Goal 1, OT)  minimum assist (75% or more patient effort)  -GABRIELLE     Time Frame (Dressing Goal 1, OT)  long term goal (LTG);10 days  -GABRIELLE     Strategies/Barriers (Dressing Goal 1, OT)  02 sats 90% or greater  -GABRIELLE     Progress/Outcome (Dressing Goal 1, OT)  goal ongoing  -GABRIELLE     Row Name 02/08/21 1011          Toileting Goal 1 (OT)    Activity/Device (Toileting Goal 1, OT)  toileting skills, all;commode  -GABRIELLE     [place] Level/Cues Needed (Toileting Goal 1, OT)  standby assist  -GABRIELLE     Time Frame (Toileting Goal 1, OT)  long term goal (LTG);10 days  -GABRIELLE     Progress/Outcome (Toileting Goal 1, OT)  goal ongoing  -GABRIELLE     Row Name 02/08/21 1011          Grooming Goal 1 (OT)    Activity/Device (Grooming Goal 1, OT)  hair care;oral care;wash face, hands sinkside  -GABRIELLE     [place] (Grooming Goal 1, OT)  standby assist  -GABRIELLE     Time Frame (Grooming Goal 1, OT)  long term goal (LTG);10 days  -GABRIELLE     Strategies/Barriers (Grooming Goal 1, OT)  02 sats 90% or greater  -GABRIELLE     Progress/Outcome (Grooming Goal 1, OT)  goal ongoing  -GABRIELLE     Row Name 02/08/21 1011          ROM Goal 1 (OT)    ROM Goal 1 (OT)  Pt. independent with 10 reps each pulmonary TE.  -GABRIELLE      Strategies/Barriers (ROM Goal 1, OT)  02 sats 90 % or greater.  -GABRIELLE     Progress/Outcome (ROM Goal 1, OT)  goal ongoing  -GABRIELLE     Row Name 02/08/21 1011          Therapy Assessment/Plan (OT)    Planned Therapy Interventions (OT)  activity tolerance training;BADL retraining;functional balance retraining;occupation/activity based interventions;patient/caregiver education/training;ROM/therapeutic exercise;transfer/mobility retraining  -GABRIELLE       User Key  (r) = Recorded By, (t) = Taken By, (c) = Cosigned By    Initials Name Provider Type    Stella North, OT Occupational Therapist        Clinical Impression     Row Name 02/08/21 1006          Pain Scale: Numbers Pre/Post-Treatment    Pretreatment Pain Rating  0/10 - no pain  -GABRIELLE     Posttreatment Pain Rating  0/10 - no pain  -GABRIELLE     Row Name 02/08/21 1006          Plan of Care Review    Plan of Care Reviewed With  patient  -GABRIELLE     Progress  no change  -GABRIELLE     Outcome Summary  OT evaluation completed.  Pt. demonstrating decreased strength core, impaired activity tolerance and mild decrease in balance impacting ADL independence warrantly continued skilled OT servcies.  Pt. SBA with extra time to EOB, 02 sate to 87%.  Pt. unable to elevate with AB well and maintain. 02 to 5L for activity.  Pt. stood CGA and mobilize around bed once 02 back to 92% on 5L.  Pt. 02 sats dropped to 83% after 12 ft to chair.  Several minutes to recover with AB so 02 could be lowered back to 2.5 L.  Pt. able to self feed post setup. If pt. able to tolerate lower 02 with good 02 sats with activity will likley be able to discharge home with HH OT/PT and dtr. assist, but if continues to struggle with 02 sat drop with activity may need SNF at discharge.  Shower chair recommended for home.  -GABRIELLE     Row Name 02/08/21 1006          Therapy Assessment/Plan (OT)    Patient/Family Therapy Goal Statement (OT)  return home to PLOF  -GABRIELLE     Rehab Potential (OT)  good, to achieve stated therapy goals  -GABRIELLE      Criteria for Skilled Therapeutic Interventions Met (OT)  yes;meets criteria;skilled treatment is necessary  -GABRIELLE     Therapy Frequency (OT)  daily  -GABRIELLE     Row Name 02/08/21 1006          Therapy Plan Review/Discharge Plan (OT)    Equipment Needs Upon Discharge (OT)  shower chair  -GABRIELLE     Anticipated Discharge Disposition (OT)  skilled nursing facility;home with assist;home with home health  -GABRIELLE     Row Name 02/08/21 1006          Vital Signs    Pre Systolic BP Rehab  118  -GABRIELLE     Pre Treatment Diastolic BP  57  -GABRIELLE     Post Systolic BP Rehab  113  -GABRIELLE     Post Treatment Diastolic BP  56  -GABRIELLE     Pretreatment Heart Rate (beats/min)  59  -GABRIELLE     Posttreatment Heart Rate (beats/min)  55  -GABRIELLE     Pre SpO2 (%)  95  -GABRIELLE     O2 Delivery Pre Treatment  nasal cannula  -GABRIELLE     Intra SpO2 (%)  83  -GABRIELLE     O2 Delivery Intra Treatment  nasal cannula  -GABRIELLE     Post SpO2 (%)  90  -GABRIELLE     O2 Delivery Post Treatment  nasal cannula  -GABRIELLE     Pre Patient Position  Supine  -GABRIELLE     Intra Patient Position  Standing  -GABRIELLE     Post Patient Position  Sitting  -GABRIELLE     Row Name 02/08/21 1006          Positioning and Restraints    Pre-Treatment Position  in bed  -GABRIELLE     Post Treatment Position  chair  -GABRIELLE     In Chair  reclined;call light within reach;encouraged to call for assist;exit alarm on;waffle cushion  -       User Key  (r) = Recorded By, (t) = Taken By, (c) = Cosigned By    Initials Name Provider Type    Stella North, OT Occupational Therapist        Outcome Measures     Row Name 02/08/21 1015          How much help from another is currently needed...    Putting on and taking off regular lower body clothing?  2  -GABRIELLE     Bathing (including washing, rinsing, and drying)  2  -GABRIELLE     Toileting (which includes using toilet bed pan or urinal)  2  -GABRIELLE     Putting on and taking off regular upper body clothing  2  -GABRIELLE     Taking care of personal grooming (such as brushing teeth)  3  -GABRIELLE     Eating meals  4  -GABRIELLE     AM-PAC 6 Clicks  Score (OT)  15  -GABRIELLE     Row Name 02/08/21 1015          Functional Assessment    Outcome Measure Options  AM-PAC 6 Clicks Daily Activity (OT)  -GABRIELLE       User Key  (r) = Recorded By, (t) = Taken By, (c) = Cosigned By    Initials Name Provider Type    Stella North OT Occupational Therapist        Occupational Therapy Education                 Title: PT OT SLP Therapies (In Progress)     Topic: Occupational Therapy (In Progress)     Point: ADL training (Done)     Description:   Instruct learner(s) on proper safety adaptation and remediation techniques during self care or transfers.   Instruct in proper use of assistive devices.              Learning Progress Summary           Patient Acceptance, E,D, VU,NR by GABRIELLE at 2/8/2021 1016    Comment: reason for consult, noted deficits, AB, pulmonary TE, recommendation of shower chair, why and where to purchase.                   Point: Home exercise program (Done)     Description:   Instruct learner(s) on appropriate technique for monitoring, assisting and/or progressing therapeutic exercises/activities.              Learning Progress Summary           Patient Acceptance, E,D, VU,NR by GABRIELLE at 2/8/2021 1016    Comment: reason for consult, noted deficits, AB, pulmonary TE, recommendation of shower chair, why and where to purchase.                   Point: Precautions (Not Started)     Description:   Instruct learner(s) on prescribed precautions during self-care and functional transfers.              Learner Progress:  Not documented in this visit.          Point: Body mechanics (Done)     Description:   Instruct learner(s) on proper positioning and spine alignment during self-care, functional mobility activities and/or exercises.              Learning Progress Summary           Patient Acceptance, E,D, VU,NR by GABRIELLE at 2/8/2021 1016    Comment: reason for consult, noted deficits, AB, pulmonary TE, recommendation of shower chair, why and where to purchase.                                User Key     Initials Effective Dates Name Provider Type Discipline     06/08/18 -  Stella Vargas OT Occupational Therapist OT              OT Recommendation and Plan  Planned Therapy Interventions (OT): activity tolerance training, BADL retraining, functional balance retraining, occupation/activity based interventions, patient/caregiver education/training, ROM/therapeutic exercise, transfer/mobility retraining  Therapy Frequency (OT): daily  Plan of Care Review  Plan of Care Reviewed With: patient  Progress: no change  Outcome Summary: OT evaluation completed.  Pt. demonstrating decreased strength core, impaired activity tolerance and mild decrease in balance impacting ADL independence warrantly continued skilled OT servcies.  Pt. SBA with extra time to EOB, 02 sate to 87%.  Pt. unable to elevate with AB well and maintain. 02 to 5L for activity.  Pt. stood CGA and mobilize around bed once 02 back to 92% on 5L.  Pt. 02 sats dropped to 83% after 12 ft to chair.  Several minutes to recover with AB so 02 could be lowered back to 2.5 L.  Pt. able to self feed post setup. If pt. able to tolerate lower 02 with good 02 sats with activity will likley be able to discharge home with HH OT/PT and dtr. assist, but if continues to struggle with 02 sat drop with activity may need SNF at discharge.  Shower chair recommended for home.     Time Calculation:   Time Calculation- OT     Row Name 02/08/21 1016             Time Calculation- OT    OT Start Time  0803  -GABRIELLE      OT Received On  02/08/21  -GABRIELLE      OT Goal Re-Cert Due Date  02/18/21  -GABRIELLE         Timed Charges    22188 - OT Therapeutic Exercise Minutes  8  -GABRIELLE      29329 - OT Therapeutic Activity Minutes  5  -GABRIELLE      38584 - OT Self Care/Mgmt Minutes  3  -GABRIELLE        User Key  (r) = Recorded By, (t) = Taken By, (c) = Cosigned By    Initials Name Provider Type    GABRIELLE Stella Vargas OT Occupational Therapist        Therapy Charges for Today     Code Description  Service Date Service Provider Modifiers Qty    26620024299 HC OT THER PROC EA 15 MIN 2/8/2021 Stella Vargas, OT GO 1    16624880313 HC OT EVAL LOW COMPLEXITY 3 2/8/2021 Stella Vargas, FORREST GO 1               Stella Vargas OT  2/8/2021    Electronically signed by Stella Vargas, OT at 02/08/21 1018

## 2021-02-09 NOTE — PLAN OF CARE
Goal Outcome Evaluation:  Plan of Care Reviewed With: patient  Progress: improving  Outcome Summary: VSS. Patient had no complaints of distress. cont to monitor

## 2021-02-09 NOTE — SIGNIFICANT NOTE
Patient discharging with home health, oxygen tank delivered at bedside, discharge teaching done, tele box returned.

## 2021-02-09 NOTE — PROGRESS NOTES
Continued Stay Note  [facility]     Patient Name: Anitra Dee  MRN: [id number]  Today's Date: 2/9/2021    Admit Date: 2/4/2021    Discharge Plan     Row Name 02/09/21 1434       Plan    Plan  update    Patient/Family in Agreement with Plan  yes    Plan Comments  Received a call from Arabella with [facility]  who reports that they have accepted referral and can see patient.    Final Discharge Disposition Code  06 - home with home health care    Row Name 02/09/21 1433       Plan    Plan  update    Patient/Family in Agreement with Plan  yes    Final Discharge Disposition Code  06 - home with home health care    Row Name 02/09/21 1344       Plan    Plan  Home with [facility]     Patient/Family in Agreement with Plan  yes    Plan Comments  Received a call from patient daughter, Skylar, who has spoken with the MD and has decided to have her mother discharge to her sister, Benji, house today with [facility].  A Pulse Oximeter will be provided to patient today to take home with her.  Request [facility] for oxygen and [facility].  No other discharge needs verbalized.  Called [facility] [phone] who request orders be faxed to [phone] and can deliver Oxygen to patient room today.  Called [facility] [phone] and spoke to Marco who request orders and clinical documentation be faxed to [phone].  Patient plan is to discharge home to her daughter, Benji, house today with [facility] via car with family to transport.    Final Discharge Disposition Code  06 - home with home health care    Final Note  Received a call from patient daughter, Skylar, who has spoken with the MD and has decided to have her mother discharge to her sister, Benji, house today with [facility].  A Pulse Oximeter will be provided to patient today to take home with her.  Request [facility] for oxygen and [facility].  No other discharge needs verbalized.  Called [facility] [phone] who request orders be faxed to [phone] and can  deliver Oxygen to patient room today.  [phone] and spoke to Marco who request orders and clinical documentation be faxed to [phone].  Patient plan is to discharge home to her daughter, Benji, house today with [facility] via car with family to transport.        Discharge Codes    No documentation.       Expected Discharge Date and Time     Expected Discharge Date Expected Discharge Time    Feb 9, 2021             Basilia An RN

## 2021-02-09 NOTE — DISCHARGE PLACEMENT REQUEST
"Anitra Robertson (85 y.o. Female)     Date of Birth Social Security Number Address Home Phone MRN    1935  [address] [place] [address] [phone] [id number]    [employer]oda[employer] Marital Status          Hindu        Admission Date Admission Type Admitting Provider Attending Provider Department, Room/Bed    2/4/21 Emergency Mario Xie DO Shields, Daniel Alan, DO [facility] 6B, N639/1    Discharge Date Discharge Disposition Discharge Destination         Home-Health Care [facility]              Attending Provider: Mario Xie DO    Allergies: Latex    Isolation: Contact Air, Enh Drop/Con   Infection: COVID (confirmed) (02/04/21)   Code Status: CPR    Ht: 149.9 cm (59\")   Wt: 74.8 kg (165 lb)    Admission Cmt: None   Principal Problem: Pneumonia due to COVID-19 virus [U07.1,J12.82]                 Active Insurance as of 2/4/2021     Primary Coverage     Payor Plan Insurance Group Employer/Plan Group    MEDICARE MEDICARE A & B      Payor Plan Address Payor Plan Phone Number Payor Plan Fax Number Effective Dates    [address] [phone]  11/1/2002 - None Entered    [facility] [address]       Subscriber Name Subscriber Birth Date Member ID       ANITRA ROBERTSON 1935 [id number]           Secondary Coverage     Payor Plan Insurance Group Employer/Plan Group    [employer] SUP AAR HEALTH CARE OPTIONS      Payor Plan Address Payor Plan Phone Number Payor Plan Fax Number Effective Dates    [facility] [phone]  1/1/2017 - None Entered    [address]       [facility] [address]       Subscriber Name Subscriber Birth Date Member ID       ANITRA ROBERTSON 1935 [id number]                 Emergency Contacts      (Rel.) Home Phone Work Phone Mobile Phone    KingsleySkylar (Daughter) [phone] -- --    DenilsonCharito (Daughter) [phone] -- --    HarveyShawn (Son) [phone] -- --            Insurance Information                " MEDICARE/MEDICARE A & B Phone: [phone]    Subscriber: Anitra Dee Subscriber#: [id number]    Group#:  Precert#:         [facility] HEALTH CARE OPTIONS Phone: [phone]    Subscriber: Anitra Dee Subscriber#: [id number]    Group#:  Precert#:              History & Physical      Eula Stevens MD at 21 1733              [facility] Medicine Services  HISTORY AND PHYSICAL    Patient Name: Anitra Dee  : 1935  MRN: [id number]  Primary Care Physician: Eleuterio Myers MD  Date of admission: 2021    Subjective   Subjective     Chief Complaint:  SOB     HPI:  Anitra Dee is a 85 y.o. female with pmh of hypothyroidism, htn, GERD, asthma, arthritis, and PNA. Patient presented to [facility] ED with complaints of progressive SOB. She was diagnosed with COVID 8 days ago. She had been doing ok until 2 days ago when she started having increasing sob. She has a 02 sat monitor at home and her oxygen level has been  Between 86-91%. She has not had any outpatient treatment for COVID. She has lost taste and smell. She had one episode of diarrhea.     In ED: room air 84%, bnp 2336, creat 1.04, lactate 2.3-0.9, procal 0.08, hgb 9.0,       Current COVID Risks are:  [] Fever [x]  Cough [x] Shortness of breath [] Fatigue [] Change in taste or smell    [] Exposure to COVID positive patient  [] High risk facility   []  NONE    Review of Systems   Constitutional: Negative for chills and fever.   Respiratory: Positive for shortness of breath.    Cardiovascular: Negative for chest pain.   Genitourinary: Negative for dysuria.        All other systems reviewed and are negative.     Personal History     Past Medical History:   Diagnosis Date   • Arthritis    • Asthma    • COVID-19    • GERD (gastroesophageal reflux disease)    • Hypertension    • Hypothyroid    • Pneumonia    • Post menopausal syndrome        Past Surgical History:   Procedure Laterality Date   •  HYSTERECTOMY     • KNEE ARTHROPLASTY     • REPAIR PELVIC FLOOR DEFECT VAGINAL APPROACH W/ MESH     • SHOULDER ARTHROTOMY         Family History: family history includes Cancer in her mother; Lung disease in her mother; Other in her father. Otherwise pertinent FHx was reviewed and unremarkable.     Social History:  reports that she has quit smoking. She has never used smokeless tobacco. She reports that she does not drink alcohol or use drugs.  She is     Medications:  aspirin, labetalol, levothyroxine, losartan, methenamine, omeprazole, and umeclidinium-vilanterol    Allergies   Allergen Reactions   • Latex Hives       Objective   Objective     Vital Signs:   Temp:  [97.9 °F (36.6 °C)] 97.9 °F (36.6 °C)  Heart Rate:  [61-75] 65  Resp:  [20-22] 22  BP: (147-197)/(56-78) 186/67  Flow (L/min):  [2.5] 2.5    Physical Exam   Patient is alert and talkative in no distress at rest, non toxic  Neck is without mass or JVD  Heart is Reg wo murmur  Lungs are unlabored until she coughs  Abd is obese, not distended  MAEW  Skin is without rash  Neurologic exam in nonfocal   Mood is appropriate      Results Reviewed:  I have personally reviewed most recent indicated data and agree with findings including:  [x]  Laboratory  [x]  Radiology  [x]  EKG/Telemetry  []  Pathology  []  Cardiac/Vascular Studies  [x]  Old records  []  Other:  Most pertinent findings include:new anemia, elevated inflammatory markers    LAB RESULTS:      Lab 02/04/21  1629 02/04/21  1216   WBC  --  4.17   HEMOGLOBIN  --  9.0*   HEMATOCRIT  --  30.5*   PLATELETS  --  224   NEUTROS ABS  --  3.37   IMMATURE GRANS (ABS)  --  0.02   LYMPHS ABS  --  0.56*   MONOS ABS  --  0.19   EOS ABS  --  0.02   MCV  --  76.1*   PROCALCITONIN  --  0.08   LACTATE 0.9 2.3*   LDH  --  305*         Lab 02/04/21  1216   SODIUM 139   POTASSIUM 4.3   CHLORIDE 103   CO2 28.0   ANION GAP 8.0   BUN 16   CREATININE 1.04*   GLUCOSE 133*   CALCIUM 8.8         Lab 02/04/21  1216   TOTAL  PROTEIN 6.6   ALBUMIN 3.10*   GLOBULIN 3.5   ALT (SGPT) 8   AST (SGOT) 25   BILIRUBIN 0.3   ALK PHOS 76         Lab 02/04/21  1216   PROBNP 2,336.0*   TROPONIN T <0.010                 Brief Urine Lab Results  (Last result in the past 365 days)      Color   Clarity   Blood   Leuk Est   Nitrite   Protein   CREAT   Urine HCG        02/04/21 1351 Yellow Turbid Negative Moderate (2+) Negative Trace             Microbiology Results (last 10 days)     Procedure Component Value - Date/Time    COVID PRE-OP / PRE-PROCEDURE SCREENING ORDER (NO ISOLATION) - Swab, Nasopharynx [[id number]]  (Abnormal) Collected: 02/04/21 1456    Lab Status: Final result Specimen: Swab from Nasopharynx Updated: 02/04/21 1633    Narrative:      The following orders were created for panel order COVID PRE-OP / PRE-PROCEDURE SCREENING ORDER (NO ISOLATION) - Swab, Nasopharynx.  Procedure                               Abnormality         Status                     ---------                               -----------         ------                     Respiratory Panel PCR w/...[[id number]]  Abnormal            Final result                 Please view results for these tests on the individual orders.    Respiratory Panel PCR w/COVID-19(SARS-CoV-2) FARRAH/ROZ/SUZY/PAD/COR/MAD/TAD In-House, NP Swab in UTM/VTM, 3-4 HR TAT - Swab, Nasopharynx [[id number]]  (Abnormal) Collected: 02/04/21 1456    Lab Status: Final result Specimen: Swab from Nasopharynx Updated: 02/04/21 1633     ADENOVIRUS, PCR Not Detected     Coronavirus 229E Not Detected     Coronavirus HKU1 Not Detected     Coronavirus NL63 Not Detected     Coronavirus OC43 Not Detected     COVID19 Detected     Human Metapneumovirus Not Detected     Human Rhinovirus/Enterovirus Not Detected     Influenza A PCR Not Detected     Influenza A H1 Not Detected     Influenza A H1 2009 PCR Not Detected     Influenza A H3 Not Detected     Influenza B PCR Not Detected     Parainfluenza Virus 1 Not Detected      Parainfluenza Virus 2 Not Detected     Parainfluenza Virus 3 Not Detected     Parainfluenza Virus 4 Not Detected     RSV, PCR Not Detected     Bordetella pertussis pcr Not Detected     Bordetella parapertussis PCR Not Detected     Chlamydophila pneumoniae PCR Not Detected     Mycoplasma pneumo by PCR Not Detected    Narrative:      Fact sheet for providers: https://docs.[employer]/wp-content/uploads/AEU7763-3162-TM3.1-EUA-Provider-Fact-Sheet-3.pdf    Fact sheet for patients: https://docs.[employer]/wp-content/uploads/HZR6339-2461-KK6.1-EUA-Patient-Fact-Sheet-1.pdf    Test performed by PCR.          Xr Chest 1 View    Result Date: 2/4/2021  EXAMINATION: XR CHEST 1 VW-  INDICATION: Shortness of air triage protocol.  COMPARISON: None.  FINDINGS: Single AP view of the chest reveals cardiac size borderline enlarged. Bilateral patchy opacifications within the mid and lower lungs consistent with airspace disease such as bronchopneumonia. No pneumothorax or pleural effusion. Degenerative changes of the spine.         Impression: Opacifications throughout the mid and lower lungs of a peripheral predominance consistent with acute airspace disease such as bronchopneumonia. No pleural effusion.  D:  02/04/2021 E:  02/04/2021  This report was finalized on 2/4/2021 4:55 PM by Dr. Anton Glass.        Results for orders placed during the hospital encounter of 08/09/19   Transthoracic Echo Complete With Contrast if Necessary Per Protocol    Narrative · Estimated EF = 65%.  · Mild mitral valve regurgitation is present  · Moderate tricuspid valve regurgitation is present.  · Calculated right ventricular systolic pressure from tricuspid   regurgitation is 51 mmHg.          Assessment/Plan   Assessment & Plan       Pneumonia due to COVID-19 virus    Hypertension    Hypothyroid    GERD (gastroesophageal reflux disease)    Asthma    Microcytic anemia      COVID-19 PNA  -- cont  Dexamethasone for 10 days  -- start  remdesivir and  "pharmacy to dose  -- oxygen prn to keep sat > 90%  -- prn albuterol and cough suppressants     Worsening anemia  -iron deficient, check occult stool  - further workup once respiratory status more stable  -watch closely heparin    Hypothyroidism  -- cont home meds     GERD  -- cont ppi     HTN  -- cont labetalol and cozaar   -- have tried IV labetalol tongith without much success, adding nitropaste and cardene if needed.      DVT prophylaxis:  lovenox       CODE STATUS:  Full code   Code Status and Medical Interventions:   Ordered at: 21 1745     Code Status:    CPR     Medical Interventions (Level of Support Prior to Arrest):    Full         This note has been completed as part of a split-shared workflow.   Note initiated and   Electronically signed by NAA Lund, 21, 5:33 PM EST.  Patient seen and examined by Electronically signed by Eula Stevens MD, 21, 10:36 PM EST.        Electronically signed by Eula Stevens MD at 21 0135       [address] [address]  Dept. Phone:  [phone]  Dept. Fax:  [phone] Date Ordered: 2021         Patient:  Anitra Dee MRN:  [id number]   [address] [facility] [address] :  1935  SSN:    Phone: [phone] Sex:  F     Weight: 74.8 kg (165 lb)         Ht Readings from Last 1 Encounters:   21 149.9 cm (59\")         Oxygen Therapy         (Order ID: [id number])    Diagnosis:  Pneumonia due to COVID-19 virus (U07.1,J12.82 [ICD-10-CM] 480.8,079.89 [ICD-9-CM])  Hypoxia (R09.02 [ICD-10-CM] 799.02 [ICD-9-CM])   Quantity:  1     Delivery Modality: Nasal Cannula  Liters Per Minute: 3  Duration: Continuous  Equipment:  Oxygen Concentrator &  &  Portable Gaseous Oxygen System & Portable Oxygen Contents Gaseous &  Conserving Regulator  Length of Need (99 Months = Lifetime): 99 Months = Lifetime        Authorizing Provider's " Phone: [phone]   Verbal Order Mode: Telephone with readback   Authorizing Provider: Mario Xie DO  Authorizing Provider's NPI: [id number]     Order Entered By: Basilia An RN 2/9/2021  1:31 PM     Electronically signed by: Mario Xie DO 2/9/2021  1:37 PM        Anitra Dee #[id number] (CSN:[id number]) (85 y.o. F) (Adm: 02/04/21)  [facility] [id number]  Patient Admission Status Report    Has Admission Order? PTA Med Review Complete? Home Meds Reconciled? Current Orders Reconciled?   Yes Yes No No   Admission Orders    Start   Ordered   02/04/21 1652  Inpatient Admission Once      02/04/21 1652      Vital Signs  Report  View Graph     02/06 0700   02/07 0659 02/07 0700   02/08 0659 02/08 0700   02/09 0659 02/09 0700   02/09 1340  Most Recent    Temp (°F)     96.5-97.7 96.6-97.1 96.7-97.1 97.9  97.9 (36.6)    Heart Rate     60-72 58-69 60-74 69-74  69    Resp     18 20-22 20 20  20    BP     115//76 105//56 132//72 150//63  157/62     Sticky Notes from Clinical Staff  Comment     Last edited by  on  at       Vital Signs (last 2 days)    Date/Time  Temp  Pulse  Resp  BP  Device (Oxygen Therapy)  Flow (L/min)  SpO2   02/09/21 1329  --  69  --  --  room air  --  88Abnormal    02/09/21 1325  --  73  --  157/62  --  --  --   02/09/21 1100  97.9 (36.6)  --  20  164/63  --  --  --   02/09/21 0951  --  74  --  --  nasal cannula  3  93   02/09/21 0949  --  72  --  150/64  --  --  --   02/09/21 0427  97 (36.1)  60  20  156/63  --  --  --   02/09/21 0400  --  --  --  --  nasal cannula  4  --   02/08/21 2345  96.9 (36.1)  --  20  156/69  --  --  97   02/08/21 2200  --  --  --  --  nasal cannula  4  --   02/08/21 2000  --  --  --  --  nasal cannula  4  --   02/08/21 1935  96.8 (36)  68  20  165/72  --  --  95   02/08/21 1800  --  --  --  --  nasal cannula  4  --   02/08/21 1608  --  --  --  --  nasal cannula  4  --   02/08/21 1528  96.7 (35.9)  --  20  --  --  --  --    02/08/21 1403  --  --  --  --  nasal cannula  4  --   02/08/21 1402  --  65  --  --  nasal cannula  4  88Abnormal    02/08/21 1345  --  74  --  --  nasal cannula  3  --   02/08/21 1244  --  --  --  --  nasal cannula  2  --   02/08/21 1114  96.7 (35.9)  --  20  132/57  nasal cannula  2  --   02/08/21 1012  --  --  --  --  nasal cannula  2  --   02/08/21 0901  --  64  --  132/55  --  --  --   02/08/21 0852  --  --  --  --  nasal cannula  2  --   02/08/21 0734  97.1 (36.2)  --  20  --  --  --  --   02/08/21 0600  --  --  --  --  nasal cannula  2.5  --   02/08/21 0500  97.1 (36.2)  58  22  136/56  --  --  95   02/08/21 0400  --  --  --  --  nasal cannula  2.5  --   02/08/21 0200  --  --  --  --  nasal cannula  2.5  --   02/07/21 2350  96.7 (35.9)  69  22  133/61  --  --  96   02/07/21 2224  --  --  --  --  nasal cannula  2.5  --   02/07/21 2035  97 (36.1)  66  22  120/64  --  --  94   02/07/21 2000  --  --  --  --  nasal cannula;humidified  2.5  --   02/07/21 1800  --  --  --  --  humidified;nasal cannula  2.5  --   02/07/21 1600  --  --  --  --  humidified;nasal cannula  2.5  --   02/07/21 1500  96.6 (35.9)  66  22  129/54  --  --  93   02/07/21 1400  --  --  --  --  humidified;nasal cannula  2.5  --   02/07/21 1200  --  --  --  --  humidified;nasal cannula  3  --   02/07/21 1100  96.9 (36.1)  66  20  116/53  humidified;nasal cannula  3  94   02/07/21 1000  --  --  --  --  humidified;nasal cannula  3  --   02/07/21 0800  --  --  --  --  humidified;nasal cannula  3  --   02/07/21 0700  96.9 (36.1)  --  20  105/43  --  --  --   02/07/21 0600  --  --  --  --  humidified;nasal cannula  --  --   02/07/21 0503  --  64  --  124/46  --  --  --   02/07/21 0400  --  --  --  --  humidified;nasal cannula  --  --   02/07/21 0301  97.7 (36.5)  60  18  115/59  --  --  94   02/07/21 0200  --  --  --  --  humidified;nasal cannula  --  --   02/07/21 0010  --  --  --  --  humidified;nasal cannula  --  --   02/07/21 0007  97.5 (36.4)   66  18  142/63  --  --  95

## 2021-02-10 ENCOUNTER — READMISSION MANAGEMENT (OUTPATIENT)
Dept: CALL CENTER | Facility: HOSPITAL | Age: 86
End: 2021-02-10

## 2021-02-10 NOTE — OUTREACH NOTE
Prep Survey      Responses   Orthodox facility patient discharged from?  [facility]   Is LACE score < 7 ?  No   Emergency Room discharge w/ pulse ox?  No   Eligibility  Readm Mgmt   Discharge diagnosis  Pneumonia due to COVID-19 virus   Does the patient have one of the following disease processes/diagnoses(primary or secondary)?  COVID-19   Does the patient have Home health ordered?  Yes   What is the Home health agency?   [facility]    Is there a DME ordered?  Yes   What DME was ordered?  [facility] - Oxygen    Prep survey completed?  Yes          Jo Huntley RN

## 2021-02-10 NOTE — OUTREACH NOTE
COVID-19 Week 1 Survey      Responses   [facility] patient discharged from?  [place]   Does the patient have one of the following disease processes/diagnoses(primary or secondary)?  COVID-19   COVID-19 underlying condition?  None   Call Number  Call 1   Week 1 Call successful?  Yes   Call start time  0900   Call end time  0909   General alerts for this patient  Daughter(Charito) staying with pt.   Discharge diagnosis  Pneumonia due to COVID-19 virus   Person spoke with today (if not patient) and relationship  Charito(Daughter)   Meds reviewed with patient/caregiver?  Yes   Is the patient having any side effects they believe may be caused by any medication additions or changes?  No   Does the patient have all medications ordered at discharge?  Yes   Is the patient taking all medications as directed (includes completed medication regime)?  Yes   Medication comments  on steroid   Comments regarding appointments  f/u appt. with PCP 2/18/21 @3pm   Does the patient have a primary care provider?   Yes   Does the patient have an appointment with their PCP or specialist within 7 days of discharge?  No   Nursing Interventions  Verified appointment date/time/provider   Has the patient kept scheduled appointments due by today?  N/A   What is the Home health agency?   [facility]  coming today or tomorrow   What DME was ordered?  [facility] - Oxygen 2-3L   Has all DME been delivered?  Yes   Psychosocial issues?  No   Did the patient receive a copy of their discharge instructions?  Yes   Did the patient receive a copy of COVID-19 specific instructions?  Yes   Nursing interventions  Reviewed instructions with patient   What is the patient's perception of their health status since discharge?  Improving   Does the patient have any of the following symptoms?  Cough, Shortness of breath   Nursing Interventions  Nurse provided patient education   Pulse Ox monitoring  Intermittent   Pulse Ox device source  Patient   O2 Sat comments  92%  on 3L   O2 Sat: education provided  Sat levels, When to seek care, Monitoring frequency   O2 Sat education comments  told daughter if pt. unable to maintain O2 level 92% or greater to return to ED.   Is the patient/caregiver able to teach back steps to recovery at home?  Eat a well-balance diet, Rest and rebuild strength, gradually increase activity   If the patient is a current smoker, are they able to teach back resources for cessation?  Not a smoker   Is the patient/caregiver able to teach back the hierarchy of who to call/visit for symptoms/problems? PCP, Specialist, Home health nurse, Urgent Care, ED, 911  Yes   COVID-19 call completed?  Yes   Wrap up additional comments  Daughter states pt. doing well but still feels weak.           Lorena Clemons, RN

## 2021-02-11 ENCOUNTER — READMISSION MANAGEMENT (OUTPATIENT)
Dept: CALL CENTER | Facility: HOSPITAL | Age: 86
End: 2021-02-11

## 2021-02-11 LAB
BACTERIA SPEC AEROBE CULT: NORMAL
BACTERIA SPEC AEROBE CULT: NORMAL

## 2021-02-11 NOTE — OUTREACH NOTE
COVID-19 Week 1 Survey      Responses   [facility] patient discharged from?  [place]   Does the patient have one of the following disease processes/diagnoses(primary or secondary)?  COVID-19   COVID-19 underlying condition?  None   Call Number  Call 2   Week 1 Call successful?  Yes   Call start time  0842   Call end time  0853   Is patient permission given to speak with other caregiver?  Yes   Person spoke with today (if not patient) and relationship  Mine-daughter   Meds reviewed with patient/caregiver?  Yes   Is the patient having any side effects they believe may be caused by any medication additions or changes?  No   Does the patient have all medications ordered at discharge?  Yes   Is the patient taking all medications as directed (includes completed medication regime)?  Yes   Does the patient have a primary care provider?   Yes   Has the patient kept scheduled appointments due by today?  N/A   Has home health visited the patient within 72 hours of discharge?  Yes   What DME was ordered?  Wearing home O2 at 2-3L continuous.   Has all DME been delivered?  Yes   Psychosocial issues?  No   What is the patient's perception of their health status since discharge?  Improving   Does the patient have any of the following symptoms?  Cough, Shortness of breath   Nursing Interventions  Nurse provided patient education   Pulse Ox monitoring  Intermittent   Pulse Ox device source  Patient   O2 Sat comments  94-95% on 3L continuous home O2.   Is the patient/caregiver able to teach back steps to recovery at home?  Set small, achievable goals for return to baseline health   If the patient is a current smoker, are they able to teach back resources for cessation?  Not a smoker   Is the patient/caregiver able to teach back the hierarchy of who to call/visit for symptoms/problems? PCP, Specialist, Home health nurse, Urgent Care, ED, 911  Yes   COVID-19 call completed?  Yes   Wrap up additional comments  Daughter states is  "slightly improved but still weak. States appetite good. States has productive cough with brownish black phlegm-states will report to  nurse. Denies any fever or chest pain. States slight SOA on exertion. Reports /82 which is \"good for her\". Denies any needs today.          Isha Mesa RN  "

## 2021-02-12 ENCOUNTER — READMISSION MANAGEMENT (OUTPATIENT)
Dept: CALL CENTER | Facility: HOSPITAL | Age: 86
End: 2021-02-12

## 2021-02-12 NOTE — OUTREACH NOTE
COVID-19 Week 1 Survey      Responses   [facility] patient discharged from?  [place]   Does the patient have one of the following disease processes/diagnoses(primary or secondary)?  COVID-19   COVID-19 underlying condition?  None   Call Number  Call 3   Week 1 Call successful?  Yes   Call start time  0847   Call end time  0904   General alerts for this patient  Daughter(Charito) staying with pt.   Discharge diagnosis  Pneumonia due to COVID-19 virus   Is patient permission given to speak with other caregiver?  Yes   Person spoke with today (if not patient) and relationship  Charito-daughter   Meds reviewed with patient/caregiver?  Yes   Is the patient having any side effects they believe may be caused by any medication additions or changes?  No   Does the patient have all medications ordered at discharge?  Yes   Is the patient taking all medications as directed (includes completed medication regime)?  Yes   Does the patient have a primary care provider?   Yes   Comments regarding PCP  PCP Dr Myers, Appt patrice 2/18/21   Does the patient have an appointment with their PCP or specialist within 7 days of discharge?  Greater than 7 days   Nursing Interventions  Verified appointment date/time/provider   Has the patient kept scheduled appointments due by today?  N/A   What is the Home health agency?   [facility]    Has home health visited the patient within 72 hours of discharge?  Yes   What DME was ordered?  Wearing home O2 at 2-3L continuous.   Has all DME been delivered?  Yes   Psychosocial issues?  No   Did the patient receive a copy of their discharge instructions?  Yes   Did the patient receive a copy of COVID-19 specific instructions?  Yes   Nursing interventions  Reviewed instructions with patient   What is the patient's perception of their health status since discharge?  Improving   Does the patient have any of the following symptoms?  Cough, Shortness of breath   Nursing Interventions  Nurse provided patient  education   Pulse Ox monitoring  Intermittent   Pulse Ox device source  Patient   O2 Sat comments  O2 sat 92-94%  O2 3L per [place] continuous O2.   O2 Sat: education provided  Sat levels, Monitoring frequency, When to seek care   O2 Sat education comments  Advised to seek care if unable to maintain O2 sat 92% or above with wearing O2, deep breathing,and resting.    Is the patient/caregiver able to teach back steps to recovery at home?  Set small, achievable goals for return to baseline health, Rest and rebuild strength, gradually increase activity   If the patient is a current smoker, are they able to teach back resources for cessation?  Not a smoker   Is the patient/caregiver able to teach back the hierarchy of who to call/visit for symptoms/problems? PCP, Specialist, Home health nurse, Urgent Care, ED, 911  Yes   COVID-19 call completed?  Yes   Wrap up additional comments  Daughter reports that she encouraged patient to do the deep breath exercises. She states that she does tire out easily with the walks to the bathroom.           Norma Sotomayor RN

## 2021-02-15 ENCOUNTER — READMISSION MANAGEMENT (OUTPATIENT)
Dept: CALL CENTER | Facility: HOSPITAL | Age: 86
End: 2021-02-15

## 2021-02-15 NOTE — OUTREACH NOTE
COVID-19 Week 1 Survey      Responses   [facility] patient discharged from?  [place]   Does the patient have one of the following disease processes/diagnoses(primary or secondary)?  COVID-19   COVID-19 underlying condition?  None   Call Number  Call 4   Week 1 Call successful?  Yes   Call start time  0903   Call end time  0915   Is patient permission given to speak with other caregiver?  Yes   Person spoke with today (if not patient) and relationship  Charito-daughter   Meds reviewed with patient/caregiver?  Yes   Is the patient having any side effects they believe may be caused by any medication additions or changes?  No   Does the patient have all medications ordered at discharge?  Yes   Is the patient taking all medications as directed (includes completed medication regime)?  Yes   Does the patient have a primary care provider?   Yes   Has the patient kept scheduled appointments due by today?  N/A   Has home health visited the patient within 72 hours of discharge?  Yes   Home health comments  HH continues to visit.   What DME was ordered?  Continues to wear home O2 at 2-3L continuous.   Psychosocial issues?  No   Did the patient receive a copy of their discharge instructions?  Yes   Did the patient receive a copy of COVID-19 specific instructions?  Yes   What is the patient's perception of their health status since discharge?  Improving   Does the patient have any of the following symptoms?  Cough, Shortness of breath   Pulse Ox monitoring  Intermittent   Pulse Ox device source  Patient   O2 Sat comments  O2 sat 92-95% on 2-3L continuous home O2.   Is the patient/caregiver able to teach back the hierarchy of who to call/visit for symptoms/problems? PCP, Specialist, Home health nurse, Urgent Care, ED, 911  Yes   COVID-19 call completed?  Yes   Wrap up additional comments  Daughter states is slowly improving-continues with occasional cough, SOA on exertion. Reports /68. States using barrier cream due to  soft large stools-denies any s/s of GI bleeding. Daughter states patient having some difficulty caring for herself, and plans to discuss with PCP. Denies any needs today.          Isha Mesa RN

## 2021-02-18 ENCOUNTER — READMISSION MANAGEMENT (OUTPATIENT)
Dept: CALL CENTER | Facility: HOSPITAL | Age: 86
End: 2021-02-18

## 2021-02-18 NOTE — OUTREACH NOTE
COVID-19 Week 1 Survey      Responses   [facility] [place] patient discharged from?  [place]   Does the patient have one of the following disease processes/diagnoses(primary or secondary)?  COVID-19   COVID-19 underlying condition?  None   Call start time  0939   Call end time  0942   General alerts for this patient  Daughter(Charito) staying with pt.   Discharge diagnosis  Pneumonia due to COVID-19 virus   Is patient permission given to speak with other caregiver?  Yes   Person spoke with today (if not patient) and relationship  Charito-daughter   Meds reviewed with patient/caregiver?  Yes   Is the patient having any side effects they believe may be caused by any medication additions or changes?  No   Does the patient have all medications ordered at discharge?  Yes   Is the patient taking all medications as directed (includes completed medication regime)?  Yes   Comments regarding appointments  f/u appt. with PCP 2/18/21 @3pm   Does the patient have a primary care provider?   Yes   Comments regarding PCP  PCP Dr Myers, Appt Thurs 2/18/21   Does the patient have an appointment with their PCP or specialist within 7 days of discharge?  Greater than 7 days   Nursing Interventions  Verified appointment date/time/provider   Has the patient kept scheduled appointments due by today?  N/A   Did the patient receive a copy of their discharge instructions?  Yes   Did the patient receive a copy of COVID-19 specific instructions?  Yes   Nursing interventions  Reviewed instructions with patient   What is the patient's perception of their health status since discharge?  Improving [Advised to change toothbrush and toothpaste, She reoprts she has boiled it, need to check Temp BID, and sanatize. ]   Does the patient have any of the following symptoms?  Cough   Nursing Interventions  Nurse provided patient education   Pulse Ox monitoring  Intermittent   Pulse Ox device source  Patient   O2 Sat comments  O2 sat 92-95% on 2-3L continuous  home O2.   O2 Sat: education provided  Sat levels, Monitoring frequency, When to seek care, Other   O2 Sat education comments  Advised to seek care if unable to maintain O2 sat 92% or above with wearing O2, deep breathing,and resting.    Is the patient/caregiver able to teach back steps to recovery at home?  Set small, achievable goals for return to baseline health, Rest and rebuild strength, gradually increase activity   If the patient is a current smoker, are they able to teach back resources for cessation?  Not a smoker   Is the patient/caregiver able to teach back the hierarchy of who to call/visit for symptoms/problems? PCP, Specialist, Home health nurse, Urgent Care, ED, 911  Yes   Wrap up additional comments  She is doing better, she reports HH is coming to the house.           Leda Funez RN

## 2021-02-21 ENCOUNTER — READMISSION MANAGEMENT (OUTPATIENT)
Dept: CALL CENTER | Facility: HOSPITAL | Age: 86
End: 2021-02-21

## 2021-02-21 NOTE — OUTREACH NOTE
COVID-19 Week 2 Survey      Responses   [facility] patient discharged from?  [place]   Does the patient have one of the following disease processes/diagnoses(primary or secondary)?  COVID-19   COVID-19 underlying condition?  None   Call Number  Call 2   COVID-19 Week 2: Call 1 attempt successful?  Yes   Call start time  0937   Call end time  0943   General alerts for this patient  Daughter(Charito) staying with pt.   Is patient permission given to speak with other caregiver?  Yes   Person spoke with today (if not patient) and relationship  Charito-daughter   Meds reviewed with patient/caregiver?  Yes   Is the patient having any side effects they believe may be caused by any medication additions or changes?  No   Does the patient have all medications ordered at discharge?  Yes   Is the patient taking all medications as directed (includes completed medication regime)?  Yes   Does the patient have a primary care provider?   Yes   Comments regarding PCP  PCP Dr Myers, Appt Thurs 2/18/21   Has the patient kept scheduled appointments due by today?  N/A   What [facility] the [facility] health agency[facility]    Has home health visited the patient within 72 hours of discharge?  Yes   What DME was ordered?  Continues to wear home O2 at 2-3L continuous.   Has all DME been delivered?  Yes   Psychosocial issues?  No   Did the patient receive a copy of their discharge instructions?  Yes   Did the patient receive a copy of COVID-19 specific instructions?  Yes   Nursing interventions  Reviewed instructions with patient   What is the patient's perception of their health status since discharge?  Improving   Does the patient have any of the following symptoms?  Shortness of breath [asthma]   Nursing Interventions  Nurse provided patient education   Pulse Ox monitoring  Intermittent   Pulse Ox device source  Patient   O2 Sat comments  O2 93-95% on  4L   O2 Sat: education provided  Sat levels, Monitoring frequency, When to seek care   O2  Sat education comments  If 92% or below and stays there, call 911.   Is the patient/caregiver able to teach back steps to recovery at home?  Set small, achievable goals for return to baseline health, Rest and rebuild strength, gradually increase activity, Eat a well-balance diet, Make a list of questions for provider's appointment   If the patient is a current smoker, are they able to teach back resources for cessation?  Not a smoker   Is the patient/caregiver able to teach back the hierarchy of who to call/visit for symptoms/problems? PCP, Specialist, Home health nurse, Urgent Care, ED, 911  Yes   COVID-19 call completed?  Yes   Wrap up additional comments  She is improving daily, BS has been satisfactory she says.          Maria Eugenia Cardoza RN

## 2021-02-28 ENCOUNTER — READMISSION MANAGEMENT (OUTPATIENT)
Dept: CALL CENTER | Facility: HOSPITAL | Age: 86
End: 2021-02-28

## 2021-02-28 NOTE — OUTREACH NOTE
COVID-19 Week 3 Survey      Responses   [facility] patient discharged from?  [place]   Does the patient have one of the following disease processes/diagnoses(primary or secondary)?  COVID-19   COVID-19 underlying condition?  None   Call Number  Call 1   COVID-19 Week 3: Call 1 attempt successful?  No   Discharge diagnosis  Pneumonia due to COVID-19 virus          Maria Eugenia Cardoza RN

## 2021-03-07 ENCOUNTER — READMISSION MANAGEMENT (OUTPATIENT)
Dept: CALL CENTER | Facility: HOSPITAL | Age: 86
End: 2021-03-07

## 2021-03-07 NOTE — OUTREACH NOTE
COVID-19 Week 4 Survey      Responses   [facility] patient discharged from?  [place]   Does the patient have one of the following disease processes/diagnoses(primary or secondary)?  COVID-19   COVID-19 underlying condition?  None   Call Number  Call 1   COVID-19 Week 4: Call 1 attempt successful?  No   Discharge diagnosis  Pneumonia due to COVID-19 virus          Alexia Lucas, RN

## 2021-03-11 RX ORDER — ESTROGENS, CONJUGATED 0.45 MG/1
TABLET, FILM COATED ORAL
Qty: 30 TABLET | Refills: 0 | Status: SHIPPED | OUTPATIENT
Start: 2021-03-11